# Patient Record
Sex: FEMALE | Race: WHITE | NOT HISPANIC OR LATINO | Employment: OTHER | URBAN - METROPOLITAN AREA
[De-identification: names, ages, dates, MRNs, and addresses within clinical notes are randomized per-mention and may not be internally consistent; named-entity substitution may affect disease eponyms.]

---

## 2021-11-18 ENCOUNTER — APPOINTMENT (OUTPATIENT)
Dept: CT IMAGING | Facility: HOSPITAL | Age: 76
End: 2021-11-18

## 2021-11-18 ENCOUNTER — HOSPITAL ENCOUNTER (OUTPATIENT)
Facility: HOSPITAL | Age: 76
Setting detail: OBSERVATION
Discharge: HOME | End: 2021-11-19
Attending: EMERGENCY MEDICINE | Admitting: NURSE PRACTITIONER

## 2021-11-18 DIAGNOSIS — N39.0 ACUTE UTI: ICD-10-CM

## 2021-11-18 DIAGNOSIS — Z78.9 FAILURE OF OUTPATIENT TREATMENT: ICD-10-CM

## 2021-11-18 DIAGNOSIS — D64.9 ANEMIA, UNSPECIFIED TYPE: ICD-10-CM

## 2021-11-18 DIAGNOSIS — N28.89 RENAL MASS: Primary | ICD-10-CM

## 2021-11-18 PROBLEM — N13.4 HYDROURETER, LEFT: Status: ACTIVE | Noted: 2021-11-18

## 2021-11-18 PROBLEM — I87.1 NUTCRACKER PHENOMENON OF RENAL VEIN: Status: ACTIVE | Noted: 2021-11-18

## 2021-11-18 PROBLEM — L40.9 PSORIASIS: Status: ACTIVE | Noted: 2021-11-18

## 2021-11-18 PROBLEM — N13.30 HYDRONEPHROSIS, LEFT: Status: ACTIVE | Noted: 2021-11-18

## 2021-11-18 PROBLEM — R19.00 RETROPERITONEAL MASS: Status: ACTIVE | Noted: 2021-11-18

## 2021-11-18 PROBLEM — E63.9 INADEQUATE ORAL NUTRITIONAL INTAKE: Status: ACTIVE | Noted: 2021-11-18

## 2021-11-18 PROBLEM — E86.0 DEHYDRATION: Status: ACTIVE | Noted: 2021-11-18

## 2021-11-18 PROBLEM — R61 UNEXPLAINED NIGHT SWEATS: Status: ACTIVE | Noted: 2021-11-18

## 2021-11-18 PROBLEM — E88.09 SERUM ALBUMIN DECREASED: Status: ACTIVE | Noted: 2021-11-18

## 2021-11-18 LAB
ALBUMIN SERPL-MCNC: 3.2 G/DL (ref 3.5–5.2)
ALBUMIN/GLOB SERPL: 1 G/DL
ALP SERPL-CCNC: 76 U/L (ref 39–117)
ALT SERPL W P-5'-P-CCNC: 8 U/L (ref 1–33)
ANION GAP SERPL CALCULATED.3IONS-SCNC: 11 MMOL/L (ref 5–15)
AST SERPL-CCNC: 28 U/L (ref 1–32)
B PARAPERT DNA SPEC QL NAA+PROBE: NOT DETECTED
B PERT DNA SPEC QL NAA+PROBE: NOT DETECTED
BACTERIA UR QL AUTO: ABNORMAL /HPF
BASOPHILS # BLD AUTO: 0.02 10*3/MM3 (ref 0–0.2)
BASOPHILS NFR BLD AUTO: 0.5 % (ref 0–1.5)
BILIRUB SERPL-MCNC: 0.8 MG/DL (ref 0–1.2)
BILIRUB UR QL STRIP: NEGATIVE
BUN SERPL-MCNC: 14 MG/DL (ref 8–23)
BUN/CREAT SERPL: 17.1 (ref 7–25)
C PNEUM DNA NPH QL NAA+NON-PROBE: NOT DETECTED
CALCIUM SPEC-SCNC: 8.9 MG/DL (ref 8.6–10.5)
CHLORIDE SERPL-SCNC: 100 MMOL/L (ref 98–107)
CLARITY UR: ABNORMAL
CO2 SERPL-SCNC: 26 MMOL/L (ref 22–29)
COARSE GRAN CASTS URNS QL MICRO: ABNORMAL /LPF
COLOR UR: YELLOW
CREAT SERPL-MCNC: 0.82 MG/DL (ref 0.57–1)
D-LACTATE SERPL-SCNC: 1.3 MMOL/L (ref 0.5–2)
DEPRECATED RDW RBC AUTO: 50.6 FL (ref 37–54)
EOSINOPHIL # BLD AUTO: 0.02 10*3/MM3 (ref 0–0.4)
EOSINOPHIL NFR BLD AUTO: 0.5 % (ref 0.3–6.2)
ERYTHROCYTE [DISTWIDTH] IN BLOOD BY AUTOMATED COUNT: 14.7 % (ref 12.3–15.4)
FLUAV SUBTYP SPEC NAA+PROBE: NOT DETECTED
FLUBV RNA ISLT QL NAA+PROBE: NOT DETECTED
GFR SERPL CREATININE-BSD FRML MDRD: 67 ML/MIN/1.73
GLOBULIN UR ELPH-MCNC: 3.1 GM/DL
GLUCOSE SERPL-MCNC: 105 MG/DL (ref 65–99)
GLUCOSE UR STRIP-MCNC: NEGATIVE MG/DL
HADV DNA SPEC NAA+PROBE: NOT DETECTED
HCOV 229E RNA SPEC QL NAA+PROBE: NOT DETECTED
HCOV HKU1 RNA SPEC QL NAA+PROBE: NOT DETECTED
HCOV NL63 RNA SPEC QL NAA+PROBE: NOT DETECTED
HCOV OC43 RNA SPEC QL NAA+PROBE: NOT DETECTED
HCT VFR BLD AUTO: 29.7 % (ref 34–46.6)
HGB BLD-MCNC: 9.5 G/DL (ref 12–15.9)
HGB UR QL STRIP.AUTO: ABNORMAL
HMPV RNA NPH QL NAA+NON-PROBE: NOT DETECTED
HPIV1 RNA ISLT QL NAA+PROBE: NOT DETECTED
HPIV2 RNA SPEC QL NAA+PROBE: NOT DETECTED
HPIV3 RNA NPH QL NAA+PROBE: NOT DETECTED
HPIV4 P GENE NPH QL NAA+PROBE: NOT DETECTED
HYALINE CASTS UR QL AUTO: ABNORMAL /LPF
IMM GRANULOCYTES # BLD AUTO: 0.01 10*3/MM3 (ref 0–0.05)
IMM GRANULOCYTES NFR BLD AUTO: 0.2 % (ref 0–0.5)
KETONES UR QL STRIP: ABNORMAL
LEUKOCYTE ESTERASE UR QL STRIP.AUTO: NEGATIVE
LIPASE SERPL-CCNC: 26 U/L (ref 13–60)
LYMPHOCYTES # BLD AUTO: 0.65 10*3/MM3 (ref 0.7–3.1)
LYMPHOCYTES NFR BLD AUTO: 15.5 % (ref 19.6–45.3)
M PNEUMO IGG SER IA-ACNC: NOT DETECTED
MCH RBC QN AUTO: 29.7 PG (ref 26.6–33)
MCHC RBC AUTO-ENTMCNC: 32 G/DL (ref 31.5–35.7)
MCV RBC AUTO: 92.8 FL (ref 79–97)
MONOCYTES # BLD AUTO: 0.53 10*3/MM3 (ref 0.1–0.9)
MONOCYTES NFR BLD AUTO: 12.6 % (ref 5–12)
MUCOUS THREADS URNS QL MICRO: ABNORMAL /HPF
NEUTROPHILS NFR BLD AUTO: 2.96 10*3/MM3 (ref 1.7–7)
NEUTROPHILS NFR BLD AUTO: 70.7 % (ref 42.7–76)
NITRITE UR QL STRIP: NEGATIVE
NRBC BLD AUTO-RTO: 0 /100 WBC (ref 0–0.2)
NT-PROBNP SERPL-MCNC: 573.5 PG/ML (ref 0–1800)
PH UR STRIP.AUTO: 5.5 [PH] (ref 5–8)
PHOSPHATE SERPL-MCNC: 2.9 MG/DL (ref 2.5–4.5)
PLAT MORPH BLD: NORMAL
PLATELET # BLD AUTO: 202 10*3/MM3 (ref 140–450)
PMV BLD AUTO: 11.5 FL (ref 6–12)
POTASSIUM SERPL-SCNC: 3.8 MMOL/L (ref 3.5–5.2)
PROT SERPL-MCNC: 6.3 G/DL (ref 6–8.5)
PROT UR QL STRIP: ABNORMAL
RBC # BLD AUTO: 3.2 10*6/MM3 (ref 3.77–5.28)
RBC # UR STRIP: ABNORMAL /HPF
RBC MORPH BLD: NORMAL
REF LAB TEST METHOD: ABNORMAL
RHINOVIRUS RNA SPEC NAA+PROBE: NOT DETECTED
RSV RNA NPH QL NAA+NON-PROBE: NOT DETECTED
SARS-COV-2 RNA NPH QL NAA+NON-PROBE: NOT DETECTED
SODIUM SERPL-SCNC: 137 MMOL/L (ref 136–145)
SP GR UR STRIP: 1.02 (ref 1–1.03)
SQUAMOUS #/AREA URNS HPF: ABNORMAL /HPF
TROPONIN T SERPL-MCNC: <0.01 NG/ML (ref 0–0.03)
UROBILINOGEN UR QL STRIP: ABNORMAL
WBC # UR STRIP: ABNORMAL /HPF
WBC MORPH BLD: NORMAL
WBC NRBC COR # BLD: 4.19 10*3/MM3 (ref 3.4–10.8)

## 2021-11-18 PROCEDURE — G0378 HOSPITAL OBSERVATION PER HR: HCPCS

## 2021-11-18 PROCEDURE — 87086 URINE CULTURE/COLONY COUNT: CPT | Performed by: NURSE PRACTITIONER

## 2021-11-18 PROCEDURE — 96361 HYDRATE IV INFUSION ADD-ON: CPT

## 2021-11-18 PROCEDURE — 96365 THER/PROPH/DIAG IV INF INIT: CPT

## 2021-11-18 PROCEDURE — 83690 ASSAY OF LIPASE: CPT | Performed by: NURSE PRACTITIONER

## 2021-11-18 PROCEDURE — 80053 COMPREHEN METABOLIC PANEL: CPT | Performed by: NURSE PRACTITIONER

## 2021-11-18 PROCEDURE — 99220 PR INITIAL OBSERVATION CARE/DAY 70 MINUTES: CPT | Performed by: HOSPITALIST

## 2021-11-18 PROCEDURE — 84484 ASSAY OF TROPONIN QUANT: CPT | Performed by: NURSE PRACTITIONER

## 2021-11-18 PROCEDURE — 85007 BL SMEAR W/DIFF WBC COUNT: CPT | Performed by: NURSE PRACTITIONER

## 2021-11-18 PROCEDURE — 74176 CT ABD & PELVIS W/O CONTRAST: CPT

## 2021-11-18 PROCEDURE — 93005 ELECTROCARDIOGRAM TRACING: CPT | Performed by: NURSE PRACTITIONER

## 2021-11-18 PROCEDURE — 81001 URINALYSIS AUTO W/SCOPE: CPT | Performed by: NURSE PRACTITIONER

## 2021-11-18 PROCEDURE — 25010000002 CEFTRIAXONE PER 250 MG: Performed by: NURSE PRACTITIONER

## 2021-11-18 PROCEDURE — 99284 EMERGENCY DEPT VISIT MOD MDM: CPT

## 2021-11-18 PROCEDURE — 74177 CT ABD & PELVIS W/CONTRAST: CPT

## 2021-11-18 PROCEDURE — 83880 ASSAY OF NATRIURETIC PEPTIDE: CPT | Performed by: NURSE PRACTITIONER

## 2021-11-18 PROCEDURE — 0202U NFCT DS 22 TRGT SARS-COV-2: CPT | Performed by: NURSE PRACTITIONER

## 2021-11-18 PROCEDURE — 70450 CT HEAD/BRAIN W/O DYE: CPT

## 2021-11-18 PROCEDURE — 83605 ASSAY OF LACTIC ACID: CPT | Performed by: NURSE PRACTITIONER

## 2021-11-18 PROCEDURE — 71250 CT THORAX DX C-: CPT

## 2021-11-18 PROCEDURE — 85025 COMPLETE CBC W/AUTO DIFF WBC: CPT | Performed by: NURSE PRACTITIONER

## 2021-11-18 PROCEDURE — 84100 ASSAY OF PHOSPHORUS: CPT | Performed by: HOSPITALIST

## 2021-11-18 PROCEDURE — 25010000002 IOPAMIDOL 61 % SOLUTION: Performed by: HOSPITALIST

## 2021-11-18 RX ORDER — SODIUM CHLORIDE 0.9 % (FLUSH) 0.9 %
10 SYRINGE (ML) INJECTION AS NEEDED
Status: DISCONTINUED | OUTPATIENT
Start: 2021-11-18 | End: 2021-11-19 | Stop reason: HOSPADM

## 2021-11-18 RX ORDER — SODIUM CHLORIDE 0.9 % (FLUSH) 0.9 %
10 SYRINGE (ML) INJECTION EVERY 12 HOURS SCHEDULED
Status: DISCONTINUED | OUTPATIENT
Start: 2021-11-18 | End: 2021-11-19 | Stop reason: HOSPADM

## 2021-11-18 RX ORDER — METOPROLOL SUCCINATE 25 MG/1
1 TABLET, EXTENDED RELEASE ORAL DAILY
COMMUNITY
Start: 2021-07-26

## 2021-11-18 RX ORDER — ASPIRIN 81 MG/1
TABLET, CHEWABLE ORAL
COMMUNITY

## 2021-11-18 RX ORDER — MIRTAZAPINE 15 MG/1
15 TABLET, FILM COATED ORAL NIGHTLY
Status: DISCONTINUED | OUTPATIENT
Start: 2021-11-18 | End: 2021-11-19 | Stop reason: HOSPADM

## 2021-11-18 RX ORDER — ASPIRIN 81 MG/1
81 TABLET ORAL DAILY
Status: DISCONTINUED | OUTPATIENT
Start: 2021-11-19 | End: 2021-11-19 | Stop reason: HOSPADM

## 2021-11-18 RX ORDER — ROSUVASTATIN CALCIUM 20 MG/1
20 TABLET, COATED ORAL
COMMUNITY
Start: 2021-07-28

## 2021-11-18 RX ORDER — HEPARIN SODIUM 5000 [USP'U]/ML
5000 INJECTION, SOLUTION INTRAVENOUS; SUBCUTANEOUS EVERY 8 HOURS SCHEDULED
Status: DISCONTINUED | OUTPATIENT
Start: 2021-11-19 | End: 2021-11-19 | Stop reason: HOSPADM

## 2021-11-18 RX ORDER — DEXTROSE AND SODIUM CHLORIDE 5; .9 G/100ML; G/100ML
125 INJECTION, SOLUTION INTRAVENOUS CONTINUOUS
Status: ACTIVE | OUTPATIENT
Start: 2021-11-18 | End: 2021-11-19

## 2021-11-18 RX ORDER — ROSUVASTATIN CALCIUM 20 MG/1
20 TABLET, COATED ORAL NIGHTLY
Status: DISCONTINUED | OUTPATIENT
Start: 2021-11-19 | End: 2021-11-19 | Stop reason: HOSPADM

## 2021-11-18 RX ORDER — ACETAMINOPHEN 500 MG
500 TABLET ORAL EVERY 6 HOURS PRN
Status: DISCONTINUED | OUTPATIENT
Start: 2021-11-18 | End: 2021-11-19 | Stop reason: HOSPADM

## 2021-11-18 RX ADMIN — MIRTAZAPINE 15 MG: 15 TABLET, FILM COATED ORAL at 20:59

## 2021-11-18 RX ADMIN — SODIUM CHLORIDE, PRESERVATIVE FREE 10 ML: 5 INJECTION INTRAVENOUS at 21:00

## 2021-11-18 RX ADMIN — IOPAMIDOL 85 ML: 612 INJECTION, SOLUTION INTRAVENOUS at 14:20

## 2021-11-18 RX ADMIN — DEXTROSE AND SODIUM CHLORIDE 125 ML/HR: 5; 900 INJECTION, SOLUTION INTRAVENOUS at 20:59

## 2021-11-18 RX ADMIN — SODIUM CHLORIDE 2 G: 900 INJECTION INTRAVENOUS at 12:58

## 2021-11-18 NOTE — H&P
[facility] Medicine Services  HISTORY AND PHYSICAL    Patient Name: Vale Cee  : 1945  MRN: [id number]  Primary Care Physician: Sarah Redman APRN  Date of admission: 2021      Subjective   Subjective     Chief Complaint:   Failure to Thrive    HPI:  Vale Cee is a 76 y.o. female with history of skin cancer, hypertension, kidney stones, hyperlipidemia, vertebral fracture, tobacco use disorder who has been failing to thrive for 3 months.  She reports going to her PCP now every couple of weeks and medication adjustments are made but she continues to get weaker and has no appetite and is progressively loosing weight.  She reports only eating roughly 200 calories per day and has had low blood pressure and having to be taken off a fluid pill recently due to low blood pressure.    She was put on potassium, treated for a UTI for 7 days, and most recently treated with Vitamin D but reports none of these treatments have improved her condition.  She is frustrated with her decline and presented to the ER due to getting worse.    On evaluation in the ER she was hypotensive on Physiologic Monitor in ER.  She recently has been hypotensive in the office and had to be taken off her baseline blood pressure mediation.  She is dehydrated on assessment in the ER.    She has had nausea but no vomiting and just can't eat.  She has been having hot flashes at night where she reports her hair gets wet but it is unclear if she has had a fever at home.    She was recently diagnosed with new anemia in the office but after outpatient work up if was felt she needed to see a hematologist.   She has yet to see one.    She reports no appetite, losing weight, night sweats, new anemia, and nausea.   She reports being vaccinated against COVID-19 and denies any recent exposures.  Her son is in the ER and provides some history.  She has been drinking Power [employer] since she has been dehydrated.      COVID  Details:    Symptoms:    [x] NONE [] Fever []  Cough [] Shortness of breath [] Change in taste/smell      The patient qualifies to receive the vaccine, but they have not yet received it.      Review of Systems     Gen- No fevers, chills  CV- No chest pain, palpitations  Resp- No cough, dyspnea  GI-  + nausea , abd pain      All other systems reviewed and are negative.     Personal History     Past Medical History  - tobacco use  - hypertension  - kidney stones  - appendicitis  - lumbar spine compression fracture   - Psoriasis  - skin cancer  - hyperlipidemia    Past Surgical History  - Appendectomy  - kidney stone work  - Gallbladder surgery  - Low Back surgery with cement (suspected Kyphoplasty)    Family History:   Father - emphysema (smoker)  Mother - pancreatic cancer  Otherwise pertinent FHx was reviewed and unremarkable.     Social History:  reports that she has quit smoking. She does not have any smokeless tobacco history on file. She reports that she does not drink alcohol and does not use drugs.  Social History     Social History Narrative   • Not on file     3 kids  GED level of education  Worked at [employer] in the past  Smoker of cigarettes - starting at 15 yrs old (smoker for over 60 yrs)  Denies alcohol abuse  Denies drug abuse    Medications:  Available home medication information reviewed.  (Not in a hospital admission)      Allergies   Allergen Reactions   • Codeine Nausea And Vomiting   • Penicillins Swelling       Objective   Objective     Vital Signs:   Temp:  [98 °F (36.7 °C)] 98 °F (36.7 °C)  Heart Rate:  [67-78] 67  Resp:  [16-17] 16  BP: (106-124)/(51-67) 110/58       Physical Exam     Constitutional: Awake, alert  Eyes: PERRLA, sclerae anicteric, no conjunctival injection  HENT: NCAT, dry tongue, wasted muscles of the face, neck, temples  Neck: Supple, no JVD, trachea midline  Respiratory: Clear to auscultation bilaterally, nonlabored respirations   Cardiovascular: RRR, no murmurs,  rubs, or gallops, palpable pedal pulses bilaterally  Gastrointestinal: Positive bowel sounds, soft, nontender, nondistended  Musculoskeletal: No edema  Psychiatric: Appropriate affect, cooperative  Neurologic: Oriented x 3, strength symmetric - generalized weakness, speech clear  Skin: dry, + skin tenting    Result Review:  I have personally reviewed the results from the time of this admission to 11/18/2021 17:32 EST and agree with these findings:  [x]  Laboratory  []  Microbiology  []  Radiology  []  EKG/Telemetry   []  Cardiology/Vascular   []  Pathology  []  Old records  []  Other:  Most notable findings include:   Wasted temporalis muscles, wasted appearance, dehydration, low blood pressure in the ER      LAB RESULTS:      Lab 11/18/21  1258 11/18/21  0929   WBC  --  4.19   HEMOGLOBIN  --  9.5*   HEMATOCRIT  --  29.7*   PLATELETS  --  202   NEUTROS ABS  --  2.96   IMMATURE GRANS (ABS)  --  0.01   LYMPHS ABS  --  0.65*   MONOS ABS  --  0.53   EOS ABS  --  0.02   MCV  --  92.8   LACTATE 1.3  --          Lab 11/18/21  0929   SODIUM 137   POTASSIUM 3.8   CHLORIDE 100   CO2 26.0   ANION GAP 11.0   BUN 14   CREATININE 0.82   GLUCOSE 105*   CALCIUM 8.9         Lab 11/18/21  0929   TOTAL PROTEIN 6.3   ALBUMIN 3.20*   GLOBULIN 3.1   ALT (SGPT) 8   AST (SGOT) 28   BILIRUBIN 0.8   ALK PHOS 76   LIPASE 26         Lab 11/18/21  0958 11/18/21  0929   PROBNP  --  573.5   TROPONIN T <0.010  --                  UA    Urinalysis 11/18/21 11/18/21    0924 0924   Squamous Epithelial Cells, UA  13-20 (A)   Specific Gravity, UA 1.018    Ketones, UA Trace (A)    Blood, UA Small (1+) (A)    Leukocytes, UA Negative    Nitrite, UA Negative    RBC, UA  3-6 (A)   WBC, UA  6-12 (A)   Bacteria, UA  3+ (A)   (A) Abnormal value              Microbiology Results (last 10 days)     Procedure Component Value - Date/Time    COVID PRE-OP / PRE-PROCEDURE SCREENING ORDER (NO ISOLATION) - Swab, Nasopharynx [[id number]]  (Normal) Collected: 11/18/21  0924    Lab Status: Final result Specimen: Swab from Nasopharynx Updated: 11/18/21 1044    Narrative:      The following orders were created for panel order COVID PRE-OP / PRE-PROCEDURE SCREENING ORDER (NO ISOLATION) - Swab, Nasopharynx.  Procedure                               Abnormality         Status                     ---------                               -----------         ------                     Respiratory Panel PCR w/...[[id number]]  Normal              Final result                 Please view results for these tests on the individual orders.    Respiratory Panel PCR w/COVID-19(SARS-CoV-2) DANIELLE/SHELL/MARTA/PAD/COR/MAD/KIM In-House, NP Swab in UTM/VTM, 3-4 HR TAT - Swab, Nasopharynx [[id number]]  (Normal) Collected: 11/18/21 0924    Lab Status: Final result Specimen: Swab from Nasopharynx Updated: 11/18/21 1044     ADENOVIRUS, PCR Not Detected     Coronavirus 229E Not Detected     Coronavirus HKU1 Not Detected     Coronavirus NL63 Not Detected     Coronavirus OC43 Not Detected     COVID19 Not Detected     Human Metapneumovirus Not Detected     Human Rhinovirus/Enterovirus Not Detected     Influenza A PCR Not Detected     Influenza B PCR Not Detected     Parainfluenza Virus 1 Not Detected     Parainfluenza Virus 2 Not Detected     Parainfluenza Virus 3 Not Detected     Parainfluenza Virus 4 Not Detected     RSV, PCR Not Detected     Bordetella pertussis pcr Not Detected     Bordetella parapertussis PCR Not Detected     Chlamydophila pneumoniae PCR Not Detected     Mycoplasma pneumo by PCR Not Detected    Narrative:      In the setting of a positive respiratory panel with a viral infection PLUS a negative procalcitonin without other underlying concern for bacterial infection, consider observing off antibiotics or discontinuation of antibiotics and continue supportive care. If the respiratory panel is positive for atypical bacterial infection (Bordetella pertussis, Chlamydophila pneumoniae, or Mycoplasma  pneumoniae), consider antibiotic de-escalation to target atypical bacterial infection.          CT Abdomen Pelvis Without Contrast    Result Date: 11/18/2021  EXAMINATION: CT CHEST WO CONTRAST DIAGNOSTIC-, CT ABDOMEN PELVIS WO CONTRAST-  INDICATION: general weakness.  Cough.  TECHNIQUE: CT of the chest abdomen and pelvis without contrast.  COMPARISON: NONE  FINDINGS:  Heterogeneous attenuation of the thyroid gland with likely small nodules. No bulky mediastinal or hilar adenopathy. Normal size of the heart without evidence of pericardial effusion. Coronary artery calcifications are seen. Mild atherosclerosis of the thoracic aorta which appears normal in caliber. The thoracic esophagus and remaining mediastinal structures are unremarkable. The trachea and bronchi are patent. There is mild-to-moderate centrilobular upper lobe predominant emphysematous change throughout the lungs. Mild streaky atelectasis or scarring at the lung bases. No focal consolidation. No pulmonary mass or suspicious pulmonary nodule. No pleural effusion or pneumothorax.  There is large bulky retroperitoneal and mesenteric lymphadenopathy with large retroperitoneal mich conglomerate measuring up to 6.9 x 10.5 cm in transverse dimension and 14.0 cm in craniocaudal length. There is associated moderate hydronephrosis of the left kidney (series 301 image 54). The right kidney is unremarkable.  The liver is unremarkable. The gallbladder is not well seen, likely decompressed. Normal appearance of the spleen without splenomegaly. Unremarkable appearance of the pancreas. The adrenal glands appear normal. No bowel obstruction. There is colonic diverticulosis without diverticulitis. No free intra-abdominal fluid or pneumoperitoneum. Unremarkable appearance of the uterus. The ovaries are not well-visualized. No acute osseous findings. Chronic appearing moderate superior endplate compression deformity of L3 with cement augmentation.      Impression:  There  is a large lobular retroperitoneal soft tissue mass measuring 6.9 x 10.5 x 14.0 cm, suboptimally assessed on this noncontrast exam. This abuts the inferior pole the left kidney. Overall this finding is concerning for large lymphadenopathy/mich conglomerate. This results in moderate left-sided hydroureteronephrosis. Contrast-enhanced CT may help further characterize and delineate this mass.  No acute thoracic findings. Mild-to-moderate centrilobular emphysema.   This report was finalized on 11/18/2021 11:57 AM by Satnam Valero MD.      CT Head Without Contrast    Result Date: 11/18/2021  EXAMINATION: CT HEAD WO CONTRAST-  INDICATION: Generalized weakness.  Previous CVA.  TECHNIQUE: Noncontrast head CT  COMPARISON: NONE  FINDINGS:  No acute intracranial hemorrhage. No acute large territory infarct. Minimal subcortical and periventricular white matter hypodensities, nonspecific finding which can be seen in setting of chronic small vessel ischemic change. No extra-axial collections. Normal size and configuration of the ventricles. No midline shift or herniation. Unremarkable appearance of the orbits. No acute osseous findings. The mastoid air cells and paranasal sinuses are clear.      Impression:  No acute intracranial findings.   This report was finalized on 11/18/2021 11:41 AM by Satnam Valero MD.      CT Chest Without Contrast Diagnostic    Result Date: 11/18/2021  EXAMINATION: CT CHEST WO CONTRAST DIAGNOSTIC-, CT ABDOMEN PELVIS WO CONTRAST-  INDICATION: general weakness.  Cough.  TECHNIQUE: CT of the chest abdomen and pelvis without contrast.  COMPARISON: NONE  FINDINGS:  Heterogeneous attenuation of the thyroid gland with likely small nodules. No bulky mediastinal or hilar adenopathy. Normal size of the heart without evidence of pericardial effusion. Coronary artery calcifications are seen. Mild atherosclerosis of the thoracic aorta which appears normal in caliber. The thoracic esophagus and remaining  mediastinal structures are unremarkable. The trachea and bronchi are patent. There is mild-to-moderate centrilobular upper lobe predominant emphysematous change throughout the lungs. Mild streaky atelectasis or scarring at the lung bases. No focal consolidation. No pulmonary mass or suspicious pulmonary nodule. No pleural effusion or pneumothorax.  There is large bulky retroperitoneal and mesenteric lymphadenopathy with large retroperitoneal mich conglomerate measuring up to 6.9 x 10.5 cm in transverse dimension and 14.0 cm in craniocaudal length. There is associated moderate hydronephrosis of the left kidney (series 301 image 54). The right kidney is unremarkable.  The liver is unremarkable. The gallbladder is not well seen, likely decompressed. Normal appearance of the spleen without splenomegaly. Unremarkable appearance of the pancreas. The adrenal glands appear normal. No bowel obstruction. There is colonic diverticulosis without diverticulitis. No free intra-abdominal fluid or pneumoperitoneum. Unremarkable appearance of the uterus. The ovaries are not well-visualized. No acute osseous findings. Chronic appearing moderate superior endplate compression deformity of L3 with cement augmentation.      Impression:  There is a large lobular retroperitoneal soft tissue mass measuring 6.9 x 10.5 x 14.0 cm, suboptimally assessed on this noncontrast exam. This abuts the inferior pole the left kidney. Overall this finding is concerning for large lymphadenopathy/mich conglomerate. This results in moderate left-sided hydroureteronephrosis. Contrast-enhanced CT may help further characterize and delineate this mass.  No acute thoracic findings. Mild-to-moderate centrilobular emphysema.   This report was finalized on 11/18/2021 11:57 AM by Satnam Valero MD.      CT Abdomen Pelvis With Contrast    Result Date: 11/18/2021  EXAMINATION: CT ABDOMEN PELVIS W CONTRAST-  INDICATION: left Renal Mass; N28.89-Other specified  disorders of kidney and ureter; N39.0-Urinary tract infection, site not specified; Z78.9-Other specified health status; D64.9-Anemia, unspecified  TECHNIQUE: CT the abdomen and pelvis with IV contrast  COMPARISON: Same-day noncontrast CT abdomen pelvis  FINDINGS:  Redemonstration of a large lobulated retroperitoneal soft tissue mass measuring 5.4 x 10.6 x 14.8 cm, suspicious for retroperitoneal lymphadenopathy/mich conglomerate. This extends to the lower pole the left kidney where there is abnormal hypodense attenuation of the lower pole cortex (series 900 image 50). There is resultant moderate left-sided hydroureter nephrosis from mass effect. There appears to be mild-to-moderate regions of narrowing of the left renal vein which remains patent. The left renal artery passes through the mass without significant narrowing. The atherosclerotic abdominal aorta appears patent without significant narrowing. There is mild delayed enhancement and excretion of the left kidney. There are a few mildly enlarged lymph nodes more inferiorly along the iliac chains.  The lung bases and lower thorax are unremarkable with the exception of mild right lung base atelectasis. The gallbladder is not visualized, possibly surgically absent or decompressed. Normal appearance of the spleen without splenomegaly. The adrenal glands are unremarkable. Colonic diverticulosis without diverticulitis. No bowel obstruction. Unremarkable appearance of the uterus. The ovaries are not well-visualized. No free intra-abdominal fluid or conspicuous mesenteric fat stranding. No pneumoperitoneum. No acute osseous findings. Chronic moderate superior endplate compression deformity with cement augmentation of L3.      Impression:  Redemonstration of a large lobulated retroperitoneal soft tissue mass measuring 5.4 x 10.6 x 14.8 cm, suspicious for lymphadenopathy/mich conglomerate, compatible with lymphomatous process. The left margin of this conglomerate  extends to the inferior pole the left kidney where there is contiguous abnormal soft tissue in attenuation into the renal cortex. It is unclear whether this represents lymphomatous extension/involvement into the kidney, or whether this reflects renal lymphoma with retroperitoneal extension. There is moderate left-sided hydronephrosis from mass effect, as well as mild-to-moderate regions of narrowing of the left renal vein. There is mild delayed enhancement and excretion of the left kidney.   This report was finalized on 11/18/2021 2:50 PM by Satnam Valero MD.            Assessment/Plan   Assessment & Plan     Active Hospital Problems    Diagnosis  POA   • Renal mass [N28.89]  Yes   • Serum albumin decreased [E88.09]  Yes   • Dehydration [E86.0]  Yes   • Unexplained night sweats [R61]  Yes   • Anemia [D64.9]  Yes   • Inadequate oral nutritional intake [E63.9]  Yes       Generalized Weakness / Fatigue  - failure to improve in the outpatient setting  - likely multifactorial issue  - clearly has not been eating enough calories to support herself  - roughly estimated to be eating 200 cals per day  - starvation ketosis  - dextrose fluids for now  Renal Mass / Retroperitoneal Mass  - unclear origin of process but since unilateral possibly eminating from Left Kidney  - ? IR guided biopsy  - Heme/Onc Consultation  - Urology Consultation  Hypertension  - has been struggling from low blood pressure recently  - dehydrated appearance  Left HydroUreter  Left HydroNephrosis  - Urology Evaluation  Probable Compression of Left Renal Vein  - narrowing of Left renal vein reported  - Urology evaluation  Dehydration  - IV fluids  Poor Oral Intake  - only taking 200 calories per day for last 3 months  - supplemental shakes  New Onset Anemia  - outpatient work up no root cause  - was planning for Hematology evauation  Tobacco Use Disorder  - smoker starting at age 15 (smoker for 61 yrs)  - encourage smoking cessation  - nicotine  patch  Malnutrition  - Nutrition evaluation  Possible UTI  - recently treated for UTI for 7 days  - will give a short course of IV abx but really not having symptoms  - urinalysis looks contraminated      DVT prophylaxis:  Heparin SC      CODE STATUS:  Full Code  There are no questions and answers to display.       Admission Status:  I believe this patient meets OBSERVATION status, however if further evaluation or treatment plans warrant, status may change.  Based upon current information, I predict patient's care encounter to be less than or equal to 2 midnights.      Monster Mcguire MD  11/18/21

## 2021-11-18 NOTE — CASE MANAGEMENT/SOCIAL WORK
Discharge Planning Assessment  [facility]     Patient Name: Vale Cee  MRN: [id number]  Today's Date: 11/18/2021    Admit Date: 11/18/2021     Discharge Needs Assessment     Row Name 11/18/21 1416       Living Environment    Lives With alone    Unique Family Situation Lives alone in [facility]    Current Living Arrangements home/apartment/condo    Primary Care Provided by self    Provides Primary Care For no one    Caregiving Concerns Reports she is independent with ADL's    Family Caregiver if Needed child(khadar), adult    Family Caregiver Names Son at bedside/supportive    Quality of Family Relationships helpful; involved; supportive    Able to Return to Prior Arrangements yes    Living Arrangement Comments Resides in private residence alone       Resource/Environmental Concerns    Resource/Environmental Concerns none    Transportation Concerns car, none       Transition Planning    Patient/Family Anticipates Transition to home    Patient/Family Anticipated Services at Transition     Transportation Anticipated family or friend will provide       Discharge Needs Assessment    Readmission Within the Last 30 Days no previous admission in last 30 days    Equipment Currently Used at Home none    Concerns to be Addressed no discharge needs identified    Concerns Comments No needs in ED, Case Management following    Anticipated Changes Related to Illness none    Discharge Coordination/Progress Anticipate patient will return home when medically stable               Discharge Plan     Row Name 11/18/21 1419       Plan    Plan Home    Patient/Family in Agreement with Plan yes  Patient/son    Plan Comments Planning hospital admission, met with patient and son at bedside.  Patient reports she is independent with all ADL's with no DME use or home health involvement.  Made aware Case Management will follow for all needs, appreciative.    Final Discharge Disposition Code 01 - home or self-care               Continued Care and Services - Admitted Since 11/18/2021    Coordination has not been started for this encounter.          Demographic Summary     Row Name 11/18/21 5547       General Information    Arrived From emergency department; home    Referral Source admission list; emergency department    Reason for Consult discharge planning    Preferred Language English     Used During This Interaction no               Functional Status    No documentation.                Psychosocial    No documentation.                Abuse/Neglect    No documentation.                Legal    No documentation.                Substance Abuse    No documentation.                Patient Forms    No documentation.                   CARMELA Duran

## 2021-11-18 NOTE — ED PROVIDER NOTES
Subjective   Patient presents to the ER with generalized weakness and fatigue for the last several weeks to months.  She tells me she has been following up with her primary care at [facility].  Patient denies any chest pain or difficulty breathing.  She denies any headache or abdominal pain.  She tells me her  recently  within the last year.      Weakness - Generalized  Severity:  Moderate  Onset quality:  Gradual  Duration: several months.  Timing:  Constant  Progression:  Waxing and waning  Chronicity:  New  Relieved by:  Nothing  Worsened by:  Activity  Associated symptoms: no abdominal pain, no chest pain, no cough, no diarrhea, no dysuria, no fever, no frequency, no nausea, no near-syncope, no seizures, no shortness of breath, no urgency and no vomiting        Review of Systems   Constitutional: Negative for chills, diaphoresis and fever.   HENT: Negative for congestion and sore throat.    Respiratory: Negative for cough, choking, chest tightness, shortness of breath and wheezing.    Cardiovascular: Negative for chest pain, leg swelling and near-syncope.   Gastrointestinal: Negative for abdominal distention, abdominal pain, anal bleeding, blood in stool, constipation, diarrhea, nausea and vomiting.   Genitourinary: Negative for difficulty urinating, dysuria, flank pain, frequency, hematuria and urgency.   Neurological: Negative for seizures.   All other systems reviewed and are negative.      History reviewed. No pertinent past medical history.    Allergies   Allergen Reactions   • Codeine Nausea And Vomiting   • Penicillins Swelling       History reviewed. No pertinent surgical history.    History reviewed. No pertinent family history.    Social History     Socioeconomic History   • Marital status:    Tobacco Use   • Smoking status: Former Smoker   Substance and Sexual Activity   • Alcohol use: Never   • Drug use: Never           Objective   Physical Exam  Constitutional:       Appearance:  She is well-developed.   HENT:      Head: Normocephalic and atraumatic.      Right Ear: External ear normal.      Left Ear: External ear normal.      Nose: Nose normal.   Eyes:      Conjunctiva/sclera: Conjunctivae normal.      Pupils: Pupils are equal, round, and reactive to light.   Cardiovascular:      Rate and Rhythm: Normal rate and regular rhythm.      Heart sounds: Normal heart sounds.   Pulmonary:      Effort: Pulmonary effort is normal.      Breath sounds: Normal breath sounds.   Abdominal:      General: Bowel sounds are normal.      Palpations: Abdomen is soft.   Musculoskeletal:         General: Normal range of motion.      Cervical back: Normal range of motion and neck supple.   Skin:     General: Skin is warm and dry.   Neurological:      Mental Status: She is alert and oriented to person, place, and time.   Psychiatric:         Behavior: Behavior normal.         Thought Content: Thought content normal.         Judgment: Judgment normal.         Procedures           ED Course  ED Course as of 11/18/21 1329   u Nov 18, 2021   1038 Waiting CTs [JM]   1240 Case discussed with Dr. Sánchez who recommends getting a renal mass protocol.  I spoke to CT and they advised me that is a CAT scan abdomen pelvis with and without contrast.  I have paged hospitalist for admission. [JM]   1578 Hospitalist paged [JM]   1325 Spoke with Dr. Wagner she will admit []      ED Course User Index  [JM] Louis Gupta APRN           CT Head Without Contrast   Final Result       No acute intracranial findings.           This report was finalized on 11/18/2021 11:41 AM by Satnam Valero MD.          CT Chest Without Contrast Diagnostic   Final Result       There is a large lobular retroperitoneal soft tissue mass measuring 6.9   x 10.5 x 14.0 cm, suboptimally assessed on this noncontrast exam. This   abuts the inferior pole the left kidney. Overall this finding is   concerning for large lymphadenopathy/mich conglomerate. This  results in   moderate left-sided hydroureteronephrosis. Contrast-enhanced CT may help   further characterize and delineate this mass.       No acute thoracic findings. Mild-to-moderate centrilobular emphysema.           This report was finalized on 11/18/2021 11:57 AM by Satnam Valero MD.          CT Abdomen Pelvis Without Contrast   Final Result       There is a large lobular retroperitoneal soft tissue mass measuring 6.9   x 10.5 x 14.0 cm, suboptimally assessed on this noncontrast exam. This   abuts the inferior pole the left kidney. Overall this finding is   concerning for large lymphadenopathy/mich conglomerate. This results in   moderate left-sided hydroureteronephrosis. Contrast-enhanced CT may help   further characterize and delineate this mass.       No acute thoracic findings. Mild-to-moderate centrilobular emphysema.           This report was finalized on 11/18/2021 11:57 AM by Satnam Valero MD.          CT Abdomen Pelvis With & Without Contrast    (Results Pending)                                     MDM    Final diagnoses:   Renal mass   Acute UTI   Failure of outpatient treatment   Anemia, unspecified type       ED Disposition  ED Disposition     ED Disposition Condition Comment    Decision to Admit            No follow-up provider specified.       Medication List      No changes were made to your prescriptions during this visit.          Louis Gupta, APRN  11/18/21 1243       Louis Gupta, MARIA  11/18/21 132

## 2021-11-19 ENCOUNTER — APPOINTMENT (OUTPATIENT)
Dept: CARDIOLOGY | Facility: HOSPITAL | Age: 76
End: 2021-11-19

## 2021-11-19 VITALS
SYSTOLIC BLOOD PRESSURE: 108 MMHG | HEART RATE: 84 BPM | WEIGHT: 140 LBS | RESPIRATION RATE: 18 BRPM | OXYGEN SATURATION: 93 % | BODY MASS INDEX: 23.32 KG/M2 | TEMPERATURE: 97.6 F | DIASTOLIC BLOOD PRESSURE: 58 MMHG | HEIGHT: 65 IN

## 2021-11-19 DIAGNOSIS — R93.5 ABNORMAL FINDINGS ON DIAGNOSTIC IMAGING OF OTHER ABDOMINAL REGIONS, INCLUDING RETROPERITONEUM: ICD-10-CM

## 2021-11-19 DIAGNOSIS — R19.00 RETROPERITONEAL MASS: Primary | ICD-10-CM

## 2021-11-19 PROBLEM — E55.9 VITAMIN D DEFICIENCY: Status: ACTIVE | Noted: 2021-11-19

## 2021-11-19 PROBLEM — E43 SEVERE MALNUTRITION: Status: ACTIVE | Noted: 2021-11-19

## 2021-11-19 PROBLEM — D68.9 COAGULOPATHY (HCC): Status: ACTIVE | Noted: 2021-11-19

## 2021-11-19 LAB
25(OH)D3 SERPL-MCNC: 13.8 NG/ML (ref 30–100)
ANION GAP SERPL CALCULATED.3IONS-SCNC: 9 MMOL/L (ref 5–15)
BACTERIA SPEC AEROBE CULT: NORMAL
BASOPHILS # BLD AUTO: 0.02 10*3/MM3 (ref 0–0.2)
BASOPHILS NFR BLD AUTO: 0.6 % (ref 0–1.5)
BH CV ECHO MEAS - BSA(HAYCOCK): 1.7 M^2
BH CV ECHO MEAS - BSA: 1.7 M^2
BH CV ECHO MEAS - BZI_BMI: 23.3 KILOGRAMS/M^2
BH CV ECHO MEAS - BZI_METRIC_HEIGHT: 165.1 CM
BH CV ECHO MEAS - BZI_METRIC_WEIGHT: 63.5 KG
BH CV ECHO MEAS - EDV(CUBED): 70 ML
BH CV ECHO MEAS - EDV(MOD-SP2): 55 ML
BH CV ECHO MEAS - EDV(MOD-SP4): 52 ML
BH CV ECHO MEAS - EDV(TEICH): 75.1 ML
BH CV ECHO MEAS - EF(CUBED): 49 %
BH CV ECHO MEAS - EF(MOD-BP): 65 %
BH CV ECHO MEAS - EF(MOD-SP2): 61.8 %
BH CV ECHO MEAS - EF(MOD-SP4): 67.3 %
BH CV ECHO MEAS - EF(TEICH): 41.6 %
BH CV ECHO MEAS - ESV(CUBED): 35.7 ML
BH CV ECHO MEAS - ESV(MOD-SP2): 21 ML
BH CV ECHO MEAS - ESV(MOD-SP4): 17 ML
BH CV ECHO MEAS - ESV(TEICH): 43.9 ML
BH CV ECHO MEAS - FS: 20.1 %
BH CV ECHO MEAS - IVS/LVPW: 1.1
BH CV ECHO MEAS - IVSD: 1.1 CM
BH CV ECHO MEAS - LV DIASTOLIC VOL/BSA (35-75): 30.6 ML/M^2
BH CV ECHO MEAS - LV MASS(C)D: 147.1 GRAMS
BH CV ECHO MEAS - LV MASS(C)DI: 86.5 GRAMS/M^2
BH CV ECHO MEAS - LV SYSTOLIC VOL/BSA (12-30): 10 ML/M^2
BH CV ECHO MEAS - LVIDD: 4.1 CM
BH CV ECHO MEAS - LVIDS: 3.3 CM
BH CV ECHO MEAS - LVLD AP2: 6.6 CM
BH CV ECHO MEAS - LVLD AP4: 6.1 CM
BH CV ECHO MEAS - LVLS AP2: 5.4 CM
BH CV ECHO MEAS - LVLS AP4: 4.9 CM
BH CV ECHO MEAS - LVPWD: 1 CM
BH CV ECHO MEAS - SI(CUBED): 20.2 ML/M^2
BH CV ECHO MEAS - SI(MOD-SP2): 20 ML/M^2
BH CV ECHO MEAS - SI(MOD-SP4): 20.6 ML/M^2
BH CV ECHO MEAS - SI(TEICH): 18.4 ML/M^2
BH CV ECHO MEAS - SV(CUBED): 34.3 ML
BH CV ECHO MEAS - SV(MOD-SP2): 34 ML
BH CV ECHO MEAS - SV(MOD-SP4): 35 ML
BH CV ECHO MEAS - SV(TEICH): 31.3 ML
BUN SERPL-MCNC: 10 MG/DL (ref 8–23)
BUN/CREAT SERPL: 12.8 (ref 7–25)
CALCIUM SPEC-SCNC: 8.4 MG/DL (ref 8.6–10.5)
CHLORIDE SERPL-SCNC: 105 MMOL/L (ref 98–107)
CO2 SERPL-SCNC: 25 MMOL/L (ref 22–29)
CREAT SERPL-MCNC: 0.78 MG/DL (ref 0.57–1)
DEPRECATED RDW RBC AUTO: 52.9 FL (ref 37–54)
EOSINOPHIL # BLD AUTO: 0.03 10*3/MM3 (ref 0–0.4)
EOSINOPHIL NFR BLD AUTO: 0.9 % (ref 0.3–6.2)
ERYTHROCYTE [DISTWIDTH] IN BLOOD BY AUTOMATED COUNT: 15 % (ref 12.3–15.4)
FERRITIN SERPL-MCNC: 794.6 NG/ML (ref 13–150)
GFR SERPL CREATININE-BSD FRML MDRD: 71 ML/MIN/1.73
GLUCOSE SERPL-MCNC: 119 MG/DL (ref 65–99)
HAPTOGLOB SERPL-MCNC: 265 MG/DL (ref 30–200)
HCT VFR BLD AUTO: 26.7 % (ref 34–46.6)
HGB BLD-MCNC: 8.5 G/DL (ref 12–15.9)
IMM GRANULOCYTES # BLD AUTO: 0.01 10*3/MM3 (ref 0–0.05)
IMM GRANULOCYTES NFR BLD AUTO: 0.3 % (ref 0–0.5)
INR PPP: 1.27 (ref 0.85–1.16)
IRON 24H UR-MRATE: 26 MCG/DL (ref 37–145)
IRON SATN MFR SERPL: 14 % (ref 20–50)
LDH SERPL-CCNC: 537 U/L (ref 135–214)
LV EF 2D ECHO EST: 65 %
LYMPHOCYTES # BLD AUTO: 0.74 10*3/MM3 (ref 0.7–3.1)
LYMPHOCYTES NFR BLD AUTO: 21.5 % (ref 19.6–45.3)
MAXIMAL PREDICTED HEART RATE: 142 BPM
MCH RBC QN AUTO: 30.4 PG (ref 26.6–33)
MCHC RBC AUTO-ENTMCNC: 31.8 G/DL (ref 31.5–35.7)
MCV RBC AUTO: 95.4 FL (ref 79–97)
MONOCYTES # BLD AUTO: 0.35 10*3/MM3 (ref 0.1–0.9)
MONOCYTES NFR BLD AUTO: 10.2 % (ref 5–12)
NEUTROPHILS NFR BLD AUTO: 2.29 10*3/MM3 (ref 1.7–7)
NEUTROPHILS NFR BLD AUTO: 66.5 % (ref 42.7–76)
NRBC BLD AUTO-RTO: 0 /100 WBC (ref 0–0.2)
PLATELET # BLD AUTO: 202 10*3/MM3 (ref 140–450)
PMV BLD AUTO: 11.9 FL (ref 6–12)
POTASSIUM SERPL-SCNC: 3.2 MMOL/L (ref 3.5–5.2)
PROTHROMBIN TIME: 15.5 SECONDS (ref 11.4–14.4)
RBC # BLD AUTO: 2.8 10*6/MM3 (ref 3.77–5.28)
RETICS # AUTO: 0.06 10*6/MM3 (ref 0.02–0.13)
RETICS/RBC NFR AUTO: 2.13 % (ref 0.7–1.9)
SODIUM SERPL-SCNC: 139 MMOL/L (ref 136–145)
STRESS TARGET HR: 121 BPM
TIBC SERPL-MCNC: 192 MCG/DL (ref 298–536)
TRANSFERRIN SERPL-MCNC: 129 MG/DL (ref 200–360)
URATE SERPL-MCNC: 4.3 MG/DL (ref 2.4–5.7)
WBC NRBC COR # BLD: 3.44 10*3/MM3 (ref 3.4–10.8)

## 2021-11-19 PROCEDURE — 83615 LACTATE (LD) (LDH) ENZYME: CPT | Performed by: HOSPITALIST

## 2021-11-19 PROCEDURE — 96366 THER/PROPH/DIAG IV INF ADDON: CPT

## 2021-11-19 PROCEDURE — 84466 ASSAY OF TRANSFERRIN: CPT | Performed by: HOSPITALIST

## 2021-11-19 PROCEDURE — 83010 ASSAY OF HAPTOGLOBIN QUANT: CPT | Performed by: HOSPITALIST

## 2021-11-19 PROCEDURE — 80048 BASIC METABOLIC PNL TOTAL CA: CPT | Performed by: HOSPITALIST

## 2021-11-19 PROCEDURE — 83540 ASSAY OF IRON: CPT | Performed by: HOSPITALIST

## 2021-11-19 PROCEDURE — G0378 HOSPITAL OBSERVATION PER HR: HCPCS

## 2021-11-19 PROCEDURE — 96361 HYDRATE IV INFUSION ADD-ON: CPT

## 2021-11-19 PROCEDURE — 82306 VITAMIN D 25 HYDROXY: CPT | Performed by: HOSPITALIST

## 2021-11-19 PROCEDURE — 99217 PR OBSERVATION CARE DISCHARGE MANAGEMENT: CPT | Performed by: NURSE PRACTITIONER

## 2021-11-19 PROCEDURE — 85610 PROTHROMBIN TIME: CPT | Performed by: HOSPITALIST

## 2021-11-19 PROCEDURE — 99205 OFFICE O/P NEW HI 60 MIN: CPT | Performed by: INTERNAL MEDICINE

## 2021-11-19 PROCEDURE — 25010000002 CEFTRIAXONE PER 250 MG: Performed by: HOSPITALIST

## 2021-11-19 PROCEDURE — 85045 AUTOMATED RETICULOCYTE COUNT: CPT | Performed by: HOSPITALIST

## 2021-11-19 PROCEDURE — 93308 TTE F-UP OR LMTD: CPT | Performed by: INTERNAL MEDICINE

## 2021-11-19 PROCEDURE — 84550 ASSAY OF BLOOD/URIC ACID: CPT | Performed by: INTERNAL MEDICINE

## 2021-11-19 PROCEDURE — 85025 COMPLETE CBC W/AUTO DIFF WBC: CPT | Performed by: HOSPITALIST

## 2021-11-19 PROCEDURE — 82728 ASSAY OF FERRITIN: CPT | Performed by: HOSPITALIST

## 2021-11-19 PROCEDURE — 93308 TTE F-UP OR LMTD: CPT

## 2021-11-19 RX ORDER — MELATONIN
1000 DAILY
Qty: 30 TABLET | Refills: 0 | Status: SHIPPED | OUTPATIENT
Start: 2021-11-19 | End: 2021-12-19

## 2021-11-19 RX ORDER — POTASSIUM CHLORIDE 1.5 G/1.77G
40 POWDER, FOR SOLUTION ORAL AS NEEDED
Status: DISCONTINUED | OUTPATIENT
Start: 2021-11-19 | End: 2021-11-19 | Stop reason: HOSPADM

## 2021-11-19 RX ORDER — MIRTAZAPINE 15 MG/1
15 TABLET, FILM COATED ORAL NIGHTLY
Qty: 30 TABLET | Refills: 1 | Status: SHIPPED | OUTPATIENT
Start: 2021-11-19 | End: 2022-01-17 | Stop reason: SDUPTHER

## 2021-11-19 RX ORDER — POTASSIUM CHLORIDE 750 MG/1
40 CAPSULE, EXTENDED RELEASE ORAL AS NEEDED
Status: DISCONTINUED | OUTPATIENT
Start: 2021-11-19 | End: 2021-11-19 | Stop reason: HOSPADM

## 2021-11-19 RX ORDER — ACETAMINOPHEN 500 MG
500 TABLET ORAL EVERY 6 HOURS PRN
Start: 2021-11-19

## 2021-11-19 RX ADMIN — POTASSIUM CHLORIDE 40 MEQ: 750 CAPSULE, EXTENDED RELEASE ORAL at 12:05

## 2021-11-19 RX ADMIN — DEXTROSE AND SODIUM CHLORIDE 125 ML/HR: 5; 900 INJECTION, SOLUTION INTRAVENOUS at 04:46

## 2021-11-19 RX ADMIN — SODIUM CHLORIDE 1 G: 900 INJECTION INTRAVENOUS at 12:05

## 2021-11-19 RX ADMIN — METOPROLOL TARTRATE 12.5 MG: 25 TABLET, FILM COATED ORAL at 08:25

## 2021-11-19 RX ADMIN — ASPIRIN 81 MG: 81 TABLET, COATED ORAL at 08:25

## 2021-11-19 NOTE — CONSULTS
Subjective     CHIEF COMPLAINT: Fatigue weakness and unexplained weight loss    HISTORY OF PRESENT ILLNESS:  The patient is a 76 y.o. female, referred by Monster Mcguire MD for lymphoma.  The patient was in her usual health until 6 months ago he started to lose weight.  This has been an intentional patient was approximately 25 pounds over the last 6 months.  This has been associate with fatigue.  The symptoms get worse with activity improves with rest.  She had blood work done that revealed anemia.  She presented to [facility] emergency room last night CT scan revealed large mass next the left kidney most consistent with bulky lymphadenopathy.  The patient was admitted hospitalist service and was consulted to further assist in her care.  When I saw the patient today she is laying comfortable in bed, her son is at the bedside.  She has never been diagnosed with cancer.    REVIEW OF SYSTEMS:  A 14 point review of systems was performed and is negative except as noted above.    History reviewed. No pertinent past medical history.    No current facility-administered medications on file prior to encounter.     Current Outpatient Medications on File Prior to Encounter   Medication Sig Dispense Refill   • metoprolol succinate XL (TOPROL-XL) 25 MG 24 hr tablet Take 1 tablet by mouth Daily.     • rosuvastatin (CRESTOR) 20 MG tablet Take 20 mg by mouth.     • Apremilast (OTEZLA PO) 30 mg.     • aspirin (Aspirin 81) 81 MG chewable tablet Aspir-81         Allergies   Allergen Reactions   • Codeine Nausea And Vomiting   • Penicillins Swelling       History reviewed. No pertinent surgical history.    OB History   No obstetric history on file.       Social History     Socioeconomic History   • Marital status:    Tobacco Use   • Smoking status: Former Smoker   Substance and Sexual Activity   • Alcohol use: Never   • Drug use: Never       History reviewed. No pertinent family history.    Objective     Vitals:     11/19/21 0446 11/19/21 0500 11/19/21 0710 11/19/21 0825   BP: 126/62  129/61    BP Location: Right arm  Right arm    Patient Position: Lying  Lying    Pulse: 78 71 77 76   Resp: 16  18    Temp: 100 °F (37.8 °C)  97.7 °F (36.5 °C)    TempSrc: Oral  Oral    SpO2: 91% 91% 92%    Weight:       Height:                            ECOG Performance Status: 1 - Symptomatic but completely ambulatory  General: well appearing female in no acute distress  Neuro/Psych: A&O x 3, gait steady, appropriate affect, strength 5/5 in all muscle groups  HEENT: sclerae anicteric, oropharynx clear  Lymphatics: no cervical, supraclavicular, or axillary adenopathy  Cardiovascular: regular rate and rhythm, no murmurs  Lungs: clear to auscultation bilaterally  Abdomen: soft, nontender, nondistended.  No palpable organomegaly  Extremities: no lower extremity edema  Skin: no rashes, lesions, bruising, or petechiae      Admission on 11/18/2021   Component Date Value Ref Range Status   • Glucose 11/18/2021 105* 65 - 99 mg/dL Final   • BUN 11/18/2021 14  8 - 23 mg/dL Final   • Creatinine 11/18/2021 0.82  0.57 - 1.00 mg/dL Final   • Sodium 11/18/2021 137  136 - 145 mmol/L Final   • Potassium 11/18/2021 3.8  3.5 - 5.2 mmol/L Final    Slight hemolysis detected by analyzer. Results may be affected.   • Chloride 11/18/2021 100  98 - 107 mmol/L Final   • CO2 11/18/2021 26.0  22.0 - 29.0 mmol/L Final   • Calcium 11/18/2021 8.9  8.6 - 10.5 mg/dL Final   • Total Protein 11/18/2021 6.3  6.0 - 8.5 g/dL Final   • Albumin 11/18/2021 3.20* 3.50 - 5.20 g/dL Final   • ALT (SGPT) 11/18/2021 8  1 - 33 U/L Final   • AST (SGOT) 11/18/2021 28  1 - 32 U/L Final   • Alkaline Phosphatase 11/18/2021 76  39 - 117 U/L Final   • Total Bilirubin 11/18/2021 0.8  0.0 - 1.2 mg/dL Final   • eGFR Non  Amer 11/18/2021 67  >60 mL/min/1.73 Final   • Globulin 11/18/2021 3.1  gm/dL Final    Calculated Result   • A/G Ratio 11/18/2021 1.0  g/dL Final   • BUN/Creatinine Ratio  11/18/2021 17.1  7.0 - 25.0 Final   • Anion Gap 11/18/2021 11.0  5.0 - 15.0 mmol/L Final   • Color, UA 11/18/2021 Yellow  Yellow, Straw Final   • Appearance, UA 11/18/2021 Cloudy* Clear Final   • pH, UA 11/18/2021 5.5  5.0 - 8.0 Final   • Specific Gravity, UA 11/18/2021 1.018  1.001 - 1.030 Final   • Glucose, UA 11/18/2021 Negative  Negative Final   • Ketones, UA 11/18/2021 Trace* Negative Final   • Bilirubin, UA 11/18/2021 Negative  Negative Final   • Blood, UA 11/18/2021 Small (1+)* Negative Final   • Protein, UA 11/18/2021 100 mg/dL (2+)* Negative Final   • Leuk Esterase, UA 11/18/2021 Negative  Negative Final   • Nitrite, UA 11/18/2021 Negative  Negative Final   • Urobilinogen, UA 11/18/2021 1.0 E.U./dL  0.2 - 1.0 E.U./dL Final   • Lipase 11/18/2021 26  13 - 60 U/L Final   • proBNP 11/18/2021 573.5  0.0-1,800.0 pg/mL Final   • Troponin T 11/18/2021 <0.010  0.000 - 0.030 ng/mL Final   • WBC 11/18/2021 4.19  3.40 - 10.80 10*3/mm3 Final   • RBC 11/18/2021 3.20* 3.77 - 5.28 10*6/mm3 Final   • Hemoglobin 11/18/2021 9.5* 12.0 - 15.9 g/dL Final   • Hematocrit 11/18/2021 29.7* 34.0 - 46.6 % Final   • MCV 11/18/2021 92.8  79.0 - 97.0 fL Final   • MCH 11/18/2021 29.7  26.6 - 33.0 pg Final   • MCHC 11/18/2021 32.0  31.5 - 35.7 g/dL Final   • RDW 11/18/2021 14.7  12.3 - 15.4 % Final   • RDW-SD 11/18/2021 50.6  37.0 - 54.0 fl Final   • MPV 11/18/2021 11.5  6.0 - 12.0 fL Final   • Platelets 11/18/2021 202  140 - 450 10*3/mm3 Final   • Neutrophil % 11/18/2021 70.7  42.7 - 76.0 % Final   • Lymphocyte % 11/18/2021 15.5* 19.6 - 45.3 % Final   • Monocyte % 11/18/2021 12.6* 5.0 - 12.0 % Final   • Eosinophil % 11/18/2021 0.5  0.3 - 6.2 % Final   • Basophil % 11/18/2021 0.5  0.0 - 1.5 % Final   • Immature Grans % 11/18/2021 0.2  0.0 - 0.5 % Final   • Neutrophils, Absolute 11/18/2021 2.96  1.70 - 7.00 10*3/mm3 Final   • Lymphocytes, Absolute 11/18/2021 0.65* 0.70 - 3.10 10*3/mm3 Final   • Monocytes, Absolute 11/18/2021 0.53  0.10 -  0.90 10*3/mm3 Final   • Eosinophils, Absolute 11/18/2021 0.02  0.00 - 0.40 10*3/mm3 Final   • Basophils, Absolute 11/18/2021 0.02  0.00 - 0.20 10*3/mm3 Final   • Immature Grans, Absolute 11/18/2021 0.01  0.00 - 0.05 10*3/mm3 Final   • nRBC 11/18/2021 0.0  0.0 - 0.2 /100 WBC Final   • ADENOVIRUS, PCR 11/18/2021 Not Detected  Not Detected Final   • Coronavirus 229E 11/18/2021 Not Detected  Not Detected Final   • Coronavirus HKU1 11/18/2021 Not Detected  Not Detected Final   • Coronavirus NL63 11/18/2021 Not Detected  Not Detected Final   • Coronavirus OC43 11/18/2021 Not Detected  Not Detected Final   • COVID19 11/18/2021 Not Detected  Not Detected - Ref. Range Final   • Human Metapneumovirus 11/18/2021 Not Detected  Not Detected Final   • Human Rhinovirus/Enterovirus 11/18/2021 Not Detected  Not Detected Final   • Influenza A PCR 11/18/2021 Not Detected  Not Detected Final   • Influenza B PCR 11/18/2021 Not Detected  Not Detected Final   • Parainfluenza Virus 1 11/18/2021 Not Detected  Not Detected Final   • Parainfluenza Virus 2 11/18/2021 Not Detected  Not Detected Final   • Parainfluenza Virus 3 11/18/2021 Not Detected  Not Detected Final   • Parainfluenza Virus 4 11/18/2021 Not Detected  Not Detected Final   • RSV, PCR 11/18/2021 Not Detected  Not Detected Final   • Bordetella pertussis pcr 11/18/2021 Not Detected  Not Detected Final   • Bordetella parapertussis PCR 11/18/2021 Not Detected  Not Detected Final   • Chlamydophila pneumoniae PCR 11/18/2021 Not Detected  Not Detected Final   • Mycoplasma pneumo by PCR 11/18/2021 Not Detected  Not Detected Final   • RBC Morphology 11/18/2021 Normal  Normal Final   • WBC Morphology 11/18/2021 Normal  Normal Final   • Platelet Morphology 11/18/2021 Normal  Normal Final   • RBC, UA 11/18/2021 3-6* None Seen, 0-2 /HPF Final   • WBC, UA 11/18/2021 6-12* None Seen, 0-2 /HPF Final   • Bacteria, UA 11/18/2021 3+* None Seen, Trace /HPF Final   • Squamous Epithelial Cells, UA  11/18/2021 13-20* None Seen, 0-2 /HPF Final   • Hyaline Casts, UA 11/18/2021 Unable to determine due to loaded field  0 - 6 /LPF Final   • Coarse Granular Casts, UA 11/18/2021 0-2  None Seen /LPF Final   • Mucus, UA 11/18/2021 Small/1+* None Seen, Trace /HPF Final   • Methodology 11/18/2021 Manual Light Microscopy   Final   • Lactate 11/18/2021 1.3  0.5 - 2.0 mmol/L Final    Falsely depressed results may occur on samples drawn from patients receiving N-Acetylcysteine (NAC) or Metamizole.   • Urine Culture 11/18/2021 No growth   Preliminary   • Phosphorus 11/18/2021 2.9  2.5 - 4.5 mg/dL Final        No results found.    ASSESSMENT 76 years old female with bulky lymphadenopathy    PROBLEM LIST   1.  Bulky lymphadenopathy:  A.  Presented with unexplained weight loss and fatigue  B.  CT abdomen pelvis with IV contrast done on November 18, 2021 revealed 14 cm mass next to the left renal pelvis  2.  Normocytic anemia  3.  Hypertension  4.  Hypercholesteremia    PLAN  1.  I reviewed the patient's chart including admission note blood work results and imaging report.  2.  I explained to the patient that the most current pictures are most consistent with lymphoma.  3.  I will check serum LDH and uric acid.  4.  I recommend CT-guided core biopsy for diagnosis.  I recommend bone marrow biopsy for staging.  5.  I will set her up for PET scan as an outpatient.  I will order cardiac echo as an inpatient.  6.  Treatment decisions will be based on the results.  7.  I discussed the case with the hospitalist as well as interventional radiologist to coordinate patient care.  Patient can be discharged home after getting her echo done.  I will set her up to be due for bone marrow biopsy and mass biopsy at the same time and follow-up with me the following week to go over the results.    Adelaida Al MD    11/19/2021

## 2021-11-19 NOTE — CASE MANAGEMENT/SOCIAL WORK
Continued Stay Note  [facility]     Patient Name: Vale Cee  MRN: [id number]  Today's Date: 11/19/2021    Admit Date: 11/18/2021     Discharge Plan     Row Name 11/19/21 1516       Plan    Plan home    Patient/Family in Agreement with Plan yes    Plan Comments I talked with son in hallway and plan is possible home today. Patient has transportation home and great family support. No d/c needs noted. Karol @ [phone]    Final Discharge Disposition Code 01 - home or self-care               Discharge Codes    No documentation.                     Elyse Pacheco RN

## 2021-11-19 NOTE — DISCHARGE INSTRUCTIONS
PCP 1-2 weeks    Dr. Al as scheduled 12/3/21    Dr. Sánchez 2-3 weeks    Will be having outpatient CT guided biopsy 11/24/21, PET CT 11/30/21

## 2021-11-19 NOTE — DISCHARGE SUMMARY
[facility]  DISCHARGE SUMMARY    Patient Name: Vale Cee  : 1945  MRN: [id number]    Date of Admission: 2021  8:27 AM  Date of Discharge:  21  Primary Care Physician: Sarah Redman APRN    Consults     Date and Time Order Name Status Description    2021 12:35 AM Inpatient Urology Consult      2021 12:35 AM Inpatient Hematology & Oncology Consult Completed           Hospital Course     Presenting Problem:   Renal mass [N28.89]    Active Hospital Problems    Diagnosis  POA   • Severe malnutrition (HCC) [E43]  Yes   • Renal mass [N28.89]  Yes   • Serum albumin decreased [E88.09]  Yes   • Dehydration [E86.0]  Yes   • Unexplained night sweats [R61]  Yes   • Anemia [D64.9]  Yes   • Inadequate oral nutritional intake [E63.9]  Yes   • Psoriasis [L40.9]  Yes   • Retroperitoneal mass [R19.00]  Yes   • Hydroureter, left [N13.4]  Yes   • Hydronephrosis, left [N13.30]  Yes   • 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          Hospital Course:  Vale Cee is a 76 y.o. female  with history of skin cancer, hypertension, kidney stones, hyperlipidemia, vertebral fracture, tobacco use disorder who has been failing to thrive for 3 months.  She reports going to her PCP now every couple of weeks and medication adjustments are made but she continues to get weaker and has no appetite and is progressively loosing weight.  She reports only eating roughly 200 calories per day and has had low blood pressure and having to be taken off a fluid pill recently due to low blood pressure.  She was admitted to the hospitalist service    Generalized Weakness / Fatigue  - failure to improve in the outpatient setting  - s/p IVF  - clearly has not been eating enough calories to support herself    Renal Mass / Retroperitoneal Mass  - unclear origin of process but felt to be a lymphomatous  process  - outpatient CT guided biopsy planned  - Heme/Onc, Dr. Al has seen  - Urology, Dr. Sánchez has seen  - ECHO done prior to dc for prelim chemo work-up, results pending.  Heme-onc will follow    Hypertension  - has been struggling from low blood pressure recently and maintenance meds dc'd    Left HydroUreter  Left HydroNephrosis  - hold off on placing ureteral stent since she has normal kidney function and no significant flank pain    Poor Oral Intake  Severe Malnutrition  - only taking 200 calories per day for last 3 months  - supplemental shakes  - continue remeron    New Onset Anemia  - heme-onc to follow    Tobacco Use Disorder  - smoker starting at age 15 (smoker for 61 yrs)  - encourage smoking cessation  - nicotine patch ordered but not using while here    Possible UTI  - recently treated for UTI for 7 days  - received 1 dose of rocephin but dc'd due to being asymptomatic and culture without growth       Discharge Follow Up Recommendations for outpatient labs/diagnostics:  PCP 1-2 weeks  Dr. Al as scheduled 12/3/21  Dr. Sánchez 2-3 weeks  Will be having outpatient CT guided biopsy 11/24/21, PET CT 11/30/21    Day of Discharge     HPI:   No new issues overnight  Poor appetite continues per son    Review of Systems  Gen- No fevers, chills  CV- No chest pain, palpitations  Resp- No cough, dyspnea  GI- No N/V/D, abd pain      Vital Signs:   Temp:  [97.5 °F (36.4 °C)-100 °F (37.8 °C)] 97.6 °F (36.4 °C)  Heart Rate:  [64-84] 84  Resp:  [16-18] 18  BP: (108-129)/(56-68) 108/58     Physical Exam:  Constitutional: No acute distress, awake, alert, son at bedside  HENT: NCAT, mucous membranes moist  Respiratory: Clear to auscultation bilaterally, respiratory effort normal   Cardiovascular: RRR, no murmurs, rubs, or gallops  Gastrointestinal: Positive bowel sounds, soft, nontender, nondistended  Musculoskeletal: No bilateral ankle edema  Psychiatric: Appropriate affect, cooperative  Neurologic: Oriented x  3, ANDREWS, speech clear  Skin: No rashes      Pertinent  and/or Most Recent Results     LAB RESULTS:      Lab 11/19/21  0849 11/18/21  1258 11/18/21  0929   WBC 3.44  --  4.19   HEMOGLOBIN 8.5*  --  9.5*   HEMATOCRIT 26.7*  --  29.7*   PLATELETS 202  --  202   NEUTROS ABS 2.29  --  2.96   IMMATURE GRANS (ABS) 0.01  --  0.01   LYMPHS ABS 0.74  --  0.65*   MONOS ABS 0.35  --  0.53   EOS ABS 0.03  --  0.02   MCV 95.4  --  92.8   LACTATE  --  1.3  --    *  --   --    PROTIME 15.5*  --   --          Lab 11/19/21  0849 11/18/21  2107 11/18/21  0929   SODIUM 139  --  137   POTASSIUM 3.2*  --  3.8   CHLORIDE 105  --  100   CO2 25.0  --  26.0   ANION GAP 9.0  --  11.0   BUN 10  --  14   CREATININE 0.78  --  0.82   GLUCOSE 119*  --  105*   CALCIUM 8.4*  --  8.9   PHOSPHORUS  --  2.9  --          Lab 11/18/21  0929   TOTAL PROTEIN 6.3   ALBUMIN 3.20*   GLOBULIN 3.1   ALT (SGPT) 8   AST (SGOT) 28   BILIRUBIN 0.8   ALK PHOS 76   LIPASE 26         Lab 11/19/21  0849 11/18/21  0958 11/18/21  0929   PROBNP  --   --  573.5   TROPONIN T  --  <0.010  --    PROTIME 15.5*  --   --    INR 1.27*  --   --              Lab 11/19/21  0849   IRON 26*   IRON SATURATION 14*   TIBC 192*   TRANSFERRIN 129*   FERRITIN 794.60*         Brief Urine Lab Results  (Last result in the past 365 days)      Color   Clarity   Blood   Leuk Est   Nitrite   Protein   CREAT   Urine HCG        11/18/21 0924 Yellow   Cloudy   Small (1+)   Negative   Negative   100 mg/dL (2+)               Microbiology Results (last 10 days)     Procedure Component Value - Date/Time    COVID PRE-OP / PRE-PROCEDURE SCREENING ORDER (NO ISOLATION) - Swab, Nasopharynx [[id number]]  (Normal) Collected: 11/18/21 0924    Lab Status: Final result Specimen: Swab from Nasopharynx Updated: 11/18/21 1044    Narrative:      The following orders were created for panel order COVID PRE-OP / PRE-PROCEDURE SCREENING ORDER (NO ISOLATION) - Swab, Nasopharynx.  Procedure                                Abnormality         Status                     ---------                               -----------         ------                     Respiratory Panel PCR w/...[[id number]]  Normal              Final result                 Please view results for these tests on the individual orders.    Respiratory Panel PCR w/COVID-19(SARS-CoV-2) DANIELLE/SHELL/MARTA/PAD/COR/MAD/KIM In-House, NP Swab in UTM/VTM, 3-4 HR TAT - Swab, Nasopharynx [[id number]]  (Normal) Collected: 11/18/21 0924    Lab Status: Final result Specimen: Swab from Nasopharynx Updated: 11/18/21 1044     ADENOVIRUS, PCR Not Detected     Coronavirus 229E Not Detected     Coronavirus HKU1 Not Detected     Coronavirus NL63 Not Detected     Coronavirus OC43 Not Detected     COVID19 Not Detected     Human Metapneumovirus Not Detected     Human Rhinovirus/Enterovirus Not Detected     Influenza A PCR Not Detected     Influenza B PCR Not Detected     Parainfluenza Virus 1 Not Detected     Parainfluenza Virus 2 Not Detected     Parainfluenza Virus 3 Not Detected     Parainfluenza Virus 4 Not Detected     RSV, PCR Not Detected     Bordetella pertussis pcr Not Detected     Bordetella parapertussis PCR Not Detected     Chlamydophila pneumoniae PCR Not Detected     Mycoplasma pneumo by PCR Not Detected    Narrative:      In the setting of a positive respiratory panel with a viral infection PLUS a negative procalcitonin without other underlying concern for bacterial infection, consider observing off antibiotics or discontinuation of antibiotics and continue supportive care. If the respiratory panel is positive for atypical bacterial infection (Bordetella pertussis, Chlamydophila pneumoniae, or Mycoplasma pneumoniae), consider antibiotic de-escalation to target atypical bacterial infection.    Urine Culture - Urine, Urine, Clean Catch [[id number]]  (Normal) Collected: 11/18/21 0924    Lab Status: Preliminary result Specimen: Urine, Clean Catch Updated: 11/19/21 0650     Urine Culture  No growth          CT Abdomen Pelvis Without Contrast    Result Date: 11/18/2021  EXAMINATION: CT CHEST WO CONTRAST DIAGNOSTIC-, CT ABDOMEN PELVIS WO CONTRAST-  INDICATION: general weakness.  Cough.  TECHNIQUE: CT of the chest abdomen and pelvis without contrast.  COMPARISON: NONE  FINDINGS:  Heterogeneous attenuation of the thyroid gland with likely small nodules. No bulky mediastinal or hilar adenopathy. Normal size of the heart without evidence of pericardial effusion. Coronary artery calcifications are seen. Mild atherosclerosis of the thoracic aorta which appears normal in caliber. The thoracic esophagus and remaining mediastinal structures are unremarkable. The trachea and bronchi are patent. There is mild-to-moderate centrilobular upper lobe predominant emphysematous change throughout the lungs. Mild streaky atelectasis or scarring at the lung bases. No focal consolidation. No pulmonary mass or suspicious pulmonary nodule. No pleural effusion or pneumothorax.  There is large bulky retroperitoneal and mesenteric lymphadenopathy with large retroperitoneal mich conglomerate measuring up to 6.9 x 10.5 cm in transverse dimension and 14.0 cm in craniocaudal length. There is associated moderate hydronephrosis of the left kidney (series 301 image 54). The right kidney is unremarkable.  The liver is unremarkable. The gallbladder is not well seen, likely decompressed. Normal appearance of the spleen without splenomegaly. Unremarkable appearance of the pancreas. The adrenal glands appear normal. No bowel obstruction. There is colonic diverticulosis without diverticulitis. No free intra-abdominal fluid or pneumoperitoneum. Unremarkable appearance of the uterus. The ovaries are not well-visualized. No acute osseous findings. Chronic appearing moderate superior endplate compression deformity of L3 with cement augmentation.       There is a large lobular retroperitoneal soft tissue mass measuring 6.9 x 10.5 x 14.0 cm,  suboptimally assessed on this noncontrast exam. This abuts the inferior pole the left kidney. Overall this finding is concerning for large lymphadenopathy/mich conglomerate. This results in moderate left-sided hydroureteronephrosis. Contrast-enhanced CT may help further characterize and delineate this mass.  No acute thoracic findings. Mild-to-moderate centrilobular emphysema.   This report was finalized on 11/18/2021 11:57 AM by Satnam Valero MD.      CT Head Without Contrast    Result Date: 11/18/2021  EXAMINATION: CT HEAD WO CONTRAST-  INDICATION: Generalized weakness.  Previous CVA.  TECHNIQUE: Noncontrast head CT  COMPARISON: NONE  FINDINGS:  No acute intracranial hemorrhage. No acute large territory infarct. Minimal subcortical and periventricular white matter hypodensities, nonspecific finding which can be seen in setting of chronic small vessel ischemic change. No extra-axial collections. Normal size and configuration of the ventricles. No midline shift or herniation. Unremarkable appearance of the orbits. No acute osseous findings. The mastoid air cells and paranasal sinuses are clear.       No acute intracranial findings.   This report was finalized on 11/18/2021 11:41 AM by Satnam Valero MD.      CT Chest Without Contrast Diagnostic    Result Date: 11/18/2021  EXAMINATION: CT CHEST WO CONTRAST DIAGNOSTIC-, CT ABDOMEN PELVIS WO CONTRAST-  INDICATION: general weakness.  Cough.  TECHNIQUE: CT of the chest abdomen and pelvis without contrast.  COMPARISON: NONE  FINDINGS:  Heterogeneous attenuation of the thyroid gland with likely small nodules. No bulky mediastinal or hilar adenopathy. Normal size of the heart without evidence of pericardial effusion. Coronary artery calcifications are seen. Mild atherosclerosis of the thoracic aorta which appears normal in caliber. The thoracic esophagus and remaining mediastinal structures are unremarkable. The trachea and bronchi are patent. There is mild-to-moderate  centrilobular upper lobe predominant emphysematous change throughout the lungs. Mild streaky atelectasis or scarring at the lung bases. No focal consolidation. No pulmonary mass or suspicious pulmonary nodule. No pleural effusion or pneumothorax.  There is large bulky retroperitoneal and mesenteric lymphadenopathy with large retroperitoneal mich conglomerate measuring up to 6.9 x 10.5 cm in transverse dimension and 14.0 cm in craniocaudal length. There is associated moderate hydronephrosis of the left kidney (series 301 image 54). The right kidney is unremarkable.  The liver is unremarkable. The gallbladder is not well seen, likely decompressed. Normal appearance of the spleen without splenomegaly. Unremarkable appearance of the pancreas. The adrenal glands appear normal. No bowel obstruction. There is colonic diverticulosis without diverticulitis. No free intra-abdominal fluid or pneumoperitoneum. Unremarkable appearance of the uterus. The ovaries are not well-visualized. No acute osseous findings. Chronic appearing moderate superior endplate compression deformity of L3 with cement augmentation.       There is a large lobular retroperitoneal soft tissue mass measuring 6.9 x 10.5 x 14.0 cm, suboptimally assessed on this noncontrast exam. This abuts the inferior pole the left kidney. Overall this finding is concerning for large lymphadenopathy/mich conglomerate. This results in moderate left-sided hydroureteronephrosis. Contrast-enhanced CT may help further characterize and delineate this mass.  No acute thoracic findings. Mild-to-moderate centrilobular emphysema.   This report was finalized on 11/18/2021 11:57 AM by Satnam Valero MD.      CT Abdomen Pelvis With Contrast    Result Date: 11/18/2021  EXAMINATION: CT ABDOMEN PELVIS W CONTRAST-  INDICATION: left Renal Mass; N28.89-Other specified disorders of kidney and ureter; N39.0-Urinary tract infection, site not specified; Z78.9-Other specified health status;  D64.9-Anemia, unspecified  TECHNIQUE: CT the abdomen and pelvis with IV contrast  COMPARISON: Same-day noncontrast CT abdomen pelvis  FINDINGS:  Redemonstration of a large lobulated retroperitoneal soft tissue mass measuring 5.4 x 10.6 x 14.8 cm, suspicious for retroperitoneal lymphadenopathy/mich conglomerate. This extends to the lower pole the left kidney where there is abnormal hypodense attenuation of the lower pole cortex (series 900 image 50). There is resultant moderate left-sided hydroureter nephrosis from mass effect. There appears to be mild-to-moderate regions of narrowing of the left renal vein which remains patent. The left renal artery passes through the mass without significant narrowing. The atherosclerotic abdominal aorta appears patent without significant narrowing. There is mild delayed enhancement and excretion of the left kidney. There are a few mildly enlarged lymph nodes more inferiorly along the iliac chains.  The lung bases and lower thorax are unremarkable with the exception of mild right lung base atelectasis. The gallbladder is not visualized, possibly surgically absent or decompressed. Normal appearance of the spleen without splenomegaly. The adrenal glands are unremarkable. Colonic diverticulosis without diverticulitis. No bowel obstruction. Unremarkable appearance of the uterus. The ovaries are not well-visualized. No free intra-abdominal fluid or conspicuous mesenteric fat stranding. No pneumoperitoneum. No acute osseous findings. Chronic moderate superior endplate compression deformity with cement augmentation of L3.       Redemonstration of a large lobulated retroperitoneal soft tissue mass measuring 5.4 x 10.6 x 14.8 cm, suspicious for lymphadenopathy/mich conglomerate, compatible with lymphomatous process. The left margin of this conglomerate extends to the inferior pole the left kidney where there is contiguous abnormal soft tissue in attenuation into the renal cortex. It is  unclear whether this represents lymphomatous extension/involvement into the kidney, or whether this reflects renal lymphoma with retroperitoneal extension. There is moderate left-sided hydronephrosis from mass effect, as well as mild-to-moderate regions of narrowing of the left renal vein. There is mild delayed enhancement and excretion of the left kidney.   This report was finalized on 11/18/2021 2:50 PM by Satnam Valero MD.          Pending Labs     Order Current Status    Urine Culture - Urine, Urine, Clean Catch Preliminary result        Discharge Details        Discharge Medications      New Medications      Instructions Start Date   acetaminophen 500 MG tablet  Commonly known as: TYLENOL   500 mg, Oral, Every 6 Hours PRN      mirtazapine 15 MG tablet  Commonly known as: REMERON   15 mg, Oral, Nightly         Continue These Medications      Instructions Start Date   Aspirin 81 81 MG chewable tablet  Generic drug: aspirin   Aspir-81      metoprolol succinate XL 25 MG 24 hr tablet  Commonly known as: TOPROL-XL   1 tablet, Oral, Daily      OTEZLA PO   30 mg      rosuvastatin 20 MG tablet  Commonly known as: CRESTOR   20 mg, Oral             Allergies   Allergen Reactions   • Codeine Nausea And Vomiting   • Penicillins Swelling         Discharge Disposition:  Home or Self Care    Diet:  Hospital:  Diet Order   Procedures   • Diet Regular       Activity:  Activity Instructions     Activity as Tolerated               CODE STATUS:    Code Status and Medical Interventions:   Ordered at: 11/18/21 1850     Level Of Support Discussed With:    Patient     Code Status (Patient has no pulse and is not breathing):    CPR (Attempt to Resuscitate)     Medical Interventions (Patient has pulse or is breathing):    Full Support       Future Appointments   Date Time Provider Department Center   11/24/2021  7:30 AM [facility] CT 3  [facility] CT [facility]   11/30/2021  8:45 AM [facility] PET ADMIN RM1  [facility] PETCT [facility]   11/30/2021  9:45 AM [facility]  [facility] PETCT 1 [facility] PETCT [facility]   12/3/2021  8:00 AM Adelaida Al MD [facility]       Additional Instructions for the Follow-ups that You Need to Schedule     Discharge Follow-up with PCP   As directed       Currently Documented PCP:    Sarah Redman APRN    PCP Phone Number:    [phone]     Follow Up Details: 1-2 weeks         Discharge Follow-up with Specified Provider: Mikaela as scheduled   As directed      To: Mikaela as scheduled         Discharge Follow-up with Specified Provider: Dr. Sánchez, 2-3weeks   As directed      To: Dr. Sánchez, 2-3weeks               MARIA Tomas  11/19/21      Time Spent on Discharge:  I spent 40 minutes on this discharge activity which included: face-to-face encounter with the patient, reviewing the data in the system, coordination of the care with the nursing staff as well as consultants, documentation, and entering orders.

## 2021-11-19 NOTE — PLAN OF CARE
Goal Outcome Evaluation:  Plan of Care Reviewed With: patient        Progress: improving  Pt VSS, denies pain, nausea well controlled today. Tolerating Regular diet. Still only eating small amounts. Ambulating. Urinating. Echo completed. Pt to follow up with Dr. Mikaela looney pt.   Problem: Fall Injury Risk  Goal: Absence of Fall and Fall-Related Injury  Outcome: Met  Intervention: Identify and Manage Contributors to Fall Injury Risk  Recent Flowsheet Documentation  Taken 11/19/2021 0800 by Talya Wagner RN  Medication Review/Management: medications reviewed  Intervention: Promote Injury-Free Environment  Recent Flowsheet Documentation  Taken 11/19/2021 1400 by Talya Wagner RN  Safety Promotion/Fall Prevention:   activity supervised   fall prevention program maintained   nonskid shoes/slippers when out of bed   safety round/check completed  Taken 11/19/2021 1200 by Talya Wagner RN  Safety Promotion/Fall Prevention:   activity supervised   fall prevention program maintained   nonskid shoes/slippers when out of bed   safety round/check completed  Taken 11/19/2021 1000 by Talya Wagner RN  Safety Promotion/Fall Prevention:   activity supervised   fall prevention program maintained   nonskid shoes/slippers when out of bed   safety round/check completed  Taken 11/19/2021 0800 by Talya Wagner RN  Safety Promotion/Fall Prevention:   activity supervised   fall prevention program maintained   nonskid shoes/slippers when out of bed   safety round/check completed

## 2021-11-19 NOTE — CONSULTS
Consult    Patient Name: Vale Cee  Medical Record Number: [id number]  YOB: 1945    Date of consultation: 11/18/2021    Referring Provider: Shayla  Reason for Consultation: Retroperitoneal mass    Patient Care Team:  Sarah Redman APRN as PCP - General    Chief complaint   Chief Complaint   Patient presents with   • Weakness - Generalized       Subjective .     History of present illness:    Patient is a pleasant 76-year-old female that we follow in the office for nephrolithiasis.  She was last seen in October with a negative KUB.  Over the past couple of weeks she has noted that she has been extremely fatigued.  She has occasional abdominal pain which is generalized.  She has a loss of appetite.  She denies hematuria.  She denies flank pain.    CT scan imaging was reviewed.  Initially CT scan without contrast was performed suggesting left renal mass with significant retroperitoneal extension.  Contrasted scan was also performed which demonstrates what appears to be retroperitoneal adenopathy with impinging on the lower pole of the left kidney with extrinsic compression or involvement.  It is suggested this is more of a primary lymph node process rather than primary renal origin.  There is some hydronephrosis from extrinsic compression of the mass.    History reviewed. No pertinent past medical history.  History reviewed. No pertinent surgical history.  History reviewed. No pertinent family history.  Social History     Tobacco Use   • Smoking status: Former Smoker   • Smokeless tobacco: Not on file   Substance Use Topics   • Alcohol use: Never   • Drug use: Never     Medications Prior to Admission   Medication Sig Dispense Refill Last Dose   • metoprolol succinate XL (TOPROL-XL) 25 MG 24 hr tablet Take 1 tablet by mouth Daily.      • rosuvastatin (CRESTOR) 20 MG tablet Take 20 mg by mouth.      • Apremilast (OTEZLA PO) 30 mg.      • aspirin (Aspirin 81) 81 MG chewable tablet Aspir-81     "    Allergies:  Codeine and Penicillins    Review of Systems  Pertinent items are noted in HPI, all other systems reviewed and negative    Objective     Vital Signs   /56 (BP Location: Right arm, Patient Position: Sitting)   Pulse 65   Temp 98.4 °F (36.9 °C) (Axillary)   Resp 16   Ht 165.1 cm (65\")   Wt 63.5 kg (140 lb)   SpO2 97%   BMI 23.30 kg/m²     Physical Exam:  General Appearance: No apparent distress  Back: No No kyphosis present, no scoliosis present,no tenderness to percussion or Palpation  Lungs: Respirations regular, even and  unlabored  Heart: Regular rhythm and normal rate  Chest Wall: No abnormalities observed  Abdomen: Benign no costovertebral angle tenderness  Genital: Deferred  Rectal: Deferred  Extremities: Moves all extremities well, no edema no cyanosis, no redness  Pulses: Pulses palpable  Skin: No bleeding, bruising or rash  Lymph nodes: No palpable adenopathy  Neurologic: Neurologically grossly intact    Results Review:  Lab Results (last 24 hours)     Procedure Component Value Units Date/Time    Lactic Acid, Plasma [[id number]]  (Normal) Collected: 11/18/21 1258    Specimen: Blood Updated: 11/18/21 1330     Lactate 1.3 mmol/L      Comment: Falsely depressed results may occur on samples drawn from patients receiving N-Acetylcysteine (NAC) or Metamizole.       Urine Culture - Urine, Urine, Clean Catch [[id number]] Collected: 11/18/21 0924    Specimen: Urine, Clean Catch Updated: 11/18/21 1314    COVID PRE-OP / PRE-PROCEDURE SCREENING ORDER (NO ISOLATION) - Swab, Nasopharynx [[id number]]  (Normal) Collected: 11/18/21 0924    Specimen: Swab from Nasopharynx Updated: 11/18/21 1044    Narrative:      The following orders were created for panel order COVID PRE-OP / PRE-PROCEDURE SCREENING ORDER (NO ISOLATION) - Swab, Nasopharynx.  Procedure                               Abnormality         Status                     ---------                               -----------         ------        "              Respiratory Panel PCR w/...[[id number]]  Normal              Final result                 Please view results for these tests on the individual orders.    Respiratory Panel PCR w/COVID-19(SARS-CoV-2) DANIELLE/SHELL/MARTA/PAD/COR/MAD/KIM In-House, NP Swab in UTM/VTM, 3-4 HR TAT - Swab, Nasopharynx [[id number]]  (Normal) Collected: 11/18/21 0924    Specimen: Swab from Nasopharynx Updated: 11/18/21 1044     ADENOVIRUS, PCR Not Detected     Coronavirus 229E Not Detected     Coronavirus HKU1 Not Detected     Coronavirus NL63 Not Detected     Coronavirus OC43 Not Detected     COVID19 Not Detected     Human Metapneumovirus Not Detected     Human Rhinovirus/Enterovirus Not Detected     Influenza A PCR Not Detected     Influenza B PCR Not Detected     Parainfluenza Virus 1 Not Detected     Parainfluenza Virus 2 Not Detected     Parainfluenza Virus 3 Not Detected     Parainfluenza Virus 4 Not Detected     RSV, PCR Not Detected     Bordetella pertussis pcr Not Detected     Bordetella parapertussis PCR Not Detected     Chlamydophila pneumoniae PCR Not Detected     Mycoplasma pneumo by PCR Not Detected    Narrative:      In the setting of a positive respiratory panel with a viral infection PLUS a negative procalcitonin without other underlying concern for bacterial infection, consider observing off antibiotics or discontinuation of antibiotics and continue supportive care. If the respiratory panel is positive for atypical bacterial infection (Bordetella pertussis, Chlamydophila pneumoniae, or Mycoplasma pneumoniae), consider antibiotic de-escalation to target atypical bacterial infection.    Troponin [[id number]]  (Normal) Collected: 11/18/21 0958    Specimen: Blood Updated: 11/18/21 1034     Troponin T <0.010 ng/mL     Narrative:      Troponin T Reference Range:  <= 0.03 ng/mL-   Negative for AMI  >0.03 ng/mL-     Abnormal for myocardial necrosis.  Clinicians would have to utilize clinical acumen, EKG, Troponin and serial  changes to determine if it is an Acute Myocardial Infarction or myocardial injury due to an underlying chronic condition.       Results may be falsely decreased if patient taking Biotin.      Urinalysis, Microscopic Only - Urine, Clean Catch [[id number]]  (Abnormal) Collected: 11/18/21 0924    Specimen: Urine, Clean Catch Updated: 11/18/21 1029     RBC, UA 3-6 /HPF      WBC, UA 6-12 /HPF      Bacteria, UA 3+ /HPF      Squamous Epithelial Cells, UA 13-20 /HPF      Hyaline Casts, UA       Unable to determine due to loaded field     /LPF     Coarse Granular Casts, UA 0-2 /LPF      Mucus, UA Small/1+ /HPF      Methodology Manual Light Microscopy    CBC & Differential [[id number]]  (Abnormal) Collected: 11/18/21 0929    Specimen: Blood Updated: 11/18/21 1029    Narrative:      The following orders were created for panel order CBC & Differential.  Procedure                               Abnormality         Status                     ---------                               -----------         ------                     CBC Auto Differential[[id number]]        Abnormal            Final result               Scan Slide[[id number]]                   Normal              Final result                 Please view results for these tests on the individual orders.    Scan Slide [[id number]]  (Normal) Collected: 11/18/21 0929    Specimen: Blood Updated: 11/18/21 1029     RBC Morphology Normal     WBC Morphology Normal     Platelet Morphology Normal    CBC Auto Differential [[id number]]  (Abnormal) Collected: 11/18/21 0929    Specimen: Blood Updated: 11/18/21 1029     WBC 4.19 10*3/mm3      RBC 3.20 10*6/mm3      Hemoglobin 9.5 g/dL      Hematocrit 29.7 %      MCV 92.8 fL      MCH 29.7 pg      MCHC 32.0 g/dL      RDW 14.7 %      RDW-SD 50.6 fl      MPV 11.5 fL      Platelets 202 10*3/mm3      Neutrophil % 70.7 %      Lymphocyte % 15.5 %      Monocyte % 12.6 %      Eosinophil % 0.5 %      Basophil % 0.5 %      Immature Grans % 0.2 %       Neutrophils, Absolute 2.96 10*3/mm3      Lymphocytes, Absolute 0.65 10*3/mm3      Monocytes, Absolute 0.53 10*3/mm3      Eosinophils, Absolute 0.02 10*3/mm3      Basophils, Absolute 0.02 10*3/mm3      Immature Grans, Absolute 0.01 10*3/mm3      nRBC 0.0 /100 WBC     Narrative:      Appended report. These results have been appended to a previously verified report.    BNP [[id number]]  (Normal) Collected: 11/18/21 0929    Specimen: Blood Updated: 11/18/21 1026     proBNP 573.5 pg/mL     Narrative:      Among patients with dyspnea, NT-proBNP is highly sensitive for the detection of acute congestive heart failure. In addition NT-proBNP of <300 pg/ml effectively rules out acute congestive heart failure with 99% negative predictive value.    Results may be falsely decreased if patient taking Biotin.      Comprehensive Metabolic Panel [[id number]]  (Abnormal) Collected: 11/18/21 0929    Specimen: Blood Updated: 11/18/21 1019     Glucose 105 mg/dL      BUN 14 mg/dL      Creatinine 0.82 mg/dL      Sodium 137 mmol/L      Potassium 3.8 mmol/L      Comment: Slight hemolysis detected by analyzer. Results may be affected.        Chloride 100 mmol/L      CO2 26.0 mmol/L      Calcium 8.9 mg/dL      Total Protein 6.3 g/dL      Albumin 3.20 g/dL      ALT (SGPT) 8 U/L      AST (SGOT) 28 U/L      Alkaline Phosphatase 76 U/L      Total Bilirubin 0.8 mg/dL      eGFR Non African Amer 67 mL/min/1.73      Globulin 3.1 gm/dL      Comment: Calculated Result        A/G Ratio 1.0 g/dL      BUN/Creatinine Ratio 17.1     Anion Gap 11.0 mmol/L     Narrative:      GFR Normal >60  Chronic Kidney Disease <60  Kidney Failure <15      Lipase [[id number]]  (Normal) Collected: 11/18/21 0929    Specimen: Blood Updated: 11/18/21 1019     Lipase 26 U/L     Urinalysis With Microscopic If Indicated (No Culture) - Urine, Clean Catch [[id number]]  (Abnormal) Collected: 11/18/21 0924    Specimen: Urine, Clean Catch Updated: 11/18/21 0957     Color, UA Yellow      Appearance, UA Cloudy     pH, UA 5.5     Specific Gravity, UA 1.018     Glucose, UA Negative     Ketones, UA Trace     Bilirubin, UA Negative     Blood, UA Small (1+)     Protein,  mg/dL (2+)     Leuk Esterase, UA Negative     Nitrite, UA Negative     Urobilinogen, UA 1.0 E.U./dL        Imaging Results (Last 72 Hours)     Procedure Component Value Units Date/Time    CT Abdomen Pelvis With Contrast [[id number]] Collected: 11/18/21 1434     Updated: 11/18/21 1453    Narrative:      EXAMINATION: CT ABDOMEN PELVIS W CONTRAST-      INDICATION: left Renal Mass; N28.89-Other specified disorders of kidney  and ureter; N39.0-Urinary tract infection, site not specified;  Z78.9-Other specified health status; D64.9-Anemia, unspecified     TECHNIQUE: CT the abdomen and pelvis with IV contrast     COMPARISON: Same-day noncontrast CT abdomen pelvis     FINDINGS:      Redemonstration of a large lobulated retroperitoneal soft tissue mass  measuring 5.4 x 10.6 x 14.8 cm, suspicious for retroperitoneal  lymphadenopathy/mich conglomerate. This extends to the lower pole the  left kidney where there is abnormal hypodense attenuation of the lower  pole cortex (series 900 image 50). There is resultant moderate  left-sided hydroureter nephrosis from mass effect. There appears to be  mild-to-moderate regions of narrowing of the left renal vein which  remains patent. The left renal artery passes through the mass without  significant narrowing. The atherosclerotic abdominal aorta appears  patent without significant narrowing. There is mild delayed enhancement  and excretion of the left kidney. There are a few mildly enlarged lymph  nodes more inferiorly along the iliac chains.     The lung bases and lower thorax are unremarkable with the exception of  mild right lung base atelectasis. The gallbladder is not visualized,  possibly surgically absent or decompressed. Normal appearance of the  spleen without splenomegaly. The adrenal glands  are unremarkable.  Colonic diverticulosis without diverticulitis. No bowel obstruction.  Unremarkable appearance of the uterus. The ovaries are not  well-visualized. No free intra-abdominal fluid or conspicuous mesenteric  fat stranding. No pneumoperitoneum. No acute osseous findings. Chronic  moderate superior endplate compression deformity with cement  augmentation of L3.       Impression:         Redemonstration of a large lobulated retroperitoneal soft tissue mass  measuring 5.4 x 10.6 x 14.8 cm, suspicious for lymphadenopathy/mich  conglomerate, compatible with lymphomatous process. The left margin of  this conglomerate extends to the inferior pole the left kidney where  there is contiguous abnormal soft tissue in attenuation into the renal  cortex. It is unclear whether this represents lymphomatous  extension/involvement into the kidney, or whether this reflects renal  lymphoma with retroperitoneal extension. There is moderate left-sided  hydronephrosis from mass effect, as well as mild-to-moderate regions of  narrowing of the left renal vein. There is mild delayed enhancement and  excretion of the left kidney.        This report was finalized on 11/18/2021 2:50 PM by Satnam Valero MD.       CT Chest Without Contrast Diagnostic [[id number]] Collected: 11/18/21 1141     Updated: 11/18/21 1200    Narrative:      EXAMINATION: CT CHEST WO CONTRAST DIAGNOSTIC-, CT ABDOMEN PELVIS WO  CONTRAST-      INDICATION: general weakness.  Cough.     TECHNIQUE: CT of the chest abdomen and pelvis without contrast.     COMPARISON: NONE     FINDINGS:      Heterogeneous attenuation of the thyroid gland with likely small  nodules. No bulky mediastinal or hilar adenopathy. Normal size of the  heart without evidence of pericardial effusion. Coronary artery  calcifications are seen. Mild atherosclerosis of the thoracic aorta  which appears normal in caliber. The thoracic esophagus and remaining  mediastinal structures are  unremarkable. The trachea and bronchi are  patent. There is mild-to-moderate centrilobular upper lobe predominant  emphysematous change throughout the lungs. Mild streaky atelectasis or  scarring at the lung bases. No focal consolidation. No pulmonary mass or  suspicious pulmonary nodule. No pleural effusion or pneumothorax.     There is large bulky retroperitoneal and mesenteric lymphadenopathy with  large retroperitoneal mich conglomerate measuring up to 6.9 x 10.5 cm  in transverse dimension and 14.0 cm in craniocaudal length. There is  associated moderate hydronephrosis of the left kidney (series 301 image  54). The right kidney is unremarkable.     The liver is unremarkable. The gallbladder is not well seen, likely  decompressed. Normal appearance of the spleen without splenomegaly.  Unremarkable appearance of the pancreas. The adrenal glands appear  normal. No bowel obstruction. There is colonic diverticulosis without  diverticulitis. No free intra-abdominal fluid or pneumoperitoneum.  Unremarkable appearance of the uterus. The ovaries are not  well-visualized. No acute osseous findings. Chronic appearing moderate  superior endplate compression deformity of L3 with cement augmentation.       Impression:         There is a large lobular retroperitoneal soft tissue mass measuring 6.9  x 10.5 x 14.0 cm, suboptimally assessed on this noncontrast exam. This  abuts the inferior pole the left kidney. Overall this finding is  concerning for large lymphadenopathy/mich conglomerate. This results in  moderate left-sided hydroureteronephrosis. Contrast-enhanced CT may help  further characterize and delineate this mass.     No acute thoracic findings. Mild-to-moderate centrilobular emphysema.        This report was finalized on 11/18/2021 11:57 AM by Satnam Valero MD.       CT Abdomen Pelvis Without Contrast [[id number]] Collected: 11/18/21 1141     Updated: 11/18/21 1200    Narrative:      EXAMINATION: CT CHEST WO  CONTRAST DIAGNOSTIC-, CT ABDOMEN PELVIS WO  CONTRAST-      INDICATION: general weakness.  Cough.     TECHNIQUE: CT of the chest abdomen and pelvis without contrast.     COMPARISON: NONE     FINDINGS:      Heterogeneous attenuation of the thyroid gland with likely small  nodules. No bulky mediastinal or hilar adenopathy. Normal size of the  heart without evidence of pericardial effusion. Coronary artery  calcifications are seen. Mild atherosclerosis of the thoracic aorta  which appears normal in caliber. The thoracic esophagus and remaining  mediastinal structures are unremarkable. The trachea and bronchi are  patent. There is mild-to-moderate centrilobular upper lobe predominant  emphysematous change throughout the lungs. Mild streaky atelectasis or  scarring at the lung bases. No focal consolidation. No pulmonary mass or  suspicious pulmonary nodule. No pleural effusion or pneumothorax.     There is large bulky retroperitoneal and mesenteric lymphadenopathy with  large retroperitoneal mich conglomerate measuring up to 6.9 x 10.5 cm  in transverse dimension and 14.0 cm in craniocaudal length. There is  associated moderate hydronephrosis of the left kidney (series 301 image  54). The right kidney is unremarkable.     The liver is unremarkable. The gallbladder is not well seen, likely  decompressed. Normal appearance of the spleen without splenomegaly.  Unremarkable appearance of the pancreas. The adrenal glands appear  normal. No bowel obstruction. There is colonic diverticulosis without  diverticulitis. No free intra-abdominal fluid or pneumoperitoneum.  Unremarkable appearance of the uterus. The ovaries are not  well-visualized. No acute osseous findings. Chronic appearing moderate  superior endplate compression deformity of L3 with cement augmentation.       Impression:         There is a large lobular retroperitoneal soft tissue mass measuring 6.9  x 10.5 x 14.0 cm, suboptimally assessed on this noncontrast  exam. This  abuts the inferior pole the left kidney. Overall this finding is  concerning for large lymphadenopathy/mich conglomerate. This results in  moderate left-sided hydroureteronephrosis. Contrast-enhanced CT may help  further characterize and delineate this mass.     No acute thoracic findings. Mild-to-moderate centrilobular emphysema.        This report was finalized on 11/18/2021 11:57 AM by Satnam Valero MD.       CT Head Without Contrast [[id number]] Collected: 11/18/21 1139     Updated: 11/18/21 1157    Narrative:      EXAMINATION: CT HEAD WO CONTRAST-      INDICATION: Generalized weakness.  Previous CVA.     TECHNIQUE: Noncontrast head CT     COMPARISON: NONE     FINDINGS:      No acute intracranial hemorrhage. No acute large territory infarct.  Minimal subcortical and periventricular white matter hypodensities,  nonspecific finding which can be seen in setting of chronic small vessel  ischemic change. No extra-axial collections. Normal size and  configuration of the ventricles. No midline shift or herniation.  Unremarkable appearance of the orbits. No acute osseous findings. The  mastoid air cells and paranasal sinuses are clear.       Impression:         No acute intracranial findings.        This report was finalized on 11/18/2021 11:41 AM by Satnam Valero MD.              I reviewed the patient's new clinical results.      Assessment and Recommendations  Retroperitoneal mass  Hydronephrosis    At this point I concur with the radiologist that this is likely a primary lymphomatous type process.  Because of this I would recommend biopsy of the retroperitoneal mass.  I discussed this briefly with interventional radiology who would rather perform this as an outpatient.  I think it would be wise to go ahead and involve oncology.  At this point we will hold off on placing ureteral stent since she has normal kidney function and no significant flank pain.  Further recommendations to follow  retroperitoneal mass biopsy.      Renal mass    Serum albumin decreased    Dehydration    Unexplained night sweats    Anemia    Inadequate oral nutritional intake    Psoriasis      I discussed the patients findings and my recommendations with patient    Ryne Sánchez Jr., MD  11/18/21  19:19 EST

## 2021-11-19 NOTE — PROGRESS NOTES
Malnutrition Severity Assessment    Patient Name:  Vale Cee  YOB: 1945  MRN: [id number]  Admit Date:  11/18/2021    Patient meets criteria for : Severe Malnutrition    Malnutrition Severity Assessment  Malnutrition Type: Acute Disease or Injury - Related Malnutrition  Malnutrition Type (last 8 hours)     Malnutrition Severity Assessment     Row Name 11/19/21 1215       Malnutrition Severity Assessment    Malnutrition Type Acute Disease or Injury - Related Malnutrition    Row Name 11/19/21 1215       Insufficient Energy Intake     Insufficient Energy Intake Findings Severe    Insufficient Energy Intake  <75% of est. energy requirement for > or equal to 3 months    Row Name 11/19/21 1215       Unintentional Weight Loss     Unintentional Weight Loss Findings Severe    Unintentional Weight Loss  Weight loss greater than 7.5% in three months  10%/3 months    Row Name 11/19/21 1215       Criteria Met (Must meet criteria for severity in at least 2 of these categories: M Wasting, Fat Loss, Fluid, Secondary Signs, Wt. Status, Intake)    Patient meets criteria for  Severe Malnutrition                Electronically signed by:  Merline Sherwood MS RD/[facility]  11/19/21 12:16 EST

## 2021-11-19 NOTE — CONSULTS
Clinical Nutrition   Reason For Visit: Identified at risk by screening criteria, MST score 2+, Malnutrition Severity Assessment, Physician consult, Unintentional weight loss, Reduced oral intake    Patient Name: Vale Cee  YOB: 1945  MRN: [id number]  Date of Encounter: 11/19/21 12:07 EST  Admission date: 11/18/2021      Nutrition Assessment     Admission Problem List:    Renal mass    Serum albumin decreased    Dehydration    Unexplained night sweats    Anemia    Inadequate oral nutritional intake    Psoriasis    Retroperitoneal mass    Hydroureter, left    Hydronephrosis, left    Nutcracker phenomenon of renal vein       Applicable PMH:  HTN  HL  Skin cancer  Psoriasis  Kidney stones      Reported/Observed/Food/Nutrition Related History     11/19) Pt sitting up in bed, eating lunch at time of RD visit, pt's son at the bed side. Pt reports poor appetite for the past 3 months. States that ever since she had a stroke ~1.5 years ago that she now has a metallic taste in her mouth and now all foods taste bad. Reports that she also does not get hungry. Son states that at home pt has been eating daily- half of a grape fruit and a couple crackers. Pt states that she has been drinking Powerade at home. Reports that she does not have any food allergies, no issues chewing/swallowing. States that she has not tried any oral nutrition supplements, but open to trying chocolate or strawberry flavored supplements. Reports that she does not have any food preferences at this time.       Anthropometrics   Height: 65 in  Weight: 140 lb per stated weight (11/18)  BMI: 23.3  BMI classification: Normal: 18.5-24.9kg/m2     UBW: ~155 lb per pt  Weight change: Pt reports that she thinks she has lost ~20 lb unintentionally over the past 3 months. States that prior to weight loss she weighed 155 lb. Reports weighed 141 lb at outpatient appointment 1 week ago. Based on subjective data, pt has lost ~15 lb/10% over the past 3  months.     Date Weight (kg) Weight (lbs) Weight Method   11/18/2021 63.504 kg 140 lb Stated   11/9/2021= 141 lb per care everywhere      Nutrition Focused Physical Exam     Unable to perform exam due to: Patient eating lunch    Labs reviewed   Labs reviewed: Yes    Results from last 7 days   Lab Units 11/19/21  0849 11/18/21  2107 11/18/21  0929   SODIUM mmol/L 139  --  137   POTASSIUM mmol/L 3.2*  --  3.8   CHLORIDE mmol/L 105  --  100   CO2 mmol/L 25.0  --  26.0   BUN mg/dL 10  --  14   CREATININE mg/dL 0.78  --  0.82   GLUCOSE mg/dL 119*  --  105*   CALCIUM mg/dL 8.4*  --  8.9   PHOSPHORUS mg/dL  --  2.9  --      Results from last 7 days   Lab Units 11/19/21  0849 11/18/21  0929   WBC 10*3/mm3 3.44 4.19   ALBUMIN g/dL  --  3.20*         No results found for: HGBA1C  Medications reviewed   Medications reviewed: Yes  Scheduled: antibiotic    Current Nutrition Prescription   PO: Diet Regular  Oral Nutrition Supplement: No active supplement orders     Average PO intake: 11/19) 25% x1 meal      Nutrition Diagnosis   11/19  Problem Inadequate oral intake   Etiology Poor appetite   Signs/Symptoms Pt reports poor appetite/intake x3 months, tastes changes x1.5 years, weight loss       11/19  Problem Malnutrition - severe, acute   Etiology Inadequate oral intake   Signs/Symptoms <50% PO intake; 10% wt loss/3 months       Goal:   General: Nutrition support treatment  PO: Establish PO       Intervention   Intervention: Follow treatment progress, Care plan reviewed, Interview for preferences, Encourage intake, Supplement provided   -Send strawberry Boost Plus 3x/day      Monitoring/Evaluation:   Monitoring/Evaluation: Per protocol, PO intake, Supplement intake, Pertinent labs, Weight, Symptoms    Merline Sherwood, MS RD/LD CNSC  Time Spent: 30 minutes

## 2021-11-19 NOTE — PLAN OF CARE
Goal Outcome Evaluation:  Plan of Care Reviewed With: patient        Progress: no change  Outcome Summary: Patient has had a decent shift, sleeping on and off tonight. VSS, and patient has been alert and oriented times four. No complaints of pain tonight. Nursing staff will continue to monitor and assess the patient.

## 2021-11-21 LAB
QT INTERVAL: 400 MS
QTC INTERVAL: 434 MS

## 2021-11-23 DIAGNOSIS — R19.00 RETROPERITONEAL MASS: Primary | ICD-10-CM

## 2021-11-24 ENCOUNTER — HOSPITAL ENCOUNTER (OUTPATIENT)
Dept: INTERVENTIONAL RADIOLOGY/VASCULAR | Facility: HOSPITAL | Age: 76
Discharge: HOME | End: 2021-11-24

## 2021-11-24 ENCOUNTER — HOSPITAL ENCOUNTER (OUTPATIENT)
Dept: CT IMAGING | Facility: HOSPITAL | Age: 76
Discharge: HOME | End: 2021-11-24

## 2021-11-24 VITALS
BODY MASS INDEX: 22.06 KG/M2 | SYSTOLIC BLOOD PRESSURE: 101 MMHG | WEIGHT: 132.4 LBS | DIASTOLIC BLOOD PRESSURE: 46 MMHG | OXYGEN SATURATION: 99 % | RESPIRATION RATE: 16 BRPM | HEART RATE: 79 BPM | TEMPERATURE: 98 F | HEIGHT: 65 IN

## 2021-11-24 VITALS
DIASTOLIC BLOOD PRESSURE: 46 MMHG | OXYGEN SATURATION: 100 % | HEART RATE: 62 BPM | RESPIRATION RATE: 16 BRPM | SYSTOLIC BLOOD PRESSURE: 97 MMHG

## 2021-11-24 DIAGNOSIS — R19.00 RETROPERITONEAL MASS: ICD-10-CM

## 2021-11-24 PROCEDURE — 88342 IMHCHEM/IMCYTCHM 1ST ANTB: CPT | Performed by: INTERNAL MEDICINE

## 2021-11-24 PROCEDURE — 88365 INSITU HYBRIDIZATION (FISH): CPT | Performed by: INTERNAL MEDICINE

## 2021-11-24 PROCEDURE — 88341 IMHCHEM/IMCYTCHM EA ADD ANTB: CPT | Performed by: INTERNAL MEDICINE

## 2021-11-24 PROCEDURE — 88305 TISSUE EXAM BY PATHOLOGIST: CPT | Performed by: INTERNAL MEDICINE

## 2021-11-24 PROCEDURE — 25010000002 MIDAZOLAM PER 1 MG: Performed by: RADIOLOGY

## 2021-11-24 PROCEDURE — 76937 US GUIDE VASCULAR ACCESS: CPT

## 2021-11-24 PROCEDURE — 88364 INSITU HYBRIDIZATION (FISH): CPT | Performed by: INTERNAL MEDICINE

## 2021-11-24 PROCEDURE — 77001 FLUOROGUIDE FOR VEIN DEVICE: CPT

## 2021-11-24 PROCEDURE — 88360 TUMOR IMMUNOHISTOCHEM/MANUAL: CPT | Performed by: INTERNAL MEDICINE

## 2021-11-24 PROCEDURE — 49180 BIOPSY ABDOMINAL MASS: CPT

## 2021-11-24 PROCEDURE — 25010000002 HEPARIN LOCK FLUSH PER 10 UNITS

## 2021-11-24 PROCEDURE — 99153 MOD SED SAME PHYS/QHP EA: CPT

## 2021-11-24 PROCEDURE — 0 LIDOCAINE 1 % SOLUTION: Performed by: RADIOLOGY

## 2021-11-24 PROCEDURE — 77012 CT SCAN FOR NEEDLE BIOPSY: CPT

## 2021-11-24 PROCEDURE — 88311 DECALCIFY TISSUE: CPT | Performed by: INTERNAL MEDICINE

## 2021-11-24 PROCEDURE — C1894 INTRO/SHEATH, NON-LASER: HCPCS

## 2021-11-24 PROCEDURE — 99152 MOD SED SAME PHYS/QHP 5/>YRS: CPT

## 2021-11-24 PROCEDURE — 88313 SPECIAL STAINS GROUP 2: CPT | Performed by: INTERNAL MEDICINE

## 2021-11-24 PROCEDURE — 85097 BONE MARROW INTERPRETATION: CPT | Performed by: INTERNAL MEDICINE

## 2021-11-24 RX ORDER — LIDOCAINE HYDROCHLORIDE 10 MG/ML
INJECTION, SOLUTION EPIDURAL; INFILTRATION; INTRACAUDAL; PERINEURAL
Status: COMPLETED
Start: 2021-11-24 | End: 2021-11-24

## 2021-11-24 RX ORDER — SODIUM CHLORIDE 0.9 % (FLUSH) 0.9 %
3 SYRINGE (ML) INJECTION EVERY 12 HOURS SCHEDULED
Status: DISCONTINUED | OUTPATIENT
Start: 2021-11-24 | End: 2021-11-25 | Stop reason: HOSPADM

## 2021-11-24 RX ORDER — HEPARIN SODIUM (PORCINE) LOCK FLUSH IV SOLN 100 UNIT/ML 100 UNIT/ML
SOLUTION INTRAVENOUS
Status: COMPLETED
Start: 2021-11-24 | End: 2021-11-24

## 2021-11-24 RX ORDER — LIDOCAINE HYDROCHLORIDE 10 MG/ML
10 INJECTION, SOLUTION INFILTRATION; PERINEURAL ONCE
Status: COMPLETED | OUTPATIENT
Start: 2021-11-24 | End: 2021-11-24

## 2021-11-24 RX ORDER — MIDAZOLAM HYDROCHLORIDE 1 MG/ML
INJECTION INTRAMUSCULAR; INTRAVENOUS
Status: COMPLETED | OUTPATIENT
Start: 2021-11-24 | End: 2021-11-24

## 2021-11-24 RX ORDER — LIDOCAINE HYDROCHLORIDE AND EPINEPHRINE 10; 10 MG/ML; UG/ML
INJECTION, SOLUTION INFILTRATION; PERINEURAL
Status: COMPLETED
Start: 2021-11-24 | End: 2021-11-24

## 2021-11-24 RX ORDER — FENTANYL CITRATE 50 UG/ML
INJECTION, SOLUTION INTRAMUSCULAR; INTRAVENOUS
Status: DISCONTINUED
Start: 2021-11-24 | End: 2021-11-24 | Stop reason: WASHOUT

## 2021-11-24 RX ORDER — SODIUM CHLORIDE 0.9 % (FLUSH) 0.9 %
10 SYRINGE (ML) INJECTION AS NEEDED
Status: DISCONTINUED | OUTPATIENT
Start: 2021-11-24 | End: 2021-11-25 | Stop reason: HOSPADM

## 2021-11-24 RX ORDER — MIDAZOLAM HYDROCHLORIDE 1 MG/ML
INJECTION INTRAMUSCULAR; INTRAVENOUS
Status: DISPENSED
Start: 2021-11-24 | End: 2021-11-24

## 2021-11-24 RX ORDER — HYDROCODONE BITARTRATE AND ACETAMINOPHEN 5; 325 MG/1; MG/1
1 TABLET ORAL EVERY 4 HOURS PRN
Status: DISCONTINUED | OUTPATIENT
Start: 2021-11-24 | End: 2021-11-25 | Stop reason: HOSPADM

## 2021-11-24 RX ADMIN — LIDOCAINE HYDROCHLORIDE 2 ML: 10 INJECTION, SOLUTION EPIDURAL; INFILTRATION; INTRACAUDAL; PERINEURAL at 10:46

## 2021-11-24 RX ADMIN — MIDAZOLAM HYDROCHLORIDE 0.5 MG: 1 INJECTION, SOLUTION INTRAMUSCULAR; INTRAVENOUS at 09:22

## 2021-11-24 RX ADMIN — LIDOCAINE HYDROCHLORIDE AND EPINEPHRINE 8 ML: 10; 10 INJECTION, SOLUTION INFILTRATION; PERINEURAL at 10:47

## 2021-11-24 RX ADMIN — HEPARIN 300 UNITS: 100 SYRINGE at 10:45

## 2021-11-24 RX ADMIN — MIDAZOLAM HYDROCHLORIDE 0.5 MG: 1 INJECTION, SOLUTION INTRAMUSCULAR; INTRAVENOUS at 10:15

## 2021-11-24 RX ADMIN — LIDOCAINE HYDROCHLORIDE 10 ML: 10 INJECTION, SOLUTION INFILTRATION; PERINEURAL at 09:51

## 2021-11-24 NOTE — PRE-PROCEDURE NOTE
"[facility]   Vascular Interventional Radiology  History & Physicial    Patient Name:Vale Cee    : 1945  MRN: [id number]    Primary Care Physician: Sarah Redman APRN    Referring Physician: Adelaida Al MD     Date of admission: 2021    Subjective     Reason for Consult: 1. BM BX. 2. RP LN mass bx. 3. Port-a-cath placement.    History of Present Illness   Vale Cee is a 76 y.o. female referred to IR as noted above.      Active Hospital Problems:  There are no active hospital problems to display for this patient.      Personal History     Past Medical History:   Diagnosis Date   • CVA (cerebral vascular accident) (HCC)    • Fatigue    • Hypertension    • Weight loss, abnormal     30 lbs last 3 months       Past Surgical History:   Procedure Laterality Date   • CARDIAC SURGERY      loop recorder after stroke   • CATARACT EXTRACTION         Family History: Her family history is not on file.     Social History: She  reports that she has quit smoking. She does not have any smokeless tobacco history on file. She reports that she does not drink alcohol and does not use drugs.    Home Medications:  Apremilast, acetaminophen, aspirin, cholecalciferol, metoprolol succinate XL, mirtazapine, and rosuvastatin    Current Medications:  •  fentaNYL citrate (PF)  •  midazolam  •  sodium chloride  •  sodium chloride     Allergies:  She is allergic to codeine and penicillins.    Review of Systems    Objective     Visit Vitals  /64   Pulse 75   Temp 98 °F (36.7 °C)   Resp 18   Ht 165.1 cm (65\")   Wt 60.1 kg (132 lb 6.4 oz)   SpO2 95%   BMI 22.03 kg/m²        Physical Exam    A&Ox3. Able to communicate  No Apparent Distress  Average physique    Result Review      I have personally reviewed the results from the time of this admission to 2021 08:49 EST and agree with these findings.  [x]  Laboratory  []  Microbiology  [x]  Radiology  []  EKG/Telemetry   []  Cardiology/Vascular   []  Pathology  [] "  Old records  []  Other:    Most notable findings include: As noted:    Results from last 7 days   Lab Units 11/19/21  0849 11/18/21  0929   INR  1.27*  --    HEMOGLOBIN g/dL 8.5* 9.5*   HEMATOCRIT % 26.7* 29.7*   PLATELETS 10*3/mm3 202 202       Estimated Creatinine Clearance: 55 mL/min (by C-G formula based on SCr of 0.78 mg/dL). No results found for: CREATININE    Assessment / Plan     Vale Cee is a 76 y.o. female referred to the IR service with above problem.    Plan:   As above.    Krishan Rogel MD   Vascular Interventional Radiology  11/24/21   8:49 AM EST

## 2021-11-24 NOTE — NURSING NOTE
Image guided retroperitoneal mass biopsy performed by MD Rogel. 4 cores obtained, prepped, and labeled. Patient sedated for a total of 22 minutes, and o.5m Versed (total) given. Patient tolerated well. VSS. Report given to LORRAINE.

## 2021-11-24 NOTE — NURSING NOTE
Image guided bone marrow biopsy performed by MD Rogel. Samples obtained, prepped, and labeled. Patient tolerated well with 0.5mg Versed. Juan F to perform retroperitoneal biopsy following bone marrow biopsy.

## 2021-11-24 NOTE — DISCHARGE INSTRUCTIONS
Light activity.  Remove dressings tomorrow.   May Shower tomorrow.   Regular diet.   No changes to medications. Take as previously prescribed by physician

## 2021-11-24 NOTE — POST-PROCEDURE NOTE
Vascular Interventional Radiology  Procedure Note    Date: 09/07/21      Time: 11:54 EDT     Pre-op Diagnosis: BM BX     Post-op Diagnosis: BM BX    Procedure: CT guided BM BX. Via PSIS.    Volume removed: 20 cc of BM aspirate.    Surgeon: Krishan Rogel MD     Assistants: FROY    Sedation: IV Midazolam. See records for details.    Estimated Blood Loss (EBL): 0 cc    IVF: NA    Findings: Above    Specimens: Aspirate and Bone core    Complications: NA    Disposition: IR recovery.           Vascular Interventional Radiology  Procedure Note    Date: 11/24/21       Time: 09:53 EST     Pre-op Diagnosis: CT guided Bx. RP soft tissue.     Post-op Diagnosis: As above.     Procedure: Above. Via Post access.    Specimen: 4 cores. Cytology. Sent to the lab.    Surgeon: Krishan Rogel MD     Assistants: NA    Sedation: IV Midazolam. See records for details.    Estimated Blood Loss (EBL): 0 cc    IVF: NA    Findings: Above    Complications: NA    Disposition: IR Recovery.          Vascular Interventional Radiology  Procedure Note    Date: 11/24/21     Time: 9:53 AM EST     Pre-op Diagnosis: Need for HD long term IV access for chemotherapy.  Post-op Diagnosis: same    Procedure: Conscious sedation. US and fluoroscopic guided RIJ route port-a-cath placement    Surgeon: Krishan Rogel MD     Sedation: Conscious sedation. See report for details.    Estimated Blood Loss (EBL): min     IVF: NA    Findings: Catheter tip in the svc/ra junction. Ok to use.    Specimens: none    Complications: None immediate    Disposition: IR recovery    Krishan Rogel MD   Vascular Interventional Radiology    11/24/21   9:53 AM EST

## 2021-11-24 NOTE — NURSING NOTE
Pt brought from CT 3 from other procedure for port placement in IR. Single lumen port placed to Right IJ per Dr. Krishan Rogel. 0.5mg Versed given for a sedation time of 22 minutes. Pt tolerated well. Report called to nurse.

## 2021-11-26 ENCOUNTER — TELEPHONE (OUTPATIENT)
Dept: INFUSION THERAPY | Facility: HOSPITAL | Age: 76
End: 2021-11-26

## 2021-11-29 DIAGNOSIS — R11.2 INTRACTABLE VOMITING WITH NAUSEA, UNSPECIFIED VOMITING TYPE: ICD-10-CM

## 2021-11-29 DIAGNOSIS — R19.00 RETROPERITONEAL MASS: Primary | ICD-10-CM

## 2021-11-29 RX ORDER — PREDNISONE 20 MG/1
TABLET ORAL
Qty: 12 TABLET | Refills: 0 | Status: SHIPPED | OUTPATIENT
Start: 2021-11-29 | End: 2022-02-19 | Stop reason: HOSPADM

## 2021-11-29 RX ORDER — PREDNISONE 20 MG/1
TABLET ORAL
Qty: 12 TABLET | Refills: 0 | Status: CANCELLED | OUTPATIENT
Start: 2021-11-29

## 2021-11-29 RX ORDER — ONDANSETRON 8 MG/1
8 TABLET, ORALLY DISINTEGRATING ORAL EVERY 8 HOURS PRN
Qty: 30 TABLET | Refills: 1 | Status: SHIPPED | OUTPATIENT
Start: 2021-11-29 | End: 2022-02-16

## 2021-11-29 RX ORDER — ALLOPURINOL 300 MG/1
300 TABLET ORAL DAILY
Qty: 30 TABLET | Refills: 1 | Status: SHIPPED | OUTPATIENT
Start: 2021-11-29 | End: 2022-02-01 | Stop reason: SDUPTHER

## 2021-11-29 RX ORDER — ALLOPURINOL 300 MG/1
300 TABLET ORAL DAILY
Qty: 30 TABLET | Refills: 1 | Status: CANCELLED | OUTPATIENT
Start: 2021-11-29

## 2021-11-29 RX ORDER — ONDANSETRON 8 MG/1
8 TABLET, ORALLY DISINTEGRATING ORAL EVERY 8 HOURS PRN
Qty: 30 TABLET | Refills: 2 | Status: CANCELLED | OUTPATIENT
Start: 2021-11-29

## 2021-11-29 NOTE — TELEPHONE ENCOUNTER
Called to reach patient but reached her son Nathan who advised they had spoke with Dr. Carrion on call this weekend.  And he had recommended she go to the ER. I called to check on her and patients son advised that she is still laying around, not eating and having dry heaves again.  He said she does not have any zofran, etc.  I discussed with Dr. Al who advised we would send in prednisone 60mg daily with food for 2 days and taper by 20 mg every 2 days starting tomorrow after pet scan. Also sending in allopurinol 300mg daily as well as zofran 8mg every 8 hours as needed for nausea.

## 2021-11-30 ENCOUNTER — HOSPITAL ENCOUNTER (OUTPATIENT)
Dept: PET IMAGING | Facility: HOSPITAL | Age: 76
Discharge: HOME | End: 2021-11-30

## 2021-11-30 DIAGNOSIS — R19.00 RETROPERITONEAL MASS: ICD-10-CM

## 2021-11-30 DIAGNOSIS — R93.5 ABNORMAL FINDINGS ON DIAGNOSTIC IMAGING OF OTHER ABDOMINAL REGIONS, INCLUDING RETROPERITONEUM: ICD-10-CM

## 2021-11-30 LAB — GLUCOSE BLDC GLUCOMTR-MCNC: 105 MG/DL (ref 70–130)

## 2021-11-30 PROCEDURE — A9552 F18 FDG: HCPCS | Performed by: INTERNAL MEDICINE

## 2021-11-30 PROCEDURE — 82962 GLUCOSE BLOOD TEST: CPT

## 2021-11-30 PROCEDURE — 78815 PET IMAGE W/CT SKULL-THIGH: CPT

## 2021-11-30 PROCEDURE — 0 FLUDEOXYGLUCOSE F18 SOLUTION: Performed by: INTERNAL MEDICINE

## 2021-11-30 RX ADMIN — FLUDEOXYGLUCOSE F18 1 DOSE: 300 INJECTION INTRAVENOUS at 08:49

## 2021-12-01 DIAGNOSIS — C83.33 DIFFUSE LARGE B-CELL LYMPHOMA OF INTRA-ABDOMINAL LYMPH NODES (HCC): Primary | ICD-10-CM

## 2021-12-01 RX ORDER — DIPHENHYDRAMINE HYDROCHLORIDE 50 MG/ML
50 INJECTION INTRAMUSCULAR; INTRAVENOUS AS NEEDED
Status: CANCELLED | OUTPATIENT
Start: 2021-12-27

## 2021-12-01 RX ORDER — PALONOSETRON 0.05 MG/ML
0.25 INJECTION, SOLUTION INTRAVENOUS ONCE
Status: CANCELLED | OUTPATIENT
Start: 2021-12-27

## 2021-12-01 RX ORDER — FAMOTIDINE 10 MG/ML
20 INJECTION, SOLUTION INTRAVENOUS AS NEEDED
Status: CANCELLED | OUTPATIENT
Start: 2021-12-27

## 2021-12-01 RX ORDER — MEPERIDINE HYDROCHLORIDE 25 MG/ML
25 INJECTION INTRAMUSCULAR; INTRAVENOUS; SUBCUTANEOUS
Status: CANCELLED | OUTPATIENT
Start: 2021-12-03

## 2021-12-01 RX ORDER — DOXORUBICIN HYDROCHLORIDE 2 MG/ML
50 INJECTION, SOLUTION INTRAVENOUS ONCE
Status: CANCELLED | OUTPATIENT
Start: 2021-12-03

## 2021-12-01 RX ORDER — OLANZAPINE 5 MG/1
5 TABLET ORAL ONCE
Status: CANCELLED | OUTPATIENT
Start: 2021-12-03

## 2021-12-01 RX ORDER — SODIUM CHLORIDE 9 MG/ML
250 INJECTION, SOLUTION INTRAVENOUS ONCE
Status: CANCELLED | OUTPATIENT
Start: 2021-12-03

## 2021-12-01 RX ORDER — ONDANSETRON HYDROCHLORIDE 8 MG/1
8 TABLET, FILM COATED ORAL 3 TIMES DAILY PRN
Qty: 30 TABLET | Refills: 5 | Status: SHIPPED | OUTPATIENT
Start: 2021-12-01

## 2021-12-01 RX ORDER — ACETAMINOPHEN 325 MG/1
650 TABLET ORAL ONCE
Status: CANCELLED | OUTPATIENT
Start: 2021-12-27

## 2021-12-01 RX ORDER — PREDNISONE 50 MG/1
100 TABLET ORAL DAILY
Qty: 10 TABLET | Refills: 5 | Status: SHIPPED | OUTPATIENT
Start: 2021-12-01 | End: 2021-12-06

## 2021-12-01 RX ORDER — MEPERIDINE HYDROCHLORIDE 25 MG/ML
25 INJECTION INTRAMUSCULAR; INTRAVENOUS; SUBCUTANEOUS
Status: CANCELLED | OUTPATIENT
Start: 2021-12-27

## 2021-12-01 RX ORDER — SODIUM CHLORIDE 9 MG/ML
250 INJECTION, SOLUTION INTRAVENOUS ONCE
Status: CANCELLED | OUTPATIENT
Start: 2021-12-27

## 2021-12-01 RX ORDER — DIPHENHYDRAMINE HYDROCHLORIDE 50 MG/ML
50 INJECTION INTRAMUSCULAR; INTRAVENOUS AS NEEDED
Status: CANCELLED | OUTPATIENT
Start: 2021-12-03

## 2021-12-01 RX ORDER — DOXORUBICIN HYDROCHLORIDE 2 MG/ML
50 INJECTION, SOLUTION INTRAVENOUS ONCE
Status: CANCELLED | OUTPATIENT
Start: 2021-12-27

## 2021-12-01 RX ORDER — PALONOSETRON 0.05 MG/ML
0.25 INJECTION, SOLUTION INTRAVENOUS ONCE
Status: CANCELLED | OUTPATIENT
Start: 2021-12-03

## 2021-12-01 RX ORDER — OLANZAPINE 5 MG/1
5 TABLET ORAL ONCE
Status: CANCELLED | OUTPATIENT
Start: 2021-12-27

## 2021-12-01 RX ORDER — FAMOTIDINE 10 MG/ML
20 INJECTION, SOLUTION INTRAVENOUS AS NEEDED
Status: CANCELLED | OUTPATIENT
Start: 2021-12-03

## 2021-12-01 RX ORDER — ACETAMINOPHEN 325 MG/1
650 TABLET ORAL ONCE
Status: CANCELLED | OUTPATIENT
Start: 2021-12-03

## 2021-12-02 ENCOUNTER — LAB (OUTPATIENT)
Dept: LAB | Facility: HOSPITAL | Age: 76
End: 2021-12-02

## 2021-12-02 ENCOUNTER — EDUCATION (OUTPATIENT)
Dept: ONCOLOGY | Facility: HOSPITAL | Age: 76
End: 2021-12-02

## 2021-12-02 ENCOUNTER — OFFICE VISIT (OUTPATIENT)
Dept: ONCOLOGY | Facility: CLINIC | Age: 76
End: 2021-12-02

## 2021-12-02 ENCOUNTER — DOCUMENTATION (OUTPATIENT)
Dept: NUTRITION | Facility: HOSPITAL | Age: 76
End: 2021-12-02

## 2021-12-02 ENCOUNTER — HOSPITAL ENCOUNTER (OUTPATIENT)
Dept: ONCOLOGY | Facility: HOSPITAL | Age: 76
Discharge: HOME | End: 2021-12-02

## 2021-12-02 VITALS
HEART RATE: 74 BPM | SYSTOLIC BLOOD PRESSURE: 100 MMHG | TEMPERATURE: 98.4 F | OXYGEN SATURATION: 98 % | HEIGHT: 65 IN | DIASTOLIC BLOOD PRESSURE: 61 MMHG | RESPIRATION RATE: 16 BRPM | BODY MASS INDEX: 21.96 KG/M2 | WEIGHT: 131.8 LBS

## 2021-12-02 DIAGNOSIS — C83.33 DIFFUSE LARGE B-CELL LYMPHOMA OF INTRA-ABDOMINAL LYMPH NODES (HCC): ICD-10-CM

## 2021-12-02 DIAGNOSIS — C83.33 DIFFUSE LARGE B-CELL LYMPHOMA OF INTRA-ABDOMINAL LYMPH NODES (HCC): Primary | ICD-10-CM

## 2021-12-02 LAB
ALBUMIN SERPL-MCNC: 3.2 G/DL (ref 3.5–5.2)
ALBUMIN/GLOB SERPL: 0.8 G/DL
ALP SERPL-CCNC: 76 U/L (ref 39–117)
ALT SERPL W P-5'-P-CCNC: 11 U/L (ref 1–33)
ANION GAP SERPL CALCULATED.3IONS-SCNC: 14 MMOL/L (ref 5–15)
AST SERPL-CCNC: 30 U/L (ref 1–32)
BILIRUB SERPL-MCNC: 0.4 MG/DL (ref 0–1.2)
BUN SERPL-MCNC: 27 MG/DL (ref 8–23)
BUN/CREAT SERPL: 23.3 (ref 7–25)
CALCIUM SPEC-SCNC: 9.9 MG/DL (ref 8.6–10.5)
CHLORIDE SERPL-SCNC: 101 MMOL/L (ref 98–107)
CO2 SERPL-SCNC: 24 MMOL/L (ref 22–29)
CREAT SERPL-MCNC: 1.16 MG/DL (ref 0.57–1)
ERYTHROCYTE [DISTWIDTH] IN BLOOD BY AUTOMATED COUNT: 16.6 % (ref 12.3–15.4)
GFR SERPL CREATININE-BSD FRML MDRD: 45 ML/MIN/1.73
GLOBULIN UR ELPH-MCNC: 4 GM/DL
GLUCOSE SERPL-MCNC: 134 MG/DL (ref 65–99)
HBV SURFACE AG SERPL QL IA: NORMAL
HCT VFR BLD AUTO: 29.5 % (ref 34–46.6)
HGB BLD-MCNC: 9.5 G/DL (ref 12–15.9)
LYMPHOCYTES # BLD AUTO: 0.6 10*3/MM3 (ref 0.7–3.1)
LYMPHOCYTES NFR BLD AUTO: 6.6 % (ref 19.6–45.3)
MCH RBC QN AUTO: 29 PG (ref 26.6–33)
MCHC RBC AUTO-ENTMCNC: 32.1 G/DL (ref 31.5–35.7)
MCV RBC AUTO: 90.2 FL (ref 79–97)
MONOCYTES # BLD AUTO: 0.4 10*3/MM3 (ref 0.1–0.9)
MONOCYTES NFR BLD AUTO: 4.3 % (ref 5–12)
NEUTROPHILS NFR BLD AUTO: 7.7 10*3/MM3 (ref 1.7–7)
NEUTROPHILS NFR BLD AUTO: 89.1 % (ref 42.7–76)
PLATELET # BLD AUTO: 221 10*3/MM3 (ref 140–450)
PMV BLD AUTO: 9.1 FL (ref 6–12)
POTASSIUM SERPL-SCNC: 3.5 MMOL/L (ref 3.5–5.2)
PROT SERPL-MCNC: 7.2 G/DL (ref 6–8.5)
RBC # BLD AUTO: 3.28 10*6/MM3 (ref 3.77–5.28)
SODIUM SERPL-SCNC: 139 MMOL/L (ref 136–145)
WBC NRBC COR # BLD: 8.6 10*3/MM3 (ref 3.4–10.8)

## 2021-12-02 PROCEDURE — 86704 HEP B CORE ANTIBODY TOTAL: CPT

## 2021-12-02 PROCEDURE — 87340 HEPATITIS B SURFACE AG IA: CPT

## 2021-12-02 PROCEDURE — G0463 HOSPITAL OUTPT CLINIC VISIT: HCPCS

## 2021-12-02 PROCEDURE — 36415 COLL VENOUS BLD VENIPUNCTURE: CPT

## 2021-12-02 PROCEDURE — 85025 COMPLETE CBC W/AUTO DIFF WBC: CPT

## 2021-12-02 PROCEDURE — 80053 COMPREHEN METABOLIC PANEL: CPT

## 2021-12-02 PROCEDURE — 86706 HEP B SURFACE ANTIBODY: CPT

## 2021-12-02 PROCEDURE — 99214 OFFICE O/P EST MOD 30 MIN: CPT | Performed by: NURSE PRACTITIONER

## 2021-12-02 PROCEDURE — 84550 ASSAY OF BLOOD/URIC ACID: CPT

## 2021-12-02 RX ORDER — LIDOCAINE AND PRILOCAINE 25; 25 MG/G; MG/G
1 CREAM TOPICAL AS NEEDED
Qty: 30 G | Refills: 3 | Status: SHIPPED | OUTPATIENT
Start: 2021-12-02

## 2021-12-02 NOTE — PLAN OF CARE
Outpatient Infusion • [address] • [address] • [place] [id number] • [phone]      CHEMOTHERAPY EDUCATION SHEET    NAME:  Vale Cee      : 1945           DATE: 21    Booklets Given: Chemotherapy and You []  Eating Hints []    Sexuality/Fertility Books []     Chemotherapy/Biotherapy Education Sheets: (list all that apply)  Rituximab, doxorubicin, cyclophosphamide, vincristine, prednisone, Neulasta Onpro, CINV, diarrhea, constipation                                                                                                                                                                Chemotherapy Regimen: 6 cycles of R-CHOP (21-day cycle) rituximabm, cyclophosphamide, doxorubicin, vincristine on day 1, prednisone on days 1-5 + Neulasta OnPro    TOPICS EDUCATION PROVIDED COMMENTS   ANEMIA:  role of RBC, cause, s/s, ways to manage, role of transfusion [x] Reviewed the role of RBC and the use of transfusions if hemoglobin decreases too much.  Patient to notify us if they experience shortness of breath, dizziness, or palpitations.  Also let patient know they could feel more tired than usual and to try to stay active, but rest if they need to.   THROMBOCYTOPENIA:  role of platelet, cause, s/s, ways to prevent bleeding, things to avoid, when to seek help [x] Reviewed the role of platelets in blood clotting and when to call clinic (bloody nose that bleeds for 5 mins despite pressure, a cut that won't stop bleeding despite pressure, gums that bleed excessively with brushing or flossing). Recommended using an electric razor, soft bristle toothbrush, and blowing your nose gently.   NEUTROPENIA:  role of WBC, cause, infection precautions, s/s of infection, when to call MD [x] Reviewed the role of WBC, good infection prevention practices, and when to call the clinic (temperature 100.4F, sore throat, burning urination, etc)  Vaccines: COVID (no booster), no flu   NUTRITION & APPETITE  CHANGES:  importance of maintaining healthy diet & weight, ways to manage to improve intake, dietary consult, exercise regimen [x] Discussed the potential for loss of appetite from chemotherapy medications, and recommended to eat smaller, more frequent meals throughout the day if having loss of appetite. Also discussed the chance of  increased appetite and heartburn from prednisone. Reinforced the need to stay adequately hydrated by drinking lots of non-caffeinated fluids.  She is already having some tumor lysis but is at risk to have more lysis, this combined with hemorrhagic cystitis seen with cyclophosphamide makes hydration very important.   DIARRHEA:  causes, s/s of dehydration, ways to manage, dietary changes, when to call MD [x] Provided supplementary handout with instructions for use of loperamide and other OTC therapies to manage diarrhea.  Instructed to call us if medications aren't working.   CONSTIPATION:  causes, ways to manage, dietary changes, when to call MD [x] Provided supplementary handout with instructions for the use of docusate, Miralax, and other OTC therapies to manage constipation. Instructed to call us if medications aren't working.    NAUSEA & VOMITING:  cause, use of antiemetics, dietary changes, when to call MD [x] Premeds: olanzapine, palonosetron, fosaprepitant, dexamethasone  PRN meds: ondansetron    Instructed the patient to take a dose of the PRN medication at the first onset of nausea and if it's not working to call us for additional medications.  Also provided non-drug measures to mitigate nausea.   MOUTH SORES:  causes, oral care, ways to manage [x] Mouth sores can be prevented by making a mouth wash mixture of salt, baking soda, and water.  The patient was instructed to swish and spit four times daily after meals and before bedtime.  Use of a soft bristle toothbrush was recommended.  The patient was instructed to avoid OTC mouthwashes due to the alcohol component.   ALOPECIA:   cause, ways to manage, resources [x] Discussed the potential for hair loss or hair thinning with this regimen. Informed patient that she could request a prescription for a wig if desired.    NERVOUS SYSTEM CHANGES:  causes, s/s, neuropathies, cognitive changes, ways to manage [x] Discussed peripheral neuropathy with the patient and associated signs/symptoms. Instructed patient to call if symptoms become more frequent or severe. She has already been experiencing insomnia (on prednisone 60 mg) and her dose with this will be even higher at 100 mg. Reminded her this can also agitation and mood swings.   PAIN:  causes, ways to manage [x] Discussed headaches from prednisone, and other muscle/joint pains from chemotherapy. Recommended OTC pain relief for these symptoms if needed. Also discussed the potential of bone pain from pegfilgrastim in the first 4-5 days after injection, and recommended the use of loratadine or ibuprofen for bone pain control.    SKIN & NAIL CHANGES:  cause, s/s, ways to manage [x] Discussed the potential for nail changes with patient. May develop dark or white lines on finger and toenails, they may become more brittle and even chip or fall off in some cases.    ORGAN TOXICITIES:  cause, s/s, need for diagnostic tests, labs, when to notify MD [x] Discussed cardiotoxicity associated with this regimen, and the need for periodic echocardiograms to assess heart function. Instructed to call if experiencing any new or worsening chest pain or SOB. Discussed the potential for tumor lysis syndrome from chemotherapy, and assured patient we would be monitoring labs to assess organ function at each visit. Discussed hemorrhagic cystitis from cyclophosphamide; instructed patient to drink plenty of water and fluids while receiving treatment, and to call if noticing any blood in the urine. Also discussed the possibility of a secondary malignancy that can develop months or years after treatment completion.    HOME  CARE:  use of spill kits, storing of PO chemo, how to manage bodily fluids [x] To prevent exposure to others, instructed patient to close the toilet lid and flush twice after using the bathroom.    MISCELLANEOUS:  drug interactions, administration, vesicant, et [x] Discussed the red/orange/pink discoloration of urine, tears, and sweat in the first 24-48 hours after receiving doxorubicin. Instructed patient to call if noticing any dark red color in the urine as this could be a sign of hemorrhagic cystitis. Discussed potential for heartburn from prednisone and instructed to take with food for prevention. Also discussed the potential for irritability, insomnia, and fluid retention from prednisone. Discussed the potential for flu-like or cold symptoms in the first few days after receiving rituximab, and recommended OTC treatment options for symptoms if needed. Discussed the possibility of an infusion reaction and associated signs/symptoms (flushing, SOB, dizziness, chills). Instructed patient to alert nursing staff immediately if experiencing any of these symptoms during infusion. Let the patient know she may be sleepy due to the large 50 mg dose of diphenhydramine as a premedication for infusion reactions.  Also mentioned that fluid retention can occur with high dose steroids.  The Neulasta OnPro device was reviewed in detail.     Notes:  Discussed aforementioned material with patient here in clinic. All questions and concerns addressed. Provided patient with the pharmacist's contact information and instructions to call should additional questions arise. Obtained consent today at this visit. Patient was given a personalized monthly treatment calendar of tentative treatment cycles, as well as education sheets over rituximab, cyclophosphamide, doxorubicin, vincristine, prednisone, and Neulasta Onpro. Discussed all of the premedications that the patient would receive before chemotherapy infusion (acetaminophen,  diphenhydramine, olanzapine, fosaprepitant, palonosetron, dexamethasone). Spent time reviewing the Neulasta Onpro Patient Instructions for Use with the patient and her sister to ensure full understanding.     Thank you,  Justin Last  PharmD Candidate 2022 12/2/2021  18:42 EST

## 2021-12-02 NOTE — PROGRESS NOTES
"Outpatient Oncology Nutrition     Reason for Visit:     Oncology Nutrition Screening and Patient Education    Patient Name:  Vale Cee    :  1945    MRN:  [id number]    Date of Encounter: 2021    Nutrition Assessment     Diagnosis:  Normocytic anemia / Bulky lymphadenopathy - large mass next to the left kidney    Chemotherapy: R-CHOP - 6 cycles / 21 day cycle - start date 12/3/21    Patient Active Problem List   Diagnosis   • Renal mass   • Serum albumin decreased   • Dehydration   • Unexplained night sweats   • Anemia   • Inadequate oral nutritional intake   • Psoriasis   • Retroperitoneal mass   • Hydroureter, left   • Hydronephrosis, left   • Nutcracker phenomenon of renal vein   • Severe malnutrition (HCC)   • Vitamin D deficiency   • Coagulopathy (HCC)   • 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)       Food / Nutrition Related History       Hydration Status     Goal:  65 ounces    Enteral Feeding   NA    Anthropometric Measurements     Height:    Ht Readings from Last 1 Encounters:   21 165.1 cm (65\")       Weight:    Wt Readings from Last 1 Encounters:   21 60.1 kg (132 lb 6.4 oz)       BMI:  22.03 / normal    Weight Change: Approximate 23 lbs unintentional weight loss over the past 3 months / 15% weight loss with inadequate intake      Review of Lab Data (Time Frame - 1 month / 2 month)   Lab work 21 reviewed noting low albumin, hyperglycemia, BUN, creatinine      Medication Review   Reviewed    Nutrition Focused Physical Findings       Nutrition Impact Symptoms     Fatigue  Weakness  Weight loss related to lack of appetite and decreased oral food intake    Physical Activity      Not feeling up to most things, but in bed less than half the day    Current Nutritional Intake     Oral diet:  Regular    Oral nutritional supplements:  Trialed when she was inpatient status    Malnutrition Risk Assessment     Recent weight loss over the past 6 months:  " Yes    How much weight loss:  2 = 14-23 lbs    Eating poorly because of a decreased appetite:  1 = Yes    Malnutrition Screening Score:     MST = 2 more Patient at risk for malnutrition     Nutrition Diagnosis     Problem Severe malnutrition in context of acute illness   Etiology Diagnosis   Signs / Symptoms Poor appetite with inadequate oral intake  23 lbs unintentional weight loss past 3 months (15%)  Visual fat and muscle wasting     Nutrition Intervention   Initial consultation with patient during Cycle # 1 infusion.  Patient states that she lives alone, but has a very supportive family that provides for her needs.  She complains of much discomfort in her back, extreme fatigue and weakness.  Her appetite and oral food intake has been poor related to the new diagnosis and evidenced by the approximate 25 lbs + weight loss over the past 3 months.  Reviewed current oral intake which indicates that the amounts of food she eats is very small and inadequate to meet her nutritional needs.  She states that most of the food she eats are foods that her family brings to her from the outside. She also states that she has issues with metallic tastes; encourage patient to use the baking soda, salt and water mouth rinses and discussed the importance of good mouth hygiene in management of how food tastes.    Discussed the importance of nutritional intake with diagnosis, focusing on trying to increase her nutritional intake, role of protein.  Multiple suggestions made for quick easy nutrient dense foods that patient might want to try to boost intake and to make it easy for her to fix.  Discussed possible side effects of treatment and tips for management.      Written material highlighting our conversation provided to patient.  Also provided samples of Ensure and Boost products with encouragement for patient to drink at least 1-2 per day as she is able to supplement oral intake.    Goal       Monitoring / Evaluation     Will  follow.

## 2021-12-02 NOTE — PROGRESS NOTES
DATE OF VISIT: 12/3/2021    REASON FOR VISIT: Followup for diffuse large B-cell lymphoma     HISTORY OF PRESENT ILLNESS: The patient is a very pleasant 76 y.o. female  with past medical history significant for diffuse large B-cell lymphoma diagnosed November 2021.  The patient was started on R-CHOP chemotherapy December 3, 2021. The  patient is here today for scheduled follow-up visit.    SUBJECTIVE: The patient has been doing fairly well. she was able to tolerate  her treatment without any serious side effects. she denied any fever or  chills, no night sweats, denied any headaches    PAST MEDICAL HISTORY/SOCIAL HISTORY/FAMILY HISTORY: Reviewed by me and unchanged from my documentation done on 12/03/21.    Review of Systems   Constitutional: Negative for activity change, appetite change, chills, fatigue, fever and unexpected weight change.   HENT: Negative for hearing loss, mouth sores, nosebleeds, sore throat and trouble swallowing.    Eyes: Negative for visual disturbance.   Respiratory: Negative for cough, chest tightness, shortness of breath and wheezing.    Cardiovascular: Negative for chest pain, palpitations and leg swelling.   Gastrointestinal: Negative for abdominal distention, abdominal pain, blood in stool, constipation, diarrhea, nausea, rectal pain and vomiting.   Endocrine: Negative for cold intolerance and heat intolerance.   Genitourinary: Negative for difficulty urinating, dysuria, frequency and urgency.   Musculoskeletal: Negative for arthralgias, back pain, gait problem, joint swelling and myalgias.   Skin: Negative for rash.   Neurological: Negative for dizziness, tremors, syncope, weakness, light-headedness, numbness and headaches.   Hematological: Negative for adenopathy. Does not bruise/bleed easily.   Psychiatric/Behavioral: Negative for confusion, sleep disturbance and suicidal ideas. The patient is not nervous/anxious.          Current Outpatient Medications:   •  acetaminophen (TYLENOL)  500 MG tablet, Take 1 tablet by mouth Every 6 (Six) Hours As Needed for Mild Pain  or Moderate Pain ., Disp: , Rfl:   •  allopurinol (Zyloprim) 300 MG tablet, Take 1 tablet by mouth Daily., Disp: 30 tablet, Rfl: 1  •  Apremilast (OTEZLA PO), 30 mg., Disp: , Rfl:   •  aspirin (Aspirin 81) 81 MG chewable tablet, Aspir-81, Disp: , Rfl:   •  cholecalciferol (Vitamin D, Cholecalciferol,) 25 MCG (1000 UT) tablet, Take 1 tablet by mouth Daily for 30 days., Disp: 30 tablet, Rfl: 0  •  hydroCHLOROthiazide (HYDRODIURIL) 12.5 MG tablet, , Disp: , Rfl:   •  lidocaine-prilocaine (EMLA) 2.5-2.5 % cream, Apply 1 application topically to the appropriate area as directed As Needed (45-60 minutes prior to port access.  Cover with saran/plastic wrap.)., Disp: 30 g, Rfl: 3  •  metoprolol succinate XL (TOPROL-XL) 25 MG 24 hr tablet, Take 1 tablet by mouth Daily., Disp: , Rfl:   •  Metoprolol-hydroCHLOROthiazide (DUTOPROL PO), metoprolol rooney-hydrochlorothiaz, Disp: , Rfl:   •  mirtazapine (REMERON) 15 MG tablet, Take 1 tablet by mouth Every Night., Disp: 30 tablet, Rfl: 1  •  nitrofurantoin, macrocrystal-monohydrate, (MACROBID) 100 MG capsule, , Disp: , Rfl:   •  ondansetron (ZOFRAN) 8 MG tablet, Take 1 tablet by mouth 3 (Three) Times a Day As Needed for Nausea or Vomiting., Disp: 30 tablet, Rfl: 5  •  ondansetron ODT (Zofran ODT) 8 MG disintegrating tablet, Place 1 tablet on the tongue Every 8 (Eight) Hours As Needed for Nausea or Vomiting., Disp: 30 tablet, Rfl: 1  •  potassium chloride 10 MEQ CR tablet, , Disp: , Rfl:   •  predniSONE (DELTASONE) 20 MG tablet, Take 60 mg daily for 2 days with food.  Taper by 20 mg every 2 days until complete, Disp: 12 tablet, Rfl: 0  •  predniSONE (DELTASONE) 50 MG tablet, Take 2 tablets by mouth Daily for 5 doses. Take with food.  Bring the first dose to chemotherapy appointment., Disp: 10 tablet, Rfl: 5  •  rosuvastatin (CRESTOR) 20 MG tablet, Take 20 mg by mouth., Disp: , Rfl:     PHYSICAL  "EXAMINATION:   /58   Pulse 86   Temp 96.8 °F (36 °C) (Temporal)   Resp 16   Ht 165.1 cm (65\")   Wt 59.4 kg (131 lb)   SpO2 98%   BMI 21.80 kg/m²    Pain Score    12/03/21 0752   PainSc: 0-No pain        ECOG score: 1            ECOG Performance Status: 1 - Symptomatic but completely ambulatory  General Appearance:  alert, cooperative, no apparent distress and appears stated age   Neurologic/Psychiatric: A&O x 3, gait steady, appropriate affect, strength 5/5 in all muscle groups   HEENT:  Normocephalic, without obvious abnormality, mucous membranes moist   Neck: Supple, symmetrical, trachea midline, no adenopathy;  No thyromegaly, masses, or tenderness   Lungs:   Clear to auscultation bilaterally; respirations regular, even, and unlabored bilaterally   Heart:  Regular rate and rhythm, no murmurs appreciated   Abdomen:   Soft, non-tender, non-distended and no organomegaly   Lymph nodes: No cervical, supraclavicular, inguinal or axillary adenopathy noted   Extremities: Normal, atraumatic; no clubbing, cyanosis, or edema    Skin: No rashes, ulcers, or suspicious lesions noted     Lab on 12/02/2021   Component Date Value Ref Range Status   • Hepatitis B Surface Ag 12/02/2021 Non-Reactive  Non-Reactive Final   • Hep B S Ab 12/02/2021 Non-Reactive  Non-Reactive Final   • Glucose 12/02/2021 134* 65 - 99 mg/dL Final   • BUN 12/02/2021 27* 8 - 23 mg/dL Final   • Creatinine 12/02/2021 1.16* 0.57 - 1.00 mg/dL Final   • Sodium 12/02/2021 139  136 - 145 mmol/L Final   • Potassium 12/02/2021 3.5  3.5 - 5.2 mmol/L Final   • Chloride 12/02/2021 101  98 - 107 mmol/L Final   • CO2 12/02/2021 24.0  22.0 - 29.0 mmol/L Final   • Calcium 12/02/2021 9.9  8.6 - 10.5 mg/dL Final   • Total Protein 12/02/2021 7.2  6.0 - 8.5 g/dL Final   • Albumin 12/02/2021 3.20* 3.50 - 5.20 g/dL Final   • ALT (SGPT) 12/02/2021 11  1 - 33 U/L Final   • AST (SGOT) 12/02/2021 30  1 - 32 U/L Final   • Alkaline Phosphatase 12/02/2021 76  39 - 117 U/L " Final   • Total Bilirubin 12/02/2021 0.4  0.0 - 1.2 mg/dL Final   • eGFR Non African Amer 12/02/2021 45* >60 mL/min/1.73 Final   • Globulin 12/02/2021 4.0  gm/dL Final    Calculated Result   • A/G Ratio 12/02/2021 0.8  g/dL Final   • BUN/Creatinine Ratio 12/02/2021 23.3  7.0 - 25.0 Final   • Anion Gap 12/02/2021 14.0  5.0 - 15.0 mmol/L Final   • WBC 12/02/2021 8.60  3.40 - 10.80 10*3/mm3 Final   • RBC 12/02/2021 3.28* 3.77 - 5.28 10*6/mm3 Final   • Hemoglobin 12/02/2021 9.5* 12.0 - 15.9 g/dL Final   • Hematocrit 12/02/2021 29.5* 34.0 - 46.6 % Final   • RDW 12/02/2021 16.6* 12.3 - 15.4 % Final   • MCV 12/02/2021 90.2  79.0 - 97.0 fL Final   • MCH 12/02/2021 29.0  26.6 - 33.0 pg Final   • MCHC 12/02/2021 32.1  31.5 - 35.7 g/dL Final   • MPV 12/02/2021 9.1  6.0 - 12.0 fL Final   • Platelets 12/02/2021 221  140 - 450 10*3/mm3 Final   • Neutrophil % 12/02/2021 89.1* 42.7 - 76.0 % Final   • Lymphocyte % 12/02/2021 6.6* 19.6 - 45.3 % Final   • Monocyte % 12/02/2021 4.3* 5.0 - 12.0 % Final   • Neutrophils, Absolute 12/02/2021 7.70* 1.70 - 7.00 10*3/mm3 Final   • Lymphocytes, Absolute 12/02/2021 0.60* 0.70 - 3.10 10*3/mm3 Final   • Monocytes, Absolute 12/02/2021 0.40  0.10 - 0.90 10*3/mm3 Final   Hospital Outpatient Visit on 11/30/2021   Component Date Value Ref Range Status   • Glucose 11/30/2021 105  70 - 130 mg/dL Final    Meter: [id number] : [id number] [facility] Outpatient Visit on 11/24/2021   Component Date Value Ref Range Status   • Case Report 11/24/2021    Final                    Value:Surgical Pathology Report                         Case: [id number]                                  Authorizing Provider:  Adelaida Al MD         Collected:           11/24/2021 09:58 AM          Ordering Location:     [facility]   Received:            11/24/2021 10:13 AM                                 CT                                                                           Pathologist:            Rad Richards MD                                                        Specimens:   1) - Iliac, Right, flow cytometry also                                                              2) - Iliac Crest, Right - Biopsy                                                          • Clinical Information 11/24/2021    Final                    Value:This result contains rich text formatting which cannot be displayed here.   • Final Diagnosis 11/24/2021    Final                    Value:This result contains rich text formatting which cannot be displayed here.   • Comment 11/24/2021    Final                    Value:This result contains rich text formatting which cannot be displayed here.   • Gross Description 11/24/2021    Final                    Value:This result contains rich text formatting which cannot be displayed here.   • Special Stains 11/24/2021    Final                    Value:This result contains rich text formatting which cannot be displayed here.   • Microscopic Description 11/24/2021    Final                    Value:This result contains rich text formatting which cannot be displayed here.   • Flow Cytometry Summary 11/24/2021    Final                    Value:This result contains rich text formatting which cannot be displayed here.   • Aspirate Smear 11/24/2021    Final                    Value:This result contains rich text formatting which cannot be displayed here.   • Core Biopsy  11/24/2021    Final                    Value:This result contains rich text formatting which cannot be displayed here.   • Clot Section 11/24/2021    Final                    Value:This result contains rich text formatting which cannot be displayed here.        CT Head Without Contrast    Result Date: 11/18/2021  Narrative: EXAMINATION: CT HEAD WO CONTRAST-  INDICATION: Generalized weakness.  Previous CVA.  TECHNIQUE: Noncontrast head CT  COMPARISON: NONE  FINDINGS:  No acute intracranial hemorrhage. No acute large  territory infarct. Minimal subcortical and periventricular white matter hypodensities, nonspecific finding which can be seen in setting of chronic small vessel ischemic change. No extra-axial collections. Normal size and configuration of the ventricles. No midline shift or herniation. Unremarkable appearance of the orbits. No acute osseous findings. The mastoid air cells and paranasal sinuses are clear.      Impression:  No acute intracranial findings.   This report was finalized on 11/18/2021 11:41 AM by Satnam Valero MD.      CT Chest Without Contrast Diagnostic, CT Abdomen Pelvis Without Contrast    Result Date: 11/18/2021  Narrative: EXAMINATION: CT CHEST WO CONTRAST DIAGNOSTIC-, CT ABDOMEN PELVIS WO CONTRAST-  INDICATION: general weakness.  Cough.  TECHNIQUE: CT of the chest abdomen and pelvis without contrast.  COMPARISON: NONE  FINDINGS:  Heterogeneous attenuation of the thyroid gland with likely small nodules. No bulky mediastinal or hilar adenopathy. Normal size of the heart without evidence of pericardial effusion. Coronary artery calcifications are seen. Mild atherosclerosis of the thoracic aorta which appears normal in caliber. The thoracic esophagus and remaining mediastinal structures are unremarkable. The trachea and bronchi are patent. There is mild-to-moderate centrilobular upper lobe predominant emphysematous change throughout the lungs. Mild streaky atelectasis or scarring at the lung bases. No focal consolidation. No pulmonary mass or suspicious pulmonary nodule. No pleural effusion or pneumothorax.  There is large bulky retroperitoneal and mesenteric lymphadenopathy with large retroperitoneal mich conglomerate measuring up to 6.9 x 10.5 cm in transverse dimension and 14.0 cm in craniocaudal length. There is associated moderate hydronephrosis of the left kidney (series 301 image 54). The right kidney is unremarkable.  The liver is unremarkable. The gallbladder is not well seen, likely  decompressed. Normal appearance of the spleen without splenomegaly. Unremarkable appearance of the pancreas. The adrenal glands appear normal. No bowel obstruction. There is colonic diverticulosis without diverticulitis. No free intra-abdominal fluid or pneumoperitoneum. Unremarkable appearance of the uterus. The ovaries are not well-visualized. No acute osseous findings. Chronic appearing moderate superior endplate compression deformity of L3 with cement augmentation.      Impression:  There is a large lobular retroperitoneal soft tissue mass measuring 6.9 x 10.5 x 14.0 cm, suboptimally assessed on this noncontrast exam. This abuts the inferior pole the left kidney. Overall this finding is concerning for large lymphadenopathy/mich conglomerate. This results in moderate left-sided hydroureteronephrosis. Contrast-enhanced CT may help further characterize and delineate this mass.  No acute thoracic findings. Mild-to-moderate centrilobular emphysema.   This report was finalized on 11/18/2021 11:57 AM by Satnam Valero MD.      CT Abdomen Pelvis With Contrast    Result Date: 11/18/2021  Narrative: EXAMINATION: CT ABDOMEN PELVIS W CONTRAST-  INDICATION: left Renal Mass; N28.89-Other specified disorders of kidney and ureter; N39.0-Urinary tract infection, site not specified; Z78.9-Other specified health status; D64.9-Anemia, unspecified  TECHNIQUE: CT the abdomen and pelvis with IV contrast  COMPARISON: Same-day noncontrast CT abdomen pelvis  FINDINGS:  Redemonstration of a large lobulated retroperitoneal soft tissue mass measuring 5.4 x 10.6 x 14.8 cm, suspicious for retroperitoneal lymphadenopathy/mich conglomerate. This extends to the lower pole the left kidney where there is abnormal hypodense attenuation of the lower pole cortex (series 900 image 50). There is resultant moderate left-sided hydroureter nephrosis from mass effect. There appears to be mild-to-moderate regions of narrowing of the left renal vein which  remains patent. The left renal artery passes through the mass without significant narrowing. The atherosclerotic abdominal aorta appears patent without significant narrowing. There is mild delayed enhancement and excretion of the left kidney. There are a few mildly enlarged lymph nodes more inferiorly along the iliac chains.  The lung bases and lower thorax are unremarkable with the exception of mild right lung base atelectasis. The gallbladder is not visualized, possibly surgically absent or decompressed. Normal appearance of the spleen without splenomegaly. The adrenal glands are unremarkable. Colonic diverticulosis without diverticulitis. No bowel obstruction. Unremarkable appearance of the uterus. The ovaries are not well-visualized. No free intra-abdominal fluid or conspicuous mesenteric fat stranding. No pneumoperitoneum. No acute osseous findings. Chronic moderate superior endplate compression deformity with cement augmentation of L3.      Impression:  Redemonstration of a large lobulated retroperitoneal soft tissue mass measuring 5.4 x 10.6 x 14.8 cm, suspicious for lymphadenopathy/mich conglomerate, compatible with lymphomatous process. The left margin of this conglomerate extends to the inferior pole the left kidney where there is contiguous abnormal soft tissue in attenuation into the renal cortex. It is unclear whether this represents lymphomatous extension/involvement into the kidney, or whether this reflects renal lymphoma with retroperitoneal extension. There is moderate left-sided hydronephrosis from mass effect, as well as mild-to-moderate regions of narrowing of the left renal vein. There is mild delayed enhancement and excretion of the left kidney.   This report was finalized on 11/18/2021 2:50 PM by Satanm Valero MD.      IR Port Placement    Result Date: 11/24/2021  Narrative: Procedure: Portacath placement.                                                   History: Need for chemotherapy     : Krishan Rogel MD.  Assistant: None.                                                                            Modality: Sonography and fluoroscopy                DOSE REDUCTION: The examination was performed according to departmental dose-optimization program which includes automated exposure control, adjustment of the mA and/or kV.  Fluoro time: 0.06 minutes.  Radiation dose: 1 mGy air Kerma.   SEDATION: Moderate sedation was administered. 0.5 milligram of Versed and 0 micrograms of fentanyl IV was used for moderate sedation monitored under my direction. Total intra service time of sedation was 22 minutes. The patient's vital signs were monitored throughout the procedure and recorded in the patient's medical record by the nurse.                                                                             Anesthesia: Lidocaine local infiltration.        Medicines: None       Estimated blood loss:  < 5 cc.          Technique: A thorough discussion of the risks, benefits, and alternatives of the procedure, and if applicable, moderate sedation was carried out with the patient or the patient's next of kin. Any questions were answered. They verbalized understanding. A written informed consent was then signed.   A timeout was performed prior to starting the procedure.  The procedure room personnel used personal protective equipment. The operators used sterile gowns and gloves in addition. The surgical site was prepped with chlorhexidine gluconate and draped in the maximal sterile fashion.  A preliminary ultrasonogram was performed of the neck that revealed a patent and compressible internal jugular vein. Pertinent ultrasound images were stored in the PACS for documentation.  Using aseptic precautions, real-time ultrasound guidance, the internal jugular vein was accessed after local anesthetic infiltration and dermatotomy with a micropuncture needle. A 018 guidewire was advanced into the central venous  system under fluoroscopic guidance. Over the wire a micropuncture sheath was placed. Through the micropuncture sheath, a 035 wire was advanced into the venous system under fluoroscopic guidance. Over the wire a peel-away sheath was placed.  After local anesthesia, using sharp and blunt dissection, a reservoir pocket of appropriate size was created in the lateral subclavicular area. The catheter was connected to the port reservoir. The catheter was tunneled to the venotomy site using a tunneling device. The the reservoir was placed in the reservoir pocket. The catheter was trimmed to an appropriate length. The catheter was advanced into the venous system through the peel-away sheath which was removed.  The port aspirated and flushed well and was terminally packed with heparin 100 units per cc, total 500 units.  The port reservoir was irrigated with saline. The incision was closed in layers using absorbable suture and Dermabond. The venotomy site was closed using Dermabond. An aseptic dressing was applied using the protocol for Dermabond.  The patient was transferred to the recovery area and was discharged from the department in stable condition.                     Complications: None immediate.     Findings: Patent and compressible internal jugular vein. Final image shows the indy catheter to be in good position with the catheter tip at the cavoatrial junction/right atrium, an excellent position for use. There is no immediate complication.                                                               Impression: Impression:    Successful ultrasound and fluoroscopic guided right internal jugular vein route single lumen Port-A-Cath placement as described above.  Thank you for the opportunity to assist in the care of your patient.  This report was finalized on 11/24/2021 6:21 PM by Krishan Rogel MD.      Adult Transthoracic Echo Limited W/ Cont if Necessary Per Protocol    Result Date: 11/19/2021  Narrative: ·  Limited 2D study. · Normal-sized cardiac chambers. · Borderline LVH. · Normal left ventricular systolic function, estimated EF 65%. · Grossly normal cardiac valves, Doppler was not performed. · No pericardial effusion.      NM PET/CT Skull Base to Mid Thigh    Result Date: 12/1/2021  Narrative: EXAMINATION: NM PET/CT SKULL BASE TO MID THIGH- 11/30/2021  INDICATION: R19.00-Intra-abdominal and pelvic swelling, mass and lump, unspecified site; R93.5-Abnormal findings on diagnostic imaging of other abdominal regions, including retroperitoneum  TECHNIQUE: Fasting blood glucose 105 mg/dL with radiopharmaceutical injection 12.94 mCi of F-18 FDG injected into a left AC vein with patient imaged after appropriate amount of time. CT datasets performed for fusion analysis alone and should not be used for diagnostic purposes as these are low-dose protocol.  The radiation dose reduction device was turned on for each scan per the ALARA (As Low as Reasonably Achievable) protocol.  COMPARISON: CT biopsy 11/24/2021, CT abdomen and pelvis 11/18/2021.  FINDINGS:  HEAD AND NECK: Grossly symmetric appearance of cerebral hemispheres on limited intracranial evaluation. Physiologic activity within the extraocular muscles, muscles of phonation and tonsils without abnormal hypermetabolism superimposed at the left base of tongue region somewhat indeterminate but asymmetric and potentially thickened soft tissue maximum SUV 6.1 along with asymmetric activity of a right level 2 lymph node maximum SUV 2.9. Left supraclavicular adenopathy enlarged with intensely abnormal metabolic left supraclavicular lymph node 18.7 maximum SUV.  CHEST: Physiologic activity within the left ventricular myocardium. No abnormal hypermetabolism within the pulmonary parenchyma or chest wall with somewhat indeterminate areas of hypermetabolism right cardiac region including right atrium for example maximum SUV 4.6 similar out of proportion to expected findings given  lack of intraventricular or left ventricular involvement otherwise adjacent to a right chest wall Port-A-Cath but appearing separate without clear CT nonfused correlate.  ABDOMEN AND PELVIS: Physiologic activity within the liver, spleen, renal collecting systems and GI tract. Large lobulated retroperitoneal mass lesion intensely hypermetabolic with adjacent adenopathy in the retroperitoneal region creating mass effect with intense hypermetabolism maximum SUV 23.9. Left pelvic sidewall lymph nodes asymmetrically enlarged and intensely hypermetabolic maximum SUV 21.3. Osseous structures and proximal thighs unremarkable.      Impression: Large mass occupying mass within the retroperitoneum extending leftward surrounding or enveloping the abdominal aorta intensely hypermetabolic with intensely abnormal left pelvic sidewall lymph nodes and adjacent retroperitoneal adenopathy. Intensely hypermetabolic left supraclavicular and right level 2 cervical lymph nodes with questionable focus at the left base of tongue or floor of mouth region mildly asymmetric as well as suspicious findings in the right cardiac region adjacent to the right atrium appearing separate from the right chest wall Port-A-Cath and more punctate than expected given adjacent limited left ventricular activity as expected. Limited soft tissue correlate of the cardiac areas of concern however could be artifactual from injection although indeterminate with attention on follow-up or with potential echo.  D: 11/30/2021 E: 11/30/2021    This report was finalized on 12/1/2021 1:33 PM by Dr. Gray Corona.      CT Bone marrow biopsy and aspiration, CT Needle Biopsy Soft Tissue    Result Date: 11/24/2021  Narrative: CT Guided Bone Marrow Biopsy                                                         History: Suspected lymphoma                                                 : Krishan Rogel MD.  Assistant: None.                                                                             Modality: CT.                DOSE REDUCTION: The examination was performed according to departmental dose-optimization program which includes automated exposure control, adjustment of the mA and/or kV according to patient size and/or use of iterative reconstruction technique.  Radiation dose: 606 mGy-cm for both the procedures.                                                                             SEDATION: Moderate sedation was administered. 0.5 Milligram of Versed and 0 micrograms of fentanyl IV was used for moderate sedation monitored under my direction for both the procedures. Total intra service time of sedation was 22 minutes. The patient's vital signs were monitored throughout the procedure and recorded in the patient's medical record by the nurse.  Anesthesia: Lidocaine local infiltration.  Estimated blood loss:  < 5 cc.          Technique: A thorough discussion of the risks, benefits, and alternatives of the procedure, and if applicable, moderate sedation was carried out with the patient or the patient's next of kin. Any questions were answered. They verbalized understanding. A written informed consent was then signed.   A timeout was performed prior to starting the procedure.  The procedure room personnel used personal protective equipment. The operators used sterile gowns and gloves in addition. The surgical site was prepped with chlorhexidine gluconate and draped in the maximal sterile fashion.  The patient was laid prone on the CT table with a CT scan performed through the region of interest. Right posterior superior iliac spine was targeted for biopsy. A limited CT was performed through the region of interest with a grid in place to determine access site, angle and depth.  A posterior oblique access into the posterior superior iliac spine on the target side was selected, marked on the skin and subjected to a sterile prep and drape with chlorhexidine gluconate.  After local anesthesia and dermatotomy, a biopsy needle was positioned at the cortex of the posterior superior iliac spine. This was followed by use of a motorized drill to advance the biopsy needle for approximately half a centimeter into the marrow space for performance of bone marrow aspirate. Approximately 20 cc of aspirate was handed over to the . The needle was then advanced another approximately 1 cm into the bone marrow for a core biopsy that was handed over to the technologist. Hemostasis was achieved by manual compression. An aseptic dressing was applied.  The patient tolerated the procedure well and after recovery was discharged from the department in stable condition.                    Complications: None immediate.  Specimen: Bone marrow aspirate and bone core.                                                               Impression: Impression:                                                              Successful CT-guided bone marrow aspiration and bone biopsy using the right posterior superior iliac spine access under CT guidance as described above.  Tissue CT Guided FNA and core biopsy left retroperitoneal matted lymph nodes.                                                                                                              Technique:  The patient was already positioned prone on the CT table for the Bone Marrow Bx. A preliminary CT was performed to assess the target and determine a safe access site. It showed the target. A left paraspinal access site was selected. A repeat limited CT was performed with a radiopaque marker in place to determine the exact access site, distance and angle to the target. The site was sterilely prepped with chlorhexidine gluconate and draped. After local anesthesia infiltration, a dermatotomy was performed.  Using aseptic precautions, under CT guidance, the target lesion was accessed with a 17-gauge guide.  Using a coaxial 22-gauge Chiba  needle, FNA was performed for flow cytometry. The specimen was deposited in the cell carrier solution.  Using 18-gauge coaxial biopsy gun, multiple core biopsies of the targeted tissue were performed. The specimen was submitted in formalin for further processing.  At the end of the procedure, the needle guide was withdrawn and an aseptic dressing applied. The patient tolerated the procedure well. After recovery, the patient was discharged from the department in stable condition.           Complications: None immediate.  Specimen: 3 cores submitted in formalin. One FNA submitted in cell transport medium for flows.                                                           Impression:                                                              Successful CT guided FNA and core biopsy of large mass of matted lymph nodes in the left para-aortic region.  Thank you for the opportunity to assist in the care of your patient.   This report was finalized on 11/24/2021 6:20 PM by Krishan Rogel MD.        ASSESSMENT: The patient is a very pleasant 76 y.o. female  with diffuse large B-cell lymphoma    PROBLEM LIST:  1.  Diffuse large B-cell lymphoma with increased proliferation rate and pending FISH studies, stage IIIb:  A.  Presented with fatigue unexplained weight loss  B.  Diagnosed after CT-guided retroperitoneal mass biopsy done by IR November 24, 2021  C.  Bone marrow biopsy done November 24, 2021 negative for lymphoma involvement  D.  Whole-body PET scan done on November 30, 2021 revealed disease activity above and below the diaphragm with bulky mass next the left kidney.  E.  Started R-CHOP chemotherapy December 1, 2021  2.  Weight loss  3.  Hypertension  4.  Hypercholesteremia  5. Acute kidney injury    PLAN:  1.  I will proceed with chemotherapy as scheduled today R-CHOP cycle #1.  2.  The patient will follow up with us in 3 weeks for cycle #2.  3.  We will plan a total of 6 cycles based on response and tolerance.  4.   I did go over the PET scan results with the patient I reviewed the films myself my personal interpretation hypermetabolic activity multiple lymph nodes above and below the diaphragm with bulky mass next the left kidney.  I will repeat the patient's scans prior to cycle #4.  5.  I will monitor the patient blood work including blood counts kidney function liver function and electrolytes.  6.  I will start the patient prednisone 100 mg day 1 through 5 of each cycle.  I will add GI prophylaxis with Prilosec 40 mg daily.  7.  I will start the patient on allopurinol 3 mg daily for 1 month. The patient will also receive 1 dose of rasburicase today given her bulky disease as well as elevated creatinine that went up from normal 0.72 weeks ago to 1.2 today.  8.  We discussed the potential risks and side effects of R CHOP chemotherapy including neutropenia, alopecia, nausea and vomiting, fatigue, neuropathy, cardiomyopathy, constipation, infusion reaction, and a small risk of myelodysplasia.  9.  I will start the patient on Zofran as needed for chemotherapy-induced nausea.  10.  I will monitor the patient blood pressure.  May have to adjust metoprolol dose while she is on chemotherapy.  11.  We will continue port care.  I will give the patient prescription for EMLA cream.  12.  I did go over the cardiac echo result with the patient reassured her she had adequate ejection fraction.    Time spent on encounter 40 minutes including preparation of patient chart prior to her arrival, discussing plan with multiple providers including Dr. Rogel from edematous radiology and Dr. Richards from pathology, sending in prescriptions, personally reviewing and interpreting imaging results, going over potential treatment-related toxicities, reviewing and signing chemotherapy orders, and finalizing my note.        Adelaida Al MD  12/3/2021

## 2021-12-02 NOTE — PROGRESS NOTES
CHEMOTHERAPY PREPARATION    Vale Cee  [id number]  1945    Chief Complaint: Treatment preparation and needs assessment    History of present illness:  Vale Cee is a 76 y.o. year old female who is here today for treatment preparation and needs assessment.  The patient has been diagnosed with diffuse large B cell lymphoma and is scheduled to begin treatment with RiTUXimab / Cyclophosphamide / DOXOrubicin / VinCRIStine / PredniSONE.     Oncology History:    Oncology/Hematology History   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)   12/1/2021 Initial Diagnosis    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)     12/3/2021 -  Chemotherapy    OP LYMPHOMA R-CHOP RiTUXimab / Cyclophosphamide / DOXOrubicin / VinCRIStine / PredniSONE         The current medication list and allergy list were reviewed and reconciled.     Past Medical History, Past Surgical History, Social History, Family History have been reviewed and are without significant changes except as mentioned.    Review of Systems:    Review of Systems   Constitutional: Positive for appetite change and fatigue. Negative for fever and unexpected weight change.   HENT: Negative for mouth sores, sore throat and trouble swallowing.    Respiratory: Negative for cough, shortness of breath and wheezing.    Cardiovascular: Negative for chest pain, palpitations and leg swelling.   Gastrointestinal: Positive for nausea. Negative for abdominal distention, abdominal pain, constipation, diarrhea and vomiting.   Genitourinary: Negative for difficulty urinating, dysuria and frequency.   Musculoskeletal: Negative for arthralgias.   Skin: Negative for pallor, rash and wound.   Neurological: Negative for dizziness and weakness.   Hematological: Does not bruise/bleed easily.   Psychiatric/Behavioral: Negative for confusion and sleep disturbance. The patient is not nervous/anxious.        Physical Exam:    Vitals:    12/02/21 1502    BP: 100/61   Pulse: 74   Resp: 16   Temp: 98.4 °F (36.9 °C)   SpO2: 98%     Vitals:    12/02/21 1502   PainSc: 0-No pain          ECOG: (2) Ambulatory & Capable of Self Care, Unable to Carry Out Work Activity, Up & About Greater Than 50% of Waking Hours    General: thin appearing, in no acute distress  Psych: Mood is stable            NEEDS ASSESSMENTS    Genetics  The patient's new diagnosis and family history have been reviewed for genetic counseling needs. A genetic referral is not recommended.     Psychosocial  The patient has completed a PHQ-9 Depression Screening and the Distress Thermometer (DT) today.   PHQ-9 results show 15-19 (Moderately Severe Depression). The patient scored their distress today as 0 on a scale of 0-10 with 0 being no distress and 10 being extreme distress.   Problems marked by the patient as being an issue for them within the last week include emotional problems and physical problems.   Results were reviewed along with psychosocial resources offered by our cancer center.  Our oncology social worker will be flagged for a DT score of 4 or above, and a same day call will be made for a score of 9 or 10.  A mental health referral is offered at this time. The patient is not interested in a referral to [facility].   Copies of patient's questionnaires will be scanned into EMR for details and further reference.    Barriers to care  A barriers form was also completed by the patient today. We discussed services offered by our facility to help her have adequate access to care. The patient was given the name and contact information for our Oncology Social Worker, Radha Ferrer.  Based upon barriers assessment today, the patient will not require a follow-up call from the  to further discuss needs.   A copy of the barriers form will also be scanned into EMR for details and further reference.     VAD Assessment  The patient and I discussed planned intervenous chemotherapy as well  "as other IV treatments that are often needed throughout the course of treatment. These may include, but are not limited to blood transfusions, antibiotics, and IV hydration. The vasculature does appear to be adequate for multiple peripheral IVs throughout their treatment course.  Patient has Port-A-Cath in right upper chest.    Advanced Care Planning  The patient and I discussed advanced care planning, \"Conversations that Matter\".   This service was offered, free of charge, for development of advance directives with a certified ACP facilitator.  The patient does not have an up-to-date advanced directive. The patient is not interested in an appointment with one of our facilitators to create or update their advanced directives.      Palliative Care  The patient and I discussed palliative care services. Palliative care is not the same as Hospice care. This is specialized medical care for people living with serious illness with the goal of improving quality of life for the patient and their family. [facility] has partnered with [facility] to offer our patients outpatient palliative care early along with their treatment to assist in coordination of care, symptom management, pain management, and medical decision making.  Oncology criteria for palliative care referral is not met at this time. The patient is not interested in a palliative care consultation.     Additional Referral needs  none      CHEMOTHERAPY EDUCATION    Chemotherapy education completed and consent obtained per oncology pharmacist.  See their documentation for further details.    Booklets Given: Chemotherapy and You [x]  Nutrition for the Patient with Cancer During Treatment [x]    Sexuality/Fertility Books []     Chemotherapy Regimen:   Treatment Plans     Name Type Plan Dates Plan Provider         Active    OP LYMPHOMA R-CHOP RiTUXimab / Cyclophosphamide / DOXOrubicin / VinCRIStine / PredniSONE ONCOLOGY TREATMENT  12/1/2021 - Present Adelaida BALDERRAMA" MD Mikaela                    Chemotherapy education comprehension reviewed. Questions answered.      Assessment and Plan:    Diagnoses and all orders for this visit:    1. 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) (Primary)  -     lidocaine-prilocaine (EMLA) 2.5-2.5 % cream; Apply 1 application topically to the appropriate area as directed As Needed (45-60 minutes prior to port access.  Cover with saran/plastic wrap.).  Dispense: 30 g; Refill: 3      The patient and I have reviewed their cancer diagnosis and scheduled treatment plan. Needs assessment was completed including genetics, psychosocial needs, barriers to care, VAD evaluation, advanced care planning, and palliative care services. Referrals have been ordered as appropriate based upon our evaluation and patient desires.     Chemotherapy teaching was also completed today per pharmacy.  Adequate time was given to answer questions.  Patient and family are aware of their care team members and contact information if they have questions or problems throughout the treatment course. The patient is adequately prepared to begin treatment as scheduled tomorrow.     Reviewed with patient education regarding EMLA cream, prednisone and Zofran prescriptions sent to pharmacy.       Patient had pretreatment labs drawn today.    I spent 30 minutes caring for Vale on this date of service. This time includes time spent by me in the following activities: preparing for the visit, reviewing tests, performing a medically appropriate examination and/or evaluation, counseling and educating the patient/family/caregiver, ordering medications, tests, or procedures, documenting information in the medical record and care coordination.     Naomi Lau, APRN  12/02/21

## 2021-12-03 ENCOUNTER — HOSPITAL ENCOUNTER (OUTPATIENT)
Dept: ONCOLOGY | Facility: HOSPITAL | Age: 76
Setting detail: INFUSION SERIES
Discharge: HOME | End: 2021-12-03

## 2021-12-03 ENCOUNTER — OFFICE VISIT (OUTPATIENT)
Dept: ONCOLOGY | Facility: CLINIC | Age: 76
End: 2021-12-03

## 2021-12-03 VITALS
HEART RATE: 86 BPM | SYSTOLIC BLOOD PRESSURE: 107 MMHG | OXYGEN SATURATION: 98 % | WEIGHT: 131 LBS | RESPIRATION RATE: 16 BRPM | BODY MASS INDEX: 21.83 KG/M2 | HEIGHT: 65 IN | DIASTOLIC BLOOD PRESSURE: 58 MMHG | TEMPERATURE: 96.8 F

## 2021-12-03 VITALS — HEART RATE: 63 BPM | DIASTOLIC BLOOD PRESSURE: 50 MMHG | SYSTOLIC BLOOD PRESSURE: 105 MMHG | TEMPERATURE: 97.4 F

## 2021-12-03 DIAGNOSIS — C83.33 DIFFUSE LARGE B-CELL LYMPHOMA OF INTRA-ABDOMINAL LYMPH NODES (HCC): Primary | ICD-10-CM

## 2021-12-03 DIAGNOSIS — Z45.2 ENCOUNTER FOR CENTRAL LINE CARE: ICD-10-CM

## 2021-12-03 LAB
HBV CORE AB SERPL QL IA: NEGATIVE
HBV SURFACE AB SER RIA-ACNC: NORMAL
URATE SERPL-MCNC: 2.4 MG/DL (ref 2.4–5.7)

## 2021-12-03 PROCEDURE — 96360 HYDRATION IV INFUSION INIT: CPT

## 2021-12-03 PROCEDURE — 99215 OFFICE O/P EST HI 40 MIN: CPT | Performed by: INTERNAL MEDICINE

## 2021-12-03 PROCEDURE — 25010000002 PEGFILGRASTIM 6 MG/0.6ML PREFILLED SYRINGE KIT: Performed by: INTERNAL MEDICINE

## 2021-12-03 PROCEDURE — 25010000002 VINCRISTINE PER 1 MG: Performed by: INTERNAL MEDICINE

## 2021-12-03 PROCEDURE — 96361 HYDRATE IV INFUSION ADD-ON: CPT

## 2021-12-03 PROCEDURE — 25010000002 FOSAPREPITANT PER 1 MG: Performed by: INTERNAL MEDICINE

## 2021-12-03 PROCEDURE — 25010000002 RITUXIMAB 10 MG/ML SOLUTION 10 ML VIAL: Performed by: INTERNAL MEDICINE

## 2021-12-03 PROCEDURE — 96411 CHEMO IV PUSH ADDL DRUG: CPT

## 2021-12-03 PROCEDURE — 25010000002 DEXAMETHASONE SODIUM PHOSPHATE 100 MG/10ML SOLUTION: Performed by: INTERNAL MEDICINE

## 2021-12-03 PROCEDURE — 25010000002 HEPARIN LOCK FLUSH PER 10 UNITS: Performed by: INTERNAL MEDICINE

## 2021-12-03 PROCEDURE — 96365 THER/PROPH/DIAG IV INF INIT: CPT

## 2021-12-03 PROCEDURE — 25010000002 PALONOSETRON 0.25 MG/5ML SOLUTION PREFILLED SYRINGE: Performed by: INTERNAL MEDICINE

## 2021-12-03 PROCEDURE — 25010000002 DOXORUBICIN PER 10 MG: Performed by: INTERNAL MEDICINE

## 2021-12-03 PROCEDURE — 96366 THER/PROPH/DIAG IV INF ADDON: CPT

## 2021-12-03 PROCEDURE — 25010000002 RITUXIMAB 10 MG/ML SOLUTION 50 ML VIAL: Performed by: INTERNAL MEDICINE

## 2021-12-03 PROCEDURE — 96375 TX/PRO/DX INJ NEW DRUG ADDON: CPT

## 2021-12-03 PROCEDURE — 25010000002 DEXAMETHASONE PER 1 MG: Performed by: INTERNAL MEDICINE

## 2021-12-03 PROCEDURE — 96367 TX/PROPH/DG ADDL SEQ IV INF: CPT

## 2021-12-03 PROCEDURE — 96377 APPLICATON ON-BODY INJECTOR: CPT

## 2021-12-03 PROCEDURE — 96415 CHEMO IV INFUSION ADDL HR: CPT

## 2021-12-03 PROCEDURE — 25010000002 RASBURICASE PER 0.5 MG: Performed by: INTERNAL MEDICINE

## 2021-12-03 PROCEDURE — 96413 CHEMO IV INFUSION 1 HR: CPT

## 2021-12-03 PROCEDURE — 25010000002 DIPHENHYDRAMINE PER 50 MG: Performed by: INTERNAL MEDICINE

## 2021-12-03 PROCEDURE — 25010000002 CYCLOPHOSPHAMIDE 1 GM/5ML SOLUTION 5 ML VIAL: Performed by: INTERNAL MEDICINE

## 2021-12-03 PROCEDURE — 96417 CHEMO IV INFUS EACH ADDL SEQ: CPT

## 2021-12-03 RX ORDER — POTASSIUM CHLORIDE 750 MG/1
TABLET, FILM COATED, EXTENDED RELEASE ORAL
COMMUNITY
Start: 2021-09-09

## 2021-12-03 RX ORDER — ACETAMINOPHEN 325 MG/1
650 TABLET ORAL ONCE
Status: COMPLETED | OUTPATIENT
Start: 2021-12-03 | End: 2021-12-03

## 2021-12-03 RX ORDER — OLANZAPINE 5 MG/1
5 TABLET ORAL ONCE
Status: COMPLETED | OUTPATIENT
Start: 2021-12-03 | End: 2021-12-03

## 2021-12-03 RX ORDER — FAMOTIDINE 10 MG/ML
20 INJECTION, SOLUTION INTRAVENOUS AS NEEDED
Status: DISCONTINUED | OUTPATIENT
Start: 2021-12-03 | End: 2021-12-04 | Stop reason: HOSPADM

## 2021-12-03 RX ORDER — NITROFURANTOIN 25; 75 MG/1; MG/1
CAPSULE ORAL
COMMUNITY
Start: 2021-10-06 | End: 2022-02-19 | Stop reason: HOSPADM

## 2021-12-03 RX ORDER — SODIUM CHLORIDE 0.9 % (FLUSH) 0.9 %
10 SYRINGE (ML) INJECTION AS NEEDED
Status: CANCELLED | OUTPATIENT
Start: 2021-12-03

## 2021-12-03 RX ORDER — HEPARIN SODIUM (PORCINE) LOCK FLUSH IV SOLN 100 UNIT/ML 100 UNIT/ML
500 SOLUTION INTRAVENOUS AS NEEDED
Status: DISCONTINUED | OUTPATIENT
Start: 2021-12-03 | End: 2021-12-04 | Stop reason: HOSPADM

## 2021-12-03 RX ORDER — SODIUM CHLORIDE 9 MG/ML
250 INJECTION, SOLUTION INTRAVENOUS ONCE
Status: COMPLETED | OUTPATIENT
Start: 2021-12-03 | End: 2021-12-03

## 2021-12-03 RX ORDER — HYDROCHLOROTHIAZIDE 12.5 MG/1
TABLET ORAL
COMMUNITY
Start: 2021-09-20 | End: 2022-02-16

## 2021-12-03 RX ORDER — MEPERIDINE HYDROCHLORIDE 25 MG/ML
25 INJECTION INTRAMUSCULAR; INTRAVENOUS; SUBCUTANEOUS
Status: ACTIVE | OUTPATIENT
Start: 2021-12-03 | End: 2021-12-03

## 2021-12-03 RX ORDER — PALONOSETRON 0.05 MG/ML
0.25 INJECTION, SOLUTION INTRAVENOUS ONCE
Status: COMPLETED | OUTPATIENT
Start: 2021-12-03 | End: 2021-12-03

## 2021-12-03 RX ORDER — DIPHENHYDRAMINE HYDROCHLORIDE 50 MG/ML
50 INJECTION INTRAMUSCULAR; INTRAVENOUS AS NEEDED
Status: DISCONTINUED | OUTPATIENT
Start: 2021-12-03 | End: 2021-12-04 | Stop reason: HOSPADM

## 2021-12-03 RX ORDER — DOXORUBICIN HYDROCHLORIDE 2 MG/ML
50 INJECTION, SOLUTION INTRAVENOUS ONCE
Status: COMPLETED | OUTPATIENT
Start: 2021-12-03 | End: 2021-12-03

## 2021-12-03 RX ORDER — HEPARIN SODIUM (PORCINE) LOCK FLUSH IV SOLN 100 UNIT/ML 100 UNIT/ML
500 SOLUTION INTRAVENOUS AS NEEDED
Status: CANCELLED | OUTPATIENT
Start: 2021-12-03

## 2021-12-03 RX ADMIN — DIPHENHYDRAMINE HYDROCHLORIDE 50 MG: 50 INJECTION INTRAMUSCULAR; INTRAVENOUS at 10:07

## 2021-12-03 RX ADMIN — DEXAMETHASONE SODIUM PHOSPHATE 12 MG: 10 INJECTION, SOLUTION INTRAMUSCULAR; INTRAVENOUS at 14:38

## 2021-12-03 RX ADMIN — CYCLOPHOSPHAMIDE 1250 MG: 200 INJECTION, SOLUTION INTRAVENOUS at 15:43

## 2021-12-03 RX ADMIN — SODIUM CHLORIDE 150 MG: 9 INJECTION, SOLUTION INTRAVENOUS at 09:36

## 2021-12-03 RX ADMIN — OLANZAPINE 5 MG: 5 TABLET, FILM COATED ORAL at 09:33

## 2021-12-03 RX ADMIN — DOXORUBICIN HYDROCHLORIDE 42 MG: 2 INJECTION, SOLUTION INTRAVENOUS at 15:12

## 2021-12-03 RX ADMIN — PEGFILGRASTIM 6 MG: KIT SUBCUTANEOUS at 16:16

## 2021-12-03 RX ADMIN — HEPARIN 500 UNITS: 100 SYRINGE at 16:18

## 2021-12-03 RX ADMIN — SODIUM CHLORIDE 6 MG: 9 INJECTION, SOLUTION INTRAVENOUS at 10:22

## 2021-12-03 RX ADMIN — SODIUM CHLORIDE 250 ML: 9 INJECTION, SOLUTION INTRAVENOUS at 11:06

## 2021-12-03 RX ADMIN — VINCRISTINE SULFATE 2 MG: 1 INJECTION, SOLUTION INTRAVENOUS at 15:25

## 2021-12-03 RX ADMIN — SODIUM CHLORIDE 1000 ML: 9 INJECTION, SOLUTION INTRAVENOUS at 09:03

## 2021-12-03 RX ADMIN — RITUXIMAB 600 MG: 10 INJECTION, SOLUTION INTRAVENOUS at 11:20

## 2021-12-03 RX ADMIN — PALONOSETRON 0.25 MG: 0.25 INJECTION, SOLUTION INTRAVENOUS at 09:37

## 2021-12-03 RX ADMIN — ACETAMINOPHEN 650 MG: 325 TABLET, FILM COATED ORAL at 09:33

## 2021-12-06 ENCOUNTER — HOSPITAL ENCOUNTER (OUTPATIENT)
Dept: ONCOLOGY | Facility: HOSPITAL | Age: 76
Setting detail: INFUSION SERIES
Discharge: HOME | End: 2021-12-06

## 2021-12-06 VITALS
HEART RATE: 66 BPM | RESPIRATION RATE: 18 BRPM | TEMPERATURE: 97.9 F | WEIGHT: 138 LBS | BODY MASS INDEX: 22.99 KG/M2 | DIASTOLIC BLOOD PRESSURE: 54 MMHG | SYSTOLIC BLOOD PRESSURE: 87 MMHG | HEIGHT: 65 IN

## 2021-12-06 DIAGNOSIS — Z45.2 ENCOUNTER FOR CENTRAL LINE CARE: ICD-10-CM

## 2021-12-06 DIAGNOSIS — C83.33 DIFFUSE LARGE B-CELL LYMPHOMA OF INTRA-ABDOMINAL LYMPH NODES (HCC): Primary | ICD-10-CM

## 2021-12-06 LAB
ALBUMIN SERPL-MCNC: 2.9 G/DL (ref 3.5–5.2)
ALBUMIN/GLOB SERPL: 0.9 G/DL
ALP SERPL-CCNC: 84 U/L (ref 39–117)
ALT SERPL W P-5'-P-CCNC: 12 U/L (ref 1–33)
ANION GAP SERPL CALCULATED.3IONS-SCNC: 10 MMOL/L (ref 5–15)
AST SERPL-CCNC: 25 U/L (ref 1–32)
BILIRUB SERPL-MCNC: 0.3 MG/DL (ref 0–1.2)
BUN SERPL-MCNC: 29 MG/DL (ref 8–23)
BUN/CREAT SERPL: 40.3 (ref 7–25)
CALCIUM SPEC-SCNC: 8.9 MG/DL (ref 8.6–10.5)
CHLORIDE SERPL-SCNC: 103 MMOL/L (ref 98–107)
CO2 SERPL-SCNC: 26 MMOL/L (ref 22–29)
CREAT SERPL-MCNC: 0.72 MG/DL (ref 0.57–1)
ERYTHROCYTE [DISTWIDTH] IN BLOOD BY AUTOMATED COUNT: 16.4 % (ref 12.3–15.4)
GFR SERPL CREATININE-BSD FRML MDRD: 78 ML/MIN/1.73
GLOBULIN UR ELPH-MCNC: 3.1 GM/DL
GLUCOSE SERPL-MCNC: 181 MG/DL (ref 65–99)
HCT VFR BLD AUTO: 24.7 % (ref 34–46.6)
HGB BLD-MCNC: 8 G/DL (ref 12–15.9)
LYMPHOCYTES # BLD AUTO: 0.8 10*3/MM3 (ref 0.7–3.1)
LYMPHOCYTES NFR BLD AUTO: 2.3 % (ref 19.6–45.3)
MCH RBC QN AUTO: 29.5 PG (ref 26.6–33)
MCHC RBC AUTO-ENTMCNC: 32.4 G/DL (ref 31.5–35.7)
MCV RBC AUTO: 91.2 FL (ref 79–97)
MONOCYTES # BLD AUTO: 0.3 10*3/MM3 (ref 0.1–0.9)
MONOCYTES NFR BLD AUTO: 0.8 % (ref 5–12)
NEUTROPHILS NFR BLD AUTO: 33.4 10*3/MM3 (ref 1.7–7)
NEUTROPHILS NFR BLD AUTO: 96.9 % (ref 42.7–76)
PLATELET # BLD AUTO: 179 10*3/MM3 (ref 140–450)
PMV BLD AUTO: 9 FL (ref 6–12)
POTASSIUM SERPL-SCNC: 3.6 MMOL/L (ref 3.5–5.2)
PROT SERPL-MCNC: 6 G/DL (ref 6–8.5)
RBC # BLD AUTO: 2.7 10*6/MM3 (ref 3.77–5.28)
SODIUM SERPL-SCNC: 139 MMOL/L (ref 136–145)
URATE SERPL-MCNC: 1.2 MG/DL (ref 2.4–5.7)
WBC NRBC COR # BLD: 34.5 10*3/MM3 (ref 3.4–10.8)

## 2021-12-06 PROCEDURE — 80053 COMPREHEN METABOLIC PANEL: CPT | Performed by: INTERNAL MEDICINE

## 2021-12-06 PROCEDURE — 96360 HYDRATION IV INFUSION INIT: CPT

## 2021-12-06 PROCEDURE — 84550 ASSAY OF BLOOD/URIC ACID: CPT | Performed by: INTERNAL MEDICINE

## 2021-12-06 PROCEDURE — 25010000002 HEPARIN LOCK FLUSH PER 10 UNITS: Performed by: INTERNAL MEDICINE

## 2021-12-06 PROCEDURE — 85025 COMPLETE CBC W/AUTO DIFF WBC: CPT | Performed by: INTERNAL MEDICINE

## 2021-12-06 RX ORDER — HEPARIN SODIUM (PORCINE) LOCK FLUSH IV SOLN 100 UNIT/ML 100 UNIT/ML
500 SOLUTION INTRAVENOUS AS NEEDED
Status: CANCELLED | OUTPATIENT
Start: 2021-12-06

## 2021-12-06 RX ORDER — SODIUM CHLORIDE 0.9 % (FLUSH) 0.9 %
10 SYRINGE (ML) INJECTION AS NEEDED
Status: CANCELLED | OUTPATIENT
Start: 2021-12-06

## 2021-12-06 RX ORDER — HEPARIN SODIUM (PORCINE) LOCK FLUSH IV SOLN 100 UNIT/ML 100 UNIT/ML
500 SOLUTION INTRAVENOUS AS NEEDED
Status: DISCONTINUED | OUTPATIENT
Start: 2021-12-06 | End: 2021-12-07 | Stop reason: HOSPADM

## 2021-12-06 RX ADMIN — SODIUM CHLORIDE 1000 ML: 9 INJECTION, SOLUTION INTRAVENOUS at 15:00

## 2021-12-06 RX ADMIN — HEPARIN 500 UNITS: 100 SYRINGE at 16:01

## 2021-12-08 LAB
CYTO UR: NORMAL
CYTO UR: NORMAL
LAB AP ASPIRATE SMEAR: NORMAL
LAB AP CASE REPORT: NORMAL
LAB AP CASE REPORT: NORMAL
LAB AP CLINICAL INFORMATION: NORMAL
LAB AP CLINICAL INFORMATION: NORMAL
LAB AP CLOT SECTION: NORMAL
LAB AP CORE BIOPSY: NORMAL
LAB AP CYTOGENETICS REPORT,ADDENDUM: NORMAL
LAB AP DIAGNOSIS COMMENT: NORMAL
LAB AP DIAGNOSIS COMMENT: NORMAL
LAB AP FLOW CYTOMETRY SUMMARY: NORMAL
LAB AP FLOW CYTOMETRY SUMMARY: NORMAL
LAB AP INTEGRATED ONCOLOGY, ADDENDUM: NORMAL
LAB AP SPECIAL STAINS: NORMAL
PATH REPORT.FINAL DX SPEC: NORMAL
PATH REPORT.FINAL DX SPEC: NORMAL
PATH REPORT.GROSS SPEC: NORMAL
PATH REPORT.GROSS SPEC: NORMAL

## 2021-12-10 ENCOUNTER — HOSPITAL ENCOUNTER (OUTPATIENT)
Dept: ONCOLOGY | Facility: HOSPITAL | Age: 76
Setting detail: INFUSION SERIES
Discharge: HOME | End: 2021-12-10

## 2021-12-10 ENCOUNTER — TELEPHONE (OUTPATIENT)
Dept: ONCOLOGY | Facility: CLINIC | Age: 76
End: 2021-12-10

## 2021-12-10 VITALS
RESPIRATION RATE: 18 BRPM | HEART RATE: 80 BPM | BODY MASS INDEX: 22.63 KG/M2 | TEMPERATURE: 97.4 F | WEIGHT: 136 LBS | DIASTOLIC BLOOD PRESSURE: 63 MMHG | SYSTOLIC BLOOD PRESSURE: 123 MMHG

## 2021-12-10 DIAGNOSIS — C83.33 DIFFUSE LARGE B-CELL LYMPHOMA OF INTRA-ABDOMINAL LYMPH NODES (HCC): Primary | ICD-10-CM

## 2021-12-10 DIAGNOSIS — Z45.2 ENCOUNTER FOR CENTRAL LINE CARE: ICD-10-CM

## 2021-12-10 LAB
ABO GROUP BLD: NORMAL
ABO GROUP BLD: NORMAL
ALBUMIN SERPL-MCNC: 2.7 G/DL (ref 3.5–5.2)
ALBUMIN/GLOB SERPL: 1.2 G/DL
ALP SERPL-CCNC: 65 U/L (ref 39–117)
ALT SERPL W P-5'-P-CCNC: 10 U/L (ref 1–33)
ANION GAP SERPL CALCULATED.3IONS-SCNC: 6 MMOL/L (ref 5–15)
AST SERPL-CCNC: 19 U/L (ref 1–32)
BASOPHILS # BLD AUTO: 0 10*3/MM3 (ref 0–0.2)
BASOPHILS NFR BLD AUTO: 0 % (ref 0–1.5)
BILIRUB SERPL-MCNC: 0.9 MG/DL (ref 0–1.2)
BLD GP AB SCN SERPL QL: NEGATIVE
BUN SERPL-MCNC: 22 MG/DL (ref 8–23)
BUN/CREAT SERPL: 36.1 (ref 7–25)
CALCIUM SPEC-SCNC: 8 MG/DL (ref 8.6–10.5)
CHLORIDE SERPL-SCNC: 106 MMOL/L (ref 98–107)
CO2 SERPL-SCNC: 30 MMOL/L (ref 22–29)
CREAT SERPL-MCNC: 0.61 MG/DL (ref 0.57–1)
DEPRECATED RDW RBC AUTO: 47.8 FL (ref 37–54)
EOSINOPHIL # BLD AUTO: 0.03 10*3/MM3 (ref 0–0.4)
EOSINOPHIL NFR BLD AUTO: 11.5 % (ref 0.3–6.2)
ERYTHROCYTE [DISTWIDTH] IN BLOOD BY AUTOMATED COUNT: 15 % (ref 12.3–15.4)
GFR SERPL CREATININE-BSD FRML MDRD: 95 ML/MIN/1.73
GLOBULIN UR ELPH-MCNC: 2.3 GM/DL
GLUCOSE SERPL-MCNC: 98 MG/DL (ref 65–99)
HCT VFR BLD AUTO: 18.4 % (ref 34–46.6)
HGB BLD-MCNC: 6.2 G/DL (ref 12–15.9)
IMM GRANULOCYTES # BLD AUTO: 0.03 10*3/MM3 (ref 0–0.05)
IMM GRANULOCYTES NFR BLD AUTO: 11.5 % (ref 0–0.5)
LYMPHOCYTES # BLD AUTO: 0.14 10*3/MM3 (ref 0.7–3.1)
LYMPHOCYTES NFR BLD AUTO: 53.8 % (ref 19.6–45.3)
MCH RBC QN AUTO: 30.2 PG (ref 26.6–33)
MCHC RBC AUTO-ENTMCNC: 33.7 G/DL (ref 31.5–35.7)
MCV RBC AUTO: 89.8 FL (ref 79–97)
MONOCYTES # BLD AUTO: 0.02 10*3/MM3 (ref 0.1–0.9)
MONOCYTES NFR BLD AUTO: 7.7 % (ref 5–12)
NEUTROPHILS NFR BLD AUTO: 0.04 10*3/MM3 (ref 1.7–7)
NEUTROPHILS NFR BLD AUTO: 15.5 % (ref 42.7–76)
NRBC BLD AUTO-RTO: 0 /100 WBC (ref 0–0.2)
PLATELET # BLD AUTO: 35 10*3/MM3 (ref 140–450)
PMV BLD AUTO: 11.7 FL (ref 6–12)
POTASSIUM SERPL-SCNC: 2.9 MMOL/L (ref 3.5–5.2)
PROT SERPL-MCNC: 5 G/DL (ref 6–8.5)
RBC # BLD AUTO: 2.05 10*6/MM3 (ref 3.77–5.28)
RH BLD: POSITIVE
RH BLD: POSITIVE
SODIUM SERPL-SCNC: 142 MMOL/L (ref 136–145)
T&S EXPIRATION DATE: NORMAL
URATE SERPL-MCNC: 1.5 MG/DL (ref 2.4–5.7)
WBC NRBC COR # BLD: 0.26 10*3/MM3 (ref 3.4–10.8)

## 2021-12-10 PROCEDURE — 86900 BLOOD TYPING SEROLOGIC ABO: CPT

## 2021-12-10 PROCEDURE — 36430 TRANSFUSION BLD/BLD COMPNT: CPT

## 2021-12-10 PROCEDURE — 86901 BLOOD TYPING SEROLOGIC RH(D): CPT | Performed by: INTERNAL MEDICINE

## 2021-12-10 PROCEDURE — 86900 BLOOD TYPING SEROLOGIC ABO: CPT | Performed by: INTERNAL MEDICINE

## 2021-12-10 PROCEDURE — 84550 ASSAY OF BLOOD/URIC ACID: CPT | Performed by: INTERNAL MEDICINE

## 2021-12-10 PROCEDURE — 86923 COMPATIBILITY TEST ELECTRIC: CPT

## 2021-12-10 PROCEDURE — 86901 BLOOD TYPING SEROLOGIC RH(D): CPT

## 2021-12-10 PROCEDURE — 86850 RBC ANTIBODY SCREEN: CPT | Performed by: INTERNAL MEDICINE

## 2021-12-10 PROCEDURE — 85025 COMPLETE CBC W/AUTO DIFF WBC: CPT | Performed by: INTERNAL MEDICINE

## 2021-12-10 PROCEDURE — 96375 TX/PRO/DX INJ NEW DRUG ADDON: CPT

## 2021-12-10 PROCEDURE — 96361 HYDRATE IV INFUSION ADD-ON: CPT

## 2021-12-10 PROCEDURE — 25010000002 HEPARIN LOCK FLUSH PER 10 UNITS: Performed by: INTERNAL MEDICINE

## 2021-12-10 PROCEDURE — 96374 THER/PROPH/DIAG INJ IV PUSH: CPT

## 2021-12-10 PROCEDURE — 96360 HYDRATION IV INFUSION INIT: CPT

## 2021-12-10 PROCEDURE — P9016 RBC LEUKOCYTES REDUCED: HCPCS

## 2021-12-10 PROCEDURE — 25010000002 FUROSEMIDE PER 20 MG: Performed by: INTERNAL MEDICINE

## 2021-12-10 PROCEDURE — 80053 COMPREHEN METABOLIC PANEL: CPT | Performed by: INTERNAL MEDICINE

## 2021-12-10 RX ORDER — FUROSEMIDE 10 MG/ML
20 INJECTION INTRAMUSCULAR; INTRAVENOUS ONCE
Status: CANCELLED | OUTPATIENT
Start: 2021-12-10 | End: 2021-12-10

## 2021-12-10 RX ORDER — DIPHENHYDRAMINE HCL 12.5MG/5ML
12.5 LIQUID (ML) ORAL ONCE
Status: COMPLETED | OUTPATIENT
Start: 2021-12-10 | End: 2021-12-10

## 2021-12-10 RX ORDER — SODIUM CHLORIDE 9 MG/ML
250 INJECTION, SOLUTION INTRAVENOUS AS NEEDED
Status: CANCELLED | OUTPATIENT
Start: 2021-12-10

## 2021-12-10 RX ORDER — FUROSEMIDE 10 MG/ML
20 INJECTION INTRAMUSCULAR; INTRAVENOUS ONCE
Status: COMPLETED | OUTPATIENT
Start: 2021-12-10 | End: 2021-12-10

## 2021-12-10 RX ORDER — FLUCONAZOLE 100 MG/1
100 TABLET ORAL DAILY
Qty: 5 TABLET | Refills: 0 | Status: SHIPPED | OUTPATIENT
Start: 2021-12-10 | End: 2022-02-16

## 2021-12-10 RX ORDER — DIPHENHYDRAMINE HCL 25 MG
12.5 TABLET ORAL ONCE
Status: CANCELLED | OUTPATIENT
Start: 2021-12-10 | End: 2021-12-10

## 2021-12-10 RX ORDER — HEPARIN SODIUM (PORCINE) LOCK FLUSH IV SOLN 100 UNIT/ML 100 UNIT/ML
500 SOLUTION INTRAVENOUS AS NEEDED
Status: CANCELLED | OUTPATIENT
Start: 2021-12-10

## 2021-12-10 RX ORDER — HEPARIN SODIUM (PORCINE) LOCK FLUSH IV SOLN 100 UNIT/ML 100 UNIT/ML
500 SOLUTION INTRAVENOUS AS NEEDED
Status: DISCONTINUED | OUTPATIENT
Start: 2021-12-10 | End: 2021-12-11 | Stop reason: HOSPADM

## 2021-12-10 RX ORDER — DIPHENHYDRAMINE HCL 25 MG
12.5 TABLET ORAL ONCE
Status: DISCONTINUED | OUTPATIENT
Start: 2021-12-10 | End: 2021-12-10

## 2021-12-10 RX ORDER — ACETAMINOPHEN 325 MG/1
650 TABLET ORAL ONCE
Status: COMPLETED | OUTPATIENT
Start: 2021-12-10 | End: 2021-12-10

## 2021-12-10 RX ORDER — ACETAMINOPHEN 325 MG/1
650 TABLET ORAL ONCE
Status: CANCELLED | OUTPATIENT
Start: 2021-12-10 | End: 2021-12-10

## 2021-12-10 RX ORDER — SODIUM CHLORIDE 9 MG/ML
250 INJECTION, SOLUTION INTRAVENOUS AS NEEDED
Status: DISCONTINUED | OUTPATIENT
Start: 2021-12-10 | End: 2021-12-11 | Stop reason: HOSPADM

## 2021-12-10 RX ORDER — POTASSIUM CHLORIDE 20 MEQ/1
20 TABLET, EXTENDED RELEASE ORAL DAILY
Qty: 7 TABLET | Refills: 0 | Status: SHIPPED | OUTPATIENT
Start: 2021-12-10

## 2021-12-10 RX ORDER — SODIUM CHLORIDE 0.9 % (FLUSH) 0.9 %
10 SYRINGE (ML) INJECTION AS NEEDED
Status: CANCELLED | OUTPATIENT
Start: 2021-12-10

## 2021-12-10 RX ADMIN — HEPARIN 500 UNITS: 100 SYRINGE at 16:50

## 2021-12-10 RX ADMIN — SODIUM CHLORIDE 250 ML: 9 INJECTION, SOLUTION INTRAVENOUS at 10:50

## 2021-12-10 RX ADMIN — SODIUM CHLORIDE 1000 ML: 9 INJECTION, SOLUTION INTRAVENOUS at 07:45

## 2021-12-10 RX ADMIN — ACETAMINOPHEN 650 MG: 325 TABLET, FILM COATED ORAL at 10:35

## 2021-12-10 RX ADMIN — DIPHENHYDRAMINE HYDROCHLORIDE 12.5 MG: 25 SOLUTION ORAL at 10:35

## 2021-12-10 RX ADMIN — FUROSEMIDE 20 MG: 10 INJECTION, SOLUTION INTRAVENOUS at 13:34

## 2021-12-10 NOTE — TELEPHONE ENCOUNTER
Critical Test Results      MD: Himanshu Geller MD    Date: 12/10/2021     Critical test result: WBC 0.3, Hgb 6, Plt 30     All of these have dropped significantly from her lab draw on Monday    Time results received:0842

## 2021-12-10 NOTE — TELEPHONE ENCOUNTER
Received call from infusion that patients potassium is 2.9 today.  She could not remember if she was taking potassium.  I called her son Nathan and he looked and she is not.  Per Dr. Al she is to eat a bananna a day and we will send in potassium 20meq daily x 7 days.  Script sent to her local pharmacy and infusion nurse notified family.

## 2021-12-10 NOTE — TELEPHONE ENCOUNTER
Name of Physician notified: Karis Ferrer, aprn    Time Physician Notified:       [x]  Orders received Per Karis Ferrer transfuse 2 units prbc.  Janey in infusion advised patient also has thrush.  Per karis send in diflucan 100mg x 5 days as well as nystatin 5ml swish and swallow for 10 days.  Discussed ANC of 40 with Dr. Al  - no new orders.  Advised Janey to go over neutropenic precautions with patient  .  []  Protocol/Standing orders followed    []  No new orders

## 2021-12-11 LAB
BH BB BLOOD EXPIRATION DATE: NORMAL
BH BB BLOOD EXPIRATION DATE: NORMAL
BH BB BLOOD TYPE BARCODE: 5100
BH BB BLOOD TYPE BARCODE: 5100
BH BB DISPENSE STATUS: NORMAL
BH BB DISPENSE STATUS: NORMAL
BH BB PRODUCT CODE: NORMAL
BH BB PRODUCT CODE: NORMAL
BH BB UNIT NUMBER: NORMAL
BH BB UNIT NUMBER: NORMAL
CROSSMATCH INTERPRETATION: NORMAL
CROSSMATCH INTERPRETATION: NORMAL
UNIT  ABO: NORMAL
UNIT  ABO: NORMAL
UNIT  RH: NORMAL
UNIT  RH: NORMAL

## 2021-12-13 ENCOUNTER — INFUSION (OUTPATIENT)
Dept: ONCOLOGY | Facility: HOSPITAL | Age: 76
End: 2021-12-13

## 2021-12-13 ENCOUNTER — TELEPHONE (OUTPATIENT)
Dept: ONCOLOGY | Facility: CLINIC | Age: 76
End: 2021-12-13

## 2021-12-13 VITALS
HEART RATE: 83 BPM | DIASTOLIC BLOOD PRESSURE: 52 MMHG | TEMPERATURE: 96.8 F | RESPIRATION RATE: 18 BRPM | SYSTOLIC BLOOD PRESSURE: 98 MMHG

## 2021-12-13 DIAGNOSIS — C83.33 DIFFUSE LARGE B-CELL LYMPHOMA OF INTRA-ABDOMINAL LYMPH NODES (HCC): Primary | ICD-10-CM

## 2021-12-13 DIAGNOSIS — Z45.2 ENCOUNTER FOR CENTRAL LINE CARE: ICD-10-CM

## 2021-12-13 PROCEDURE — 96361 HYDRATE IV INFUSION ADD-ON: CPT

## 2021-12-13 PROCEDURE — 96360 HYDRATION IV INFUSION INIT: CPT

## 2021-12-13 PROCEDURE — 25010000002 HEPARIN LOCK FLUSH PER 10 UNITS: Performed by: INTERNAL MEDICINE

## 2021-12-13 RX ORDER — SODIUM CHLORIDE 0.9 % (FLUSH) 0.9 %
10 SYRINGE (ML) INJECTION AS NEEDED
Status: CANCELLED | OUTPATIENT
Start: 2021-12-13

## 2021-12-13 RX ORDER — HEPARIN SODIUM (PORCINE) LOCK FLUSH IV SOLN 100 UNIT/ML 100 UNIT/ML
500 SOLUTION INTRAVENOUS AS NEEDED
Status: CANCELLED | OUTPATIENT
Start: 2021-12-13

## 2021-12-13 RX ORDER — HEPARIN SODIUM (PORCINE) LOCK FLUSH IV SOLN 100 UNIT/ML 100 UNIT/ML
500 SOLUTION INTRAVENOUS AS NEEDED
Status: DISCONTINUED | OUTPATIENT
Start: 2021-12-13 | End: 2021-12-13 | Stop reason: HOSPADM

## 2021-12-13 RX ORDER — CIPROFLOXACIN 500 MG/1
500 TABLET, FILM COATED ORAL 2 TIMES DAILY
Qty: 10 TABLET | Refills: 0 | Status: SHIPPED | OUTPATIENT
Start: 2021-12-13 | End: 2022-01-17

## 2021-12-13 RX ADMIN — SODIUM CHLORIDE 1000 ML: 9 INJECTION, SOLUTION INTRAVENOUS at 13:31

## 2021-12-13 RX ADMIN — HEPARIN 500 UNITS: 100 SYRINGE at 15:32

## 2021-12-13 NOTE — TELEPHONE ENCOUNTER
Spoke with Sara and she is getting patient scheduled for the [facility] location on Wednesday and letting her know before she leaves there today.

## 2021-12-13 NOTE — TELEPHONE ENCOUNTER
Caller: CRISTHIAN LIZARRAGA    Relationship: Emergency Contact    Best call back number: [phone]    What was the call regarding: CRISTHIAN CALLED TO SAY THAT OVER THE WEEKEND, UNIQUE HAS NOT BEEN EATING OR TAKING FLUIDS. SHE ALSO HAD A SLIGHT HIGH TEMPERATURE AND HER URINE HAS A STRONG ODOR. HE SAYS SHE IS ONLY USING THE BATHROOM TWICE A DAY. HE WOULD LIKE A CALL TO DISCUSS.    Do you require a callback: YES

## 2021-12-13 NOTE — TELEPHONE ENCOUNTER
Called and spoke with Nathan and his wife about patients symptoms.  He advised yesterday her temp was 99.2 but had not happened again.  She has only drank about 30oz of water over the weekend.  Her urine output is decreased with foul odor but no burning.  Discussed with Dr. Bolanos who advised to bring in for 1 liter iv fluids today (set up at [facility] at 1:30 today).  Advised him the office would call with an appt for Wednesday as I am not sure which location can work her in on Wednesday for IV fluiids.  Per Dr. Bolanos no UA needed but to send in cipro 500 BID x 5 days.

## 2021-12-14 ENCOUNTER — TELEPHONE (OUTPATIENT)
Dept: ONCOLOGY | Facility: CLINIC | Age: 76
End: 2021-12-14

## 2021-12-14 ENCOUNTER — TELEPHONE (OUTPATIENT)
Dept: ONCOLOGY | Facility: HOSPITAL | Age: 76
End: 2021-12-14

## 2021-12-14 NOTE — TELEPHONE ENCOUNTER
Caller: CRISTHIAN LIZARRAGA    Relationship: Emergency Contact    Best call back number: [phone]    What was the call regarding: CRISTHIAN CALLED TO SAY THAT FOR THE LAST TWO DAYS, UNIQUE HAS HAD TROUBLE TAKING ALL OF HER MEDICATIONS. HE SAYS THAT EVERY TIME SHE GOES TO TAKE THEM, SHE STARTS GAGGING. THEY MANAGED TO GET THEM DOWN THE NIGHT BEFORE YESTERDAY, BUT LAST NIGHT, THEY COULDN'T GET HER TO TAKE ANY OF THEM. THEY WANT A CALL BACK TO DISCUSS WHAT THEY SHOULD DO.    Do you require a callback: YES

## 2021-12-14 NOTE — TELEPHONE ENCOUNTER
Returned Nathan's call and advised that she is having trouble getting the potassium down so he crushed it and put it in applesauce.  I advised not to crush the potassium and to dissolve it in water.  I suggested that they try to take her odt zofran 30-45 minutes prior to taking any medications to see if that helps.  She denies mouth sores and throat is not sore as I was worried maybe where she recently had thrush that it would be contributing to her having issues with this.  I advised I would reach out to our pharmacy to see if they can elaborate with him on any of her meds that can be crushed, cut, etc.

## 2021-12-14 NOTE — TELEPHONE ENCOUNTER
I left a voicemail for the patient's sonTerry in reply to the question about what medications can be crushed since the patient is having trouble swallowing with the following information:    1. Dr. Al was okay with stopping the allopurinol.    2. There are very few medications on her list that can be crushed.  One is the hydrochlorothiazide which can be crushed and sprinkled on 1 teaspoon of soft food (applesauce, yogurt, pudding) and then swallowed within 1 hour.    3. The metoprolol succinate XL can be split in to smaller pieces, but should not be crushed  4. The other medications on her list should not be crushed or chewed.  Dr. Al expects her difficulty swallowing to improve and recommended they reach out to the prescribing physicians if they wanted any of the other medications switched to other formulations.  I let Nathan know that mirtazepine comes in an oral disintegrating tablet and several of the other medications come in liquid formulations

## 2021-12-15 ENCOUNTER — INFUSION (OUTPATIENT)
Dept: ONCOLOGY | Facility: HOSPITAL | Age: 76
End: 2021-12-15

## 2021-12-15 VITALS
DIASTOLIC BLOOD PRESSURE: 72 MMHG | RESPIRATION RATE: 16 BRPM | TEMPERATURE: 98.7 F | SYSTOLIC BLOOD PRESSURE: 108 MMHG | HEART RATE: 108 BPM

## 2021-12-15 DIAGNOSIS — Z45.2 ENCOUNTER FOR CENTRAL LINE CARE: ICD-10-CM

## 2021-12-15 DIAGNOSIS — C83.33 DIFFUSE LARGE B-CELL LYMPHOMA OF INTRA-ABDOMINAL LYMPH NODES (HCC): Primary | ICD-10-CM

## 2021-12-15 PROCEDURE — 96361 HYDRATE IV INFUSION ADD-ON: CPT

## 2021-12-15 PROCEDURE — 25010000002 HEPARIN LOCK FLUSH PER 10 UNITS: Performed by: INTERNAL MEDICINE

## 2021-12-15 PROCEDURE — 96360 HYDRATION IV INFUSION INIT: CPT

## 2021-12-15 RX ORDER — SODIUM CHLORIDE 0.9 % (FLUSH) 0.9 %
10 SYRINGE (ML) INJECTION AS NEEDED
Status: CANCELLED | OUTPATIENT
Start: 2021-12-15

## 2021-12-15 RX ORDER — SODIUM CHLORIDE 0.9 % (FLUSH) 0.9 %
10 SYRINGE (ML) INJECTION AS NEEDED
Status: DISCONTINUED | OUTPATIENT
Start: 2021-12-15 | End: 2021-12-15 | Stop reason: HOSPADM

## 2021-12-15 RX ORDER — HEPARIN SODIUM (PORCINE) LOCK FLUSH IV SOLN 100 UNIT/ML 100 UNIT/ML
500 SOLUTION INTRAVENOUS AS NEEDED
Status: CANCELLED | OUTPATIENT
Start: 2021-12-15

## 2021-12-15 RX ORDER — HEPARIN SODIUM (PORCINE) LOCK FLUSH IV SOLN 100 UNIT/ML 100 UNIT/ML
500 SOLUTION INTRAVENOUS AS NEEDED
Status: DISCONTINUED | OUTPATIENT
Start: 2021-12-15 | End: 2021-12-15 | Stop reason: HOSPADM

## 2021-12-15 RX ADMIN — SODIUM CHLORIDE, PRESERVATIVE FREE 10 ML: 5 INJECTION INTRAVENOUS at 15:49

## 2021-12-15 RX ADMIN — SODIUM CHLORIDE 1000 ML: 9 INJECTION, SOLUTION INTRAVENOUS at 13:47

## 2021-12-15 RX ADMIN — HEPARIN 500 UNITS: 100 SYRINGE at 15:49

## 2021-12-16 ENCOUNTER — DOCUMENTATION (OUTPATIENT)
Dept: NUTRITION | Facility: HOSPITAL | Age: 76
End: 2021-12-16

## 2021-12-16 DIAGNOSIS — R11.2 INTRACTABLE VOMITING WITH NAUSEA, UNSPECIFIED VOMITING TYPE: ICD-10-CM

## 2021-12-16 DIAGNOSIS — C83.33 DIFFUSE LARGE B-CELL LYMPHOMA OF INTRA-ABDOMINAL LYMPH NODES (HCC): Primary | ICD-10-CM

## 2021-12-16 NOTE — TELEPHONE ENCOUNTER
Caller: CRISTHIAN LIZARRAGA    Relationship: Emergency Contact    Best call back number: [phone]    What is the best time to reach you: ANYTIME    Who are you requesting to speak with (clinical staff, provider,  specific staff member):CLINICAL      What was the call regarding: PATIENT SON CONTACTING OFFICE TO VERIFY WHEN NEXT APPOINTMENT FOR FLUIDS MAY NEED TO BE.    PATIENT IS STILL EATING AND DRINKING MINIMALLY AND VERY WEAK.    PATIENT SON REQUESTING NEXT STEPS AND WHAT CARE CAN BE PROVIDED AT HOME.    Do you require a callback: YES

## 2021-12-16 NOTE — PROGRESS NOTES
ONC Nutrition    Diagnosis:  Normocytic anemia / Bulky lymphadenopathy - large mass next to the left kidney     Chemotherapy: R-CHOP - 6 cycles / 21 day cycle - start date 12/3/21    Weight 136 lbs.    Nutrition Diagnosis      Problem Severe malnutrition in context of acute illness   Etiology Diagnosis   Signs / Symptoms Poor appetite with inadequate oral intake  23 lbs unintentional weight loss past 3 months (15%)  Visual fat and muscle wasting     Referral received from Evette Pyle RN regarding continued  poor oral intake / hydration; patient's son had called with concern and need for IVFs.  Her weight shows a 4 lbs weight increase over the past 2 weeks which may be reflective of the prednisone.     I would suggest an appetite stimulant to try and jump start her appetite.  Her family is very supportive and gets her whatever she wants, but she only takes a few bites and then she is done with it.  I had provided her with samples of ONS with suggestions for flavor and nutrient enhancement, but she hasn't cared for those either.  She doesn't care for anything that has a flavor.  Family keeps ice at her bedside constantly, as that is about the extent of her hydration.  We discussed various suggestions for power packing and foods that may be more appealing, but he states she will take 1-2 bites and be finished with it.    Note sent to Dr. Al's office with suggestion for appetite stimulant.

## 2021-12-16 NOTE — TELEPHONE ENCOUNTER
Called and spoke with Nathan.  He advised that the fluids have seemed to help her but he is not sure when he should request for her to come back in.  I explained we would preferably like her to be able to get fluids in orally and to encourage her to push fluids.  I advised if she has poor urine output, dehydration, diarrhea, n/v uncontrolled by her medication would be a time to call me as we would need to assess her output and determine if needed to come in for fluids.  He felt she was ok today and did not need anything and just wanted guidance on what to watch for.  I advised him if he saw any changes this evening into tomorrow to call me early so we can get her worked in before the weekend in case she needs anything.  He also asked to speak with dietician.  Advised I would send message to them.

## 2021-12-17 RX ORDER — MEGESTROL ACETATE 40 MG/ML
800 SUSPENSION ORAL DAILY
Qty: 240 ML | Refills: 1 | Status: SHIPPED | OUTPATIENT
Start: 2021-12-17 | End: 2022-01-10

## 2021-12-17 NOTE — TELEPHONE ENCOUNTER
Discussed with Karis.  She advised that we would send in Megace for patient.  Notified Nathan that we would send it in.  He verbalized understanding.

## 2021-12-27 ENCOUNTER — OFFICE VISIT (OUTPATIENT)
Dept: ONCOLOGY | Facility: CLINIC | Age: 76
End: 2021-12-27

## 2021-12-27 ENCOUNTER — HOSPITAL ENCOUNTER (OUTPATIENT)
Dept: ONCOLOGY | Facility: HOSPITAL | Age: 76
Setting detail: INFUSION SERIES
Discharge: HOME | End: 2021-12-27

## 2021-12-27 VITALS — HEART RATE: 80 BPM | SYSTOLIC BLOOD PRESSURE: 99 MMHG | DIASTOLIC BLOOD PRESSURE: 44 MMHG

## 2021-12-27 VITALS
SYSTOLIC BLOOD PRESSURE: 106 MMHG | DIASTOLIC BLOOD PRESSURE: 67 MMHG | WEIGHT: 137.8 LBS | HEART RATE: 93 BPM | BODY MASS INDEX: 22.96 KG/M2 | HEIGHT: 65 IN | RESPIRATION RATE: 18 BRPM | TEMPERATURE: 96.3 F

## 2021-12-27 DIAGNOSIS — L65.9 ALOPECIA: ICD-10-CM

## 2021-12-27 DIAGNOSIS — C83.33 DIFFUSE LARGE B-CELL LYMPHOMA OF INTRA-ABDOMINAL LYMPH NODES (HCC): Primary | ICD-10-CM

## 2021-12-27 DIAGNOSIS — Z45.2 ENCOUNTER FOR CENTRAL LINE CARE: ICD-10-CM

## 2021-12-27 DIAGNOSIS — Z51.11 ENCOUNTER FOR ANTINEOPLASTIC CHEMOTHERAPY: ICD-10-CM

## 2021-12-27 LAB
ALBUMIN SERPL-MCNC: 2.9 G/DL (ref 3.5–5.2)
ALBUMIN/GLOB SERPL: 1.1 G/DL
ALP SERPL-CCNC: 69 U/L (ref 39–117)
ALT SERPL W P-5'-P-CCNC: 7 U/L (ref 1–33)
ANION GAP SERPL CALCULATED.3IONS-SCNC: 7 MMOL/L (ref 5–15)
AST SERPL-CCNC: 19 U/L (ref 1–32)
BILIRUB SERPL-MCNC: 0.2 MG/DL (ref 0–1.2)
BUN SERPL-MCNC: 9 MG/DL (ref 8–23)
BUN/CREAT SERPL: 12.7 (ref 7–25)
CALCIUM SPEC-SCNC: 8.5 MG/DL (ref 8.6–10.5)
CHLORIDE SERPL-SCNC: 108 MMOL/L (ref 98–107)
CO2 SERPL-SCNC: 24 MMOL/L (ref 22–29)
CREAT SERPL-MCNC: 0.71 MG/DL (ref 0.57–1)
ERYTHROCYTE [DISTWIDTH] IN BLOOD BY AUTOMATED COUNT: 21.6 % (ref 12.3–15.4)
GFR SERPL CREATININE-BSD FRML MDRD: 80 ML/MIN/1.73
GLOBULIN UR ELPH-MCNC: 2.6 GM/DL
GLUCOSE SERPL-MCNC: 79 MG/DL (ref 65–99)
HCT VFR BLD AUTO: 25.2 % (ref 34–46.6)
HGB BLD-MCNC: 8.3 G/DL (ref 12–15.9)
LYMPHOCYTES # BLD AUTO: 1.3 10*3/MM3 (ref 0.7–3.1)
LYMPHOCYTES NFR BLD AUTO: 11.1 % (ref 19.6–45.3)
MCH RBC QN AUTO: 30.3 PG (ref 26.6–33)
MCHC RBC AUTO-ENTMCNC: 32.8 G/DL (ref 31.5–35.7)
MCV RBC AUTO: 92.4 FL (ref 79–97)
MONOCYTES # BLD AUTO: 0.8 10*3/MM3 (ref 0.1–0.9)
MONOCYTES NFR BLD AUTO: 6.8 % (ref 5–12)
NEUTROPHILS NFR BLD AUTO: 82.1 % (ref 42.7–76)
NEUTROPHILS NFR BLD AUTO: 9.5 10*3/MM3 (ref 1.7–7)
PLATELET # BLD AUTO: 388 10*3/MM3 (ref 140–450)
PMV BLD AUTO: 9.2 FL (ref 6–12)
POTASSIUM SERPL-SCNC: 4.6 MMOL/L (ref 3.5–5.2)
PROT SERPL-MCNC: 5.5 G/DL (ref 6–8.5)
RBC # BLD AUTO: 2.73 10*6/MM3 (ref 3.77–5.28)
SODIUM SERPL-SCNC: 139 MMOL/L (ref 136–145)
WBC NRBC COR # BLD: 11.6 10*3/MM3 (ref 3.4–10.8)

## 2021-12-27 PROCEDURE — 25010000002 DIPHENHYDRAMINE PER 50 MG: Performed by: INTERNAL MEDICINE

## 2021-12-27 PROCEDURE — 25010000002 DEXAMETHASONE SODIUM PHOSPHATE 100 MG/10ML SOLUTION: Performed by: INTERNAL MEDICINE

## 2021-12-27 PROCEDURE — 25010000002 PALONOSETRON 0.25 MG/5ML SOLUTION PREFILLED SYRINGE: Performed by: INTERNAL MEDICINE

## 2021-12-27 PROCEDURE — 25010000002 VINCRISTINE PER 1 MG: Performed by: INTERNAL MEDICINE

## 2021-12-27 PROCEDURE — 25010000002 HEPARIN LOCK FLUSH PER 10 UNITS: Performed by: INTERNAL MEDICINE

## 2021-12-27 PROCEDURE — 25010000002 PEGFILGRASTIM 6 MG/0.6ML PREFILLED SYRINGE KIT: Performed by: INTERNAL MEDICINE

## 2021-12-27 PROCEDURE — 25010000002 RITUXIMAB 10 MG/ML SOLUTION 10 ML VIAL: Performed by: INTERNAL MEDICINE

## 2021-12-27 PROCEDURE — 25010000002 DOXORUBICIN PER 10 MG: Performed by: INTERNAL MEDICINE

## 2021-12-27 PROCEDURE — 96411 CHEMO IV PUSH ADDL DRUG: CPT

## 2021-12-27 PROCEDURE — 96377 APPLICATON ON-BODY INJECTOR: CPT

## 2021-12-27 PROCEDURE — 99215 OFFICE O/P EST HI 40 MIN: CPT | Performed by: INTERNAL MEDICINE

## 2021-12-27 PROCEDURE — 96375 TX/PRO/DX INJ NEW DRUG ADDON: CPT

## 2021-12-27 PROCEDURE — 96367 TX/PROPH/DG ADDL SEQ IV INF: CPT

## 2021-12-27 PROCEDURE — 25010000002 RITUXIMAB 10 MG/ML SOLUTION 50 ML VIAL: Performed by: INTERNAL MEDICINE

## 2021-12-27 PROCEDURE — 96413 CHEMO IV INFUSION 1 HR: CPT

## 2021-12-27 PROCEDURE — 80053 COMPREHEN METABOLIC PANEL: CPT | Performed by: INTERNAL MEDICINE

## 2021-12-27 PROCEDURE — 25010000002 FOSAPREPITANT PER 1 MG: Performed by: INTERNAL MEDICINE

## 2021-12-27 PROCEDURE — 85025 COMPLETE CBC W/AUTO DIFF WBC: CPT | Performed by: INTERNAL MEDICINE

## 2021-12-27 PROCEDURE — 96415 CHEMO IV INFUSION ADDL HR: CPT

## 2021-12-27 PROCEDURE — 96417 CHEMO IV INFUS EACH ADDL SEQ: CPT

## 2021-12-27 PROCEDURE — 25010000002 CYCLOPHOSPHAMIDE 1 GM/5ML SOLUTION 5 ML VIAL: Performed by: INTERNAL MEDICINE

## 2021-12-27 RX ORDER — DOXORUBICIN HYDROCHLORIDE 2 MG/ML
50 INJECTION, SOLUTION INTRAVENOUS ONCE
Status: CANCELLED | OUTPATIENT
Start: 2022-01-17

## 2021-12-27 RX ORDER — HEPARIN SODIUM (PORCINE) LOCK FLUSH IV SOLN 100 UNIT/ML 100 UNIT/ML
500 SOLUTION INTRAVENOUS AS NEEDED
Status: DISCONTINUED | OUTPATIENT
Start: 2021-12-27 | End: 2021-12-28 | Stop reason: HOSPADM

## 2021-12-27 RX ORDER — PALONOSETRON 0.05 MG/ML
0.25 INJECTION, SOLUTION INTRAVENOUS ONCE
Status: CANCELLED | OUTPATIENT
Start: 2022-01-17

## 2021-12-27 RX ORDER — SODIUM CHLORIDE 9 MG/ML
250 INJECTION, SOLUTION INTRAVENOUS AS NEEDED
Status: CANCELLED | OUTPATIENT
Start: 2021-12-27

## 2021-12-27 RX ORDER — DIPHENHYDRAMINE HYDROCHLORIDE 50 MG/ML
50 INJECTION INTRAMUSCULAR; INTRAVENOUS AS NEEDED
Status: CANCELLED | OUTPATIENT
Start: 2022-01-17

## 2021-12-27 RX ORDER — MEPERIDINE HYDROCHLORIDE 25 MG/ML
25 INJECTION INTRAMUSCULAR; INTRAVENOUS; SUBCUTANEOUS
Status: CANCELLED | OUTPATIENT
Start: 2022-01-17

## 2021-12-27 RX ORDER — ACETAMINOPHEN 325 MG/1
650 TABLET ORAL ONCE
Status: CANCELLED | OUTPATIENT
Start: 2022-01-17

## 2021-12-27 RX ORDER — OLANZAPINE 5 MG/1
5 TABLET ORAL ONCE
Status: COMPLETED | OUTPATIENT
Start: 2021-12-27 | End: 2021-12-27

## 2021-12-27 RX ORDER — SODIUM CHLORIDE 9 MG/ML
250 INJECTION, SOLUTION INTRAVENOUS ONCE
Status: CANCELLED | OUTPATIENT
Start: 2022-01-17

## 2021-12-27 RX ORDER — PALONOSETRON 0.05 MG/ML
0.25 INJECTION, SOLUTION INTRAVENOUS ONCE
Status: COMPLETED | OUTPATIENT
Start: 2021-12-27 | End: 2021-12-27

## 2021-12-27 RX ORDER — ACETAMINOPHEN 325 MG/1
650 TABLET ORAL ONCE
Status: CANCELLED | OUTPATIENT
Start: 2021-12-27 | End: 2021-12-27

## 2021-12-27 RX ORDER — DIPHENHYDRAMINE HCL 25 MG
25 CAPSULE ORAL ONCE
Status: CANCELLED | OUTPATIENT
Start: 2021-12-27 | End: 2021-12-27

## 2021-12-27 RX ORDER — OLANZAPINE 5 MG/1
5 TABLET ORAL ONCE
Status: CANCELLED | OUTPATIENT
Start: 2022-01-17

## 2021-12-27 RX ORDER — ACETAMINOPHEN 325 MG/1
650 TABLET ORAL ONCE
Status: COMPLETED | OUTPATIENT
Start: 2021-12-27 | End: 2021-12-27

## 2021-12-27 RX ORDER — DOXORUBICIN HYDROCHLORIDE 2 MG/ML
50 INJECTION, SOLUTION INTRAVENOUS ONCE
Status: COMPLETED | OUTPATIENT
Start: 2021-12-27 | End: 2021-12-27

## 2021-12-27 RX ORDER — SODIUM CHLORIDE 0.9 % (FLUSH) 0.9 %
10 SYRINGE (ML) INJECTION AS NEEDED
Status: CANCELLED | OUTPATIENT
Start: 2021-12-27

## 2021-12-27 RX ORDER — FAMOTIDINE 10 MG/ML
20 INJECTION, SOLUTION INTRAVENOUS AS NEEDED
Status: CANCELLED | OUTPATIENT
Start: 2022-01-17

## 2021-12-27 RX ORDER — HEPARIN SODIUM (PORCINE) LOCK FLUSH IV SOLN 100 UNIT/ML 100 UNIT/ML
500 SOLUTION INTRAVENOUS AS NEEDED
Status: CANCELLED | OUTPATIENT
Start: 2021-12-27

## 2021-12-27 RX ORDER — SODIUM CHLORIDE 9 MG/ML
250 INJECTION, SOLUTION INTRAVENOUS ONCE
Status: COMPLETED | OUTPATIENT
Start: 2021-12-27 | End: 2021-12-27

## 2021-12-27 RX ORDER — FUROSEMIDE 10 MG/ML
20 INJECTION INTRAMUSCULAR; INTRAVENOUS ONCE
Status: CANCELLED | OUTPATIENT
Start: 2021-12-27 | End: 2021-12-27

## 2021-12-27 RX ADMIN — SODIUM CHLORIDE 150 MG: 9 INJECTION, SOLUTION INTRAVENOUS at 10:12

## 2021-12-27 RX ADMIN — PALONOSETRON 0.25 MG: 0.25 INJECTION, SOLUTION INTRAVENOUS at 10:10

## 2021-12-27 RX ADMIN — DIPHENHYDRAMINE HYDROCHLORIDE 50 MG: 50 INJECTION INTRAMUSCULAR; INTRAVENOUS at 09:50

## 2021-12-27 RX ADMIN — RITUXIMAB 600 MG: 10 INJECTION, SOLUTION INTRAVENOUS at 10:49

## 2021-12-27 RX ADMIN — VINCRISTINE SULFATE 2 MG: 1 INJECTION, SOLUTION INTRAVENOUS at 13:02

## 2021-12-27 RX ADMIN — DEXAMETHASONE SODIUM PHOSPHATE 12 MG: 10 INJECTION, SOLUTION INTRAMUSCULAR; INTRAVENOUS at 10:12

## 2021-12-27 RX ADMIN — HEPARIN SODIUM (PORCINE) LOCK FLUSH IV SOLN 100 UNIT/ML 500 UNITS: 100 SOLUTION at 14:05

## 2021-12-27 RX ADMIN — DOXORUBICIN HYDROCHLORIDE 42 MG: 2 INJECTION, SOLUTION INTRAVENOUS at 12:52

## 2021-12-27 RX ADMIN — SODIUM CHLORIDE 250 ML: 9 INJECTION, SOLUTION INTRAVENOUS at 09:50

## 2021-12-27 RX ADMIN — CYCLOPHOSPHAMIDE 1250 MG: 200 INJECTION, SOLUTION INTRAVENOUS at 13:19

## 2021-12-27 RX ADMIN — PEGFILGRASTIM 6 MG: KIT SUBCUTANEOUS at 14:10

## 2021-12-27 RX ADMIN — ACETAMINOPHEN 650 MG: 325 TABLET, FILM COATED ORAL at 09:50

## 2021-12-27 RX ADMIN — OLANZAPINE 5 MG: 5 TABLET, FILM COATED ORAL at 10:03

## 2021-12-27 NOTE — PROGRESS NOTES
DATE OF VISIT: 12/27/2021    REASON FOR VISIT: Followup for diffuse large B-cell lymphoma     HISTORY OF PRESENT ILLNESS: The patient is a very pleasant 76 y.o. female  with past medical history significant for diffuse large B-cell lymphoma diagnosed November 2021.  The patient was started on R-CHOP chemotherapy December 3, 2021. The  patient is here today for scheduled follow-up visit with treatment cycle #2.    SUBJECTIVE: The patient is here today with her son.  She was able to tolerate cycle #1 multiple side effects.  She had nausea and vomiting.  Zofran did help little bit.  She did require IV fluid resuscitation at the infusion center.  She had anemia that required blood transfusion.    PAST MEDICAL HISTORY/SOCIAL HISTORY/FAMILY HISTORY: Reviewed by me and unchanged from my documentation done on 12/27/21.    Review of Systems   Constitutional: Negative for activity change, appetite change, chills, fatigue, fever and unexpected weight change.   HENT: Negative for hearing loss, mouth sores, nosebleeds, sore throat and trouble swallowing.    Eyes: Negative for visual disturbance.   Respiratory: Negative for cough, chest tightness, shortness of breath and wheezing.    Cardiovascular: Negative for chest pain, palpitations and leg swelling.   Gastrointestinal: Negative for abdominal distention, abdominal pain, blood in stool, constipation, diarrhea, nausea, rectal pain and vomiting.   Endocrine: Negative for cold intolerance and heat intolerance.   Genitourinary: Negative for difficulty urinating, dysuria, frequency and urgency.   Musculoskeletal: Negative for arthralgias, back pain, gait problem, joint swelling and myalgias.   Skin: Negative for rash.   Neurological: Negative for dizziness, tremors, syncope, weakness, light-headedness, numbness and headaches.   Hematological: Negative for adenopathy. Does not bruise/bleed easily.   Psychiatric/Behavioral: Negative for confusion, sleep disturbance and suicidal  ideas. The patient is not nervous/anxious.          Current Outpatient Medications:   •  allopurinol (Zyloprim) 300 MG tablet, Take 1 tablet by mouth Daily., Disp: 30 tablet, Rfl: 1  •  Apremilast (OTEZLA PO), 30 mg., Disp: , Rfl:   •  aspirin (Aspirin 81) 81 MG chewable tablet, Aspir-81, Disp: , Rfl:   •  hydroCHLOROthiazide (HYDRODIURIL) 12.5 MG tablet, , Disp: , Rfl:   •  lidocaine-prilocaine (EMLA) 2.5-2.5 % cream, Apply 1 application topically to the appropriate area as directed As Needed (45-60 minutes prior to port access.  Cover with saran/plastic wrap.)., Disp: 30 g, Rfl: 3  •  megestrol (MEGACE) 40 MG/ML suspension, Take 20 mL by mouth Daily., Disp: 240 mL, Rfl: 1  •  mirtazapine (REMERON) 15 MG tablet, Take 1 tablet by mouth Every Night., Disp: 30 tablet, Rfl: 1  •  ondansetron (ZOFRAN) 8 MG tablet, Take 1 tablet by mouth 3 (Three) Times a Day As Needed for Nausea or Vomiting., Disp: 30 tablet, Rfl: 5  •  rosuvastatin (CRESTOR) 20 MG tablet, Take 20 mg by mouth., Disp: , Rfl:   •  acetaminophen (TYLENOL) 500 MG tablet, Take 1 tablet by mouth Every 6 (Six) Hours As Needed for Mild Pain  or Moderate Pain ., Disp: , Rfl:   •  ciprofloxacin (Cipro) 500 MG tablet, Take 1 tablet by mouth 2 (Two) Times a Day., Disp: 10 tablet, Rfl: 0  •  fluconazole (DIFLUCAN) 100 MG tablet, Take 1 tablet by mouth Daily., Disp: 5 tablet, Rfl: 0  •  metoprolol succinate XL (TOPROL-XL) 25 MG 24 hr tablet, Take 1 tablet by mouth Daily., Disp: , Rfl:   •  Metoprolol-hydroCHLOROthiazide (DUTOPROL PO), metoprolol rooney-hydrochlorothiaz, Disp: , Rfl:   •  nitrofurantoin, macrocrystal-monohydrate, (MACROBID) 100 MG capsule, , Disp: , Rfl:   •  nystatin (MYCOSTATIN) 100,000 unit/mL suspension, Swish and swallow 5 mL 4 (Four) Times a Day., Disp: 200 mL, Rfl: 0  •  ondansetron ODT (Zofran ODT) 8 MG disintegrating tablet, Place 1 tablet on the tongue Every 8 (Eight) Hours As Needed for Nausea or Vomiting., Disp: 30 tablet, Rfl: 1  •  potassium  "chloride (K-DUR,KLOR-CON) 20 MEQ CR tablet, Take 1 tablet by mouth Daily., Disp: 7 tablet, Rfl: 0  •  potassium chloride 10 MEQ CR tablet, , Disp: , Rfl:   •  predniSONE (DELTASONE) 20 MG tablet, Take 60 mg daily for 2 days with food.  Taper by 20 mg every 2 days until complete, Disp: 12 tablet, Rfl: 0    PHYSICAL EXAMINATION:   /67   Pulse 93   Temp 96.3 °F (35.7 °C) (Infrared)   Resp 18   Ht 165.1 cm (65\")   Wt 62.5 kg (137 lb 12.8 oz)   BMI 22.93 kg/m²    Pain Score    12/27/21 0830   PainSc: 0-No pain        ECOG score: 3            ECOG Performance Status: 1 - Symptomatic but completely ambulatory  General Appearance:  alert, cooperative, no apparent distress and appears stated age   Neurologic/Psychiatric: A&O x 3, gait steady, appropriate affect, strength 5/5 in all muscle groups   HEENT:  Normocephalic, without obvious abnormality, mucous membranes moist   Neck: Supple, symmetrical, trachea midline, no adenopathy;  No thyromegaly, masses, or tenderness   Lungs:   Clear to auscultation bilaterally; respirations regular, even, and unlabored bilaterally   Heart:  Regular rate and rhythm, no murmurs appreciated   Abdomen:   Soft, non-tender, non-distended and no organomegaly   Lymph nodes: No cervical, supraclavicular, inguinal or axillary adenopathy noted   Extremities: Normal, atraumatic; no clubbing, cyanosis, or edema    Skin: No rashes, ulcers, or suspicious lesions noted     No visits with results within 2 Week(s) from this visit.   Latest known visit with results is:   Hospital Outpatient Visit on 12/10/2021   Component Date Value Ref Range Status   • Glucose 12/10/2021 98  65 - 99 mg/dL Final   • BUN 12/10/2021 22  8 - 23 mg/dL Final   • Creatinine 12/10/2021 0.61  0.57 - 1.00 mg/dL Final   • Sodium 12/10/2021 142  136 - 145 mmol/L Final   • Potassium 12/10/2021 2.9* 3.5 - 5.2 mmol/L Final   • Chloride 12/10/2021 106  98 - 107 mmol/L Final   • CO2 12/10/2021 30.0* 22.0 - 29.0 mmol/L Final   • " Calcium 12/10/2021 8.0* 8.6 - 10.5 mg/dL Final   • Total Protein 12/10/2021 5.0* 6.0 - 8.5 g/dL Final   • Albumin 12/10/2021 2.70* 3.50 - 5.20 g/dL Final   • ALT (SGPT) 12/10/2021 10  1 - 33 U/L Final   • AST (SGOT) 12/10/2021 19  1 - 32 U/L Final   • Alkaline Phosphatase 12/10/2021 65  39 - 117 U/L Final   • Total Bilirubin 12/10/2021 0.9  0.0 - 1.2 mg/dL Final   • eGFR Non African Amer 12/10/2021 95  >60 mL/min/1.73 Final   • Globulin 12/10/2021 2.3  gm/dL Final    Calculated Result   • A/G Ratio 12/10/2021 1.2  g/dL Final   • BUN/Creatinine Ratio 12/10/2021 36.1* 7.0 - 25.0 Final   • Anion Gap 12/10/2021 6.0  5.0 - 15.0 mmol/L Final   • Uric Acid 12/10/2021 1.5* 2.4 - 5.7 mg/dL Final    Falsely depressed results may occur on samples drawn from patients receiving N-Acetylcysteine (NAC) or Metamizole.   • WBC 12/10/2021 0.26* 3.40 - 10.80 10*3/mm3 Final   • RBC 12/10/2021 2.05* 3.77 - 5.28 10*6/mm3 Final   • Hemoglobin 12/10/2021 6.2* 12.0 - 15.9 g/dL Final   • Hematocrit 12/10/2021 18.4* 34.0 - 46.6 % Final   • MCV 12/10/2021 89.8  79.0 - 97.0 fL Final   • MCH 12/10/2021 30.2  26.6 - 33.0 pg Final   • MCHC 12/10/2021 33.7  31.5 - 35.7 g/dL Final   • RDW 12/10/2021 15.0  12.3 - 15.4 % Final   • RDW-SD 12/10/2021 47.8  37.0 - 54.0 fl Final   • MPV 12/10/2021 11.7  6.0 - 12.0 fL Final   • Platelets 12/10/2021 35* 140 - 450 10*3/mm3 Final   • Neutrophil % 12/10/2021 15.5* 42.7 - 76.0 % Final   • Lymphocyte % 12/10/2021 53.8* 19.6 - 45.3 % Final   • Monocyte % 12/10/2021 7.7  5.0 - 12.0 % Final   • Eosinophil % 12/10/2021 11.5* 0.3 - 6.2 % Final   • Basophil % 12/10/2021 0.0  0.0 - 1.5 % Final   • Immature Grans % 12/10/2021 11.5* 0.0 - 0.5 % Final   • Neutrophils, Absolute 12/10/2021 0.04* 1.70 - 7.00 10*3/mm3 Final   • Lymphocytes, Absolute 12/10/2021 0.14* 0.70 - 3.10 10*3/mm3 Final   • Monocytes, Absolute 12/10/2021 0.02* 0.10 - 0.90 10*3/mm3 Final   • Eosinophils, Absolute 12/10/2021 0.03  0.00 - 0.40 10*3/mm3  Final   • Basophils, Absolute 12/10/2021 0.00  0.00 - 0.20 10*3/mm3 Final   • Immature Grans, Absolute 12/10/2021 0.03  0.00 - 0.05 10*3/mm3 Final   • nRBC 12/10/2021 0.0  0.0 - 0.2 /100 WBC Final   • ABO Type 12/10/2021 O   Final   • RH type 12/10/2021 Positive   Final   • Antibody Screen 12/10/2021 Negative   Final   • T&S Expiration Date 12/10/2021 12/13/2021 11:59:59 PM   Final   • Product Code 12/11/2021 [id number]   Final   • Unit Number 12/11/2021 [id number]   Final   • UNIT  ABO 12/11/2021 O   Final   • UNIT  RH 12/11/2021 POS   Final   • Crossmatch Interpretation 12/11/2021 Compatible   Final   • Dispense Status 12/11/2021 PT   Final   • Blood Expiration Date 12/11/2021 202201122359   Final   • Blood Type Barcode 12/11/2021 5100   Final   • Product Code 12/11/2021 [id number]   Final   • Unit Number 12/11/2021 [id number]   Final   • UNIT  ABO 12/11/2021 O   Final   • UNIT  RH 12/11/2021 POS   Final   • Crossmatch Interpretation 12/11/2021 Compatible   Final   • Dispense Status 12/11/2021 PT   Final   • Blood Expiration Date 12/11/2021 202201122359   Final   • Blood Type Barcode 12/11/2021 5100   Final   • ABO Type 12/10/2021 O   Final   • RH type 12/10/2021 Positive   Final        NM PET/CT Skull Base to Mid Thigh    Result Date: 12/1/2021  Narrative: EXAMINATION: NM PET/CT SKULL BASE TO MID THIGH- 11/30/2021  INDICATION: R19.00-Intra-abdominal and pelvic swelling, mass and lump, unspecified site; R93.5-Abnormal findings on diagnostic imaging of other abdominal regions, including retroperitoneum  TECHNIQUE: Fasting blood glucose 105 mg/dL with radiopharmaceutical injection 12.94 mCi of F-18 FDG injected into a left AC vein with patient imaged after appropriate amount of time. CT datasets performed for fusion analysis alone and should not be used for diagnostic purposes as these are low-dose protocol.  The radiation dose reduction device was turned on for each scan per the ALARA (As Low as Reasonably  Achievable) protocol.  COMPARISON: CT biopsy 11/24/2021, CT abdomen and pelvis 11/18/2021.  FINDINGS:  HEAD AND NECK: Grossly symmetric appearance of cerebral hemispheres on limited intracranial evaluation. Physiologic activity within the extraocular muscles, muscles of phonation and tonsils without abnormal hypermetabolism superimposed at the left base of tongue region somewhat indeterminate but asymmetric and potentially thickened soft tissue maximum SUV 6.1 along with asymmetric activity of a right level 2 lymph node maximum SUV 2.9. Left supraclavicular adenopathy enlarged with intensely abnormal metabolic left supraclavicular lymph node 18.7 maximum SUV.  CHEST: Physiologic activity within the left ventricular myocardium. No abnormal hypermetabolism within the pulmonary parenchyma or chest wall with somewhat indeterminate areas of hypermetabolism right cardiac region including right atrium for example maximum SUV 4.6 similar out of proportion to expected findings given lack of intraventricular or left ventricular involvement otherwise adjacent to a right chest wall Port-A-Cath but appearing separate without clear CT nonfused correlate.  ABDOMEN AND PELVIS: Physiologic activity within the liver, spleen, renal collecting systems and GI tract. Large lobulated retroperitoneal mass lesion intensely hypermetabolic with adjacent adenopathy in the retroperitoneal region creating mass effect with intense hypermetabolism maximum SUV 23.9. Left pelvic sidewall lymph nodes asymmetrically enlarged and intensely hypermetabolic maximum SUV 21.3. Osseous structures and proximal thighs unremarkable.      Impression: Large mass occupying mass within the retroperitoneum extending leftward surrounding or enveloping the abdominal aorta intensely hypermetabolic with intensely abnormal left pelvic sidewall lymph nodes and adjacent retroperitoneal adenopathy. Intensely hypermetabolic left supraclavicular and right level 2 cervical  lymph nodes with questionable focus at the left base of tongue or floor of mouth region mildly asymmetric as well as suspicious findings in the right cardiac region adjacent to the right atrium appearing separate from the right chest wall Port-A-Cath and more punctate than expected given adjacent limited left ventricular activity as expected. Limited soft tissue correlate of the cardiac areas of concern however could be artifactual from injection although indeterminate with attention on follow-up or with potential echo.  D: 11/30/2021 E: 11/30/2021    This report was finalized on 12/1/2021 1:33 PM by Dr. Gray Corona.        ASSESSMENT: The patient is a very pleasant 76 y.o. female  with diffuse large B-cell lymphoma    PROBLEM LIST:  1.  Diffuse large B-cell lymphoma with increased proliferation rate and pending FISH studies, stage IIIb:  A.  Presented with fatigue unexplained weight loss  B.  Diagnosed after CT-guided retroperitoneal mass biopsy done by IR November 24, 2021  C.  Bone marrow biopsy done November 24, 2021 negative for lymphoma involvement  D.  Whole-body PET scan done on November 30, 2021 revealed disease activity above and below the diaphragm with bulky mass next the left kidney.  E.  Started R-CHOP chemotherapy December 1, 2021, status post 1 cycle  2.  Weight loss  3.  Hypertension  4.  Hypercholesteremia  5. Acute kidney injury  6.  Chemotherapy-induced nausea  7.  Chemotherapy-induced anemia    PLAN:  1.  I will proceed with chemotherapy as scheduled today R-CHOP cycle #1.  2.  The patient will follow up with us in 3 weeks for cycle #2.  3.  We will plan a total of 6 cycles based on response and tolerance.  4.  I will repeat the patient's scans prior to cycle #4.  5.  I will monitor the patient blood work including blood counts kidney function liver function and electrolytes.  6.  I will continue the patient on prednisone 100 mg day 1 through 5 of each cycle.  I will add GI prophylaxis with  Prilosec 40 mg daily.  7.  The patient will finish allopurinol 300 mg daily later on this month.  8.  We discussed the potential risks and side effects of R CHOP chemotherapy including neutropenia, alopecia, nausea and vomiting, fatigue, neuropathy, cardiomyopathy, constipation, infusion reaction, and a small risk of myelodysplasia.  9.  I will start the patient on Zofran as needed for chemotherapy-induced nausea.  10.  I will monitor the patient blood pressure.  We may have to adjust metoprolol dose while she is on chemotherapy.  11.  We will continue port care.  I will give the patient prescription for EMLA cream.  12.  I did go over the cardiac echo result with the patient reassured her she had adequate ejection fraction.  I repeat this down the road on as-needed basis.  13.  I will continue Zofran as needed for chemotherapy-induced nausea.  14.  The patient had 2 units of blood.  We will monitor hemoglobin carefully.  This is a direct toxicity of chemotherapy.    Total time of patient care including preparation of patient chart prior to arrival, face-to-face with patient and following visit spent in reviewing records, lab results, imaging studies, discussion with patient, and documentation/charting was 40 minutes           Adelaida Al MD  12/27/2021

## 2022-01-03 ENCOUNTER — APPOINTMENT (OUTPATIENT)
Dept: ONCOLOGY | Facility: HOSPITAL | Age: 77
End: 2022-01-03

## 2022-01-04 ENCOUNTER — HOSPITAL ENCOUNTER (OUTPATIENT)
Dept: ONCOLOGY | Facility: HOSPITAL | Age: 77
Setting detail: INFUSION SERIES
Discharge: HOME | End: 2022-01-04

## 2022-01-04 ENCOUNTER — TELEPHONE (OUTPATIENT)
Dept: ONCOLOGY | Facility: CLINIC | Age: 77
End: 2022-01-04

## 2022-01-04 VITALS
WEIGHT: 136.5 LBS | TEMPERATURE: 97.7 F | DIASTOLIC BLOOD PRESSURE: 54 MMHG | RESPIRATION RATE: 18 BRPM | HEART RATE: 86 BPM | SYSTOLIC BLOOD PRESSURE: 108 MMHG | BODY MASS INDEX: 22.71 KG/M2

## 2022-01-04 DIAGNOSIS — C83.33 DIFFUSE LARGE B-CELL LYMPHOMA OF INTRA-ABDOMINAL LYMPH NODES: Primary | ICD-10-CM

## 2022-01-04 DIAGNOSIS — Z45.2 ENCOUNTER FOR CENTRAL LINE CARE: ICD-10-CM

## 2022-01-04 LAB
ABO GROUP BLD: NORMAL
BASOPHILS # BLD AUTO: 0.03 10*3/MM3 (ref 0–0.2)
BASOPHILS NFR BLD AUTO: 5.2 % (ref 0–1.5)
BLD GP AB SCN SERPL QL: NEGATIVE
DEPRECATED RDW RBC AUTO: 61.9 FL (ref 37–54)
EOSINOPHIL # BLD AUTO: 0.03 10*3/MM3 (ref 0–0.4)
EOSINOPHIL NFR BLD AUTO: 5.2 % (ref 0.3–6.2)
ERYTHROCYTE [DISTWIDTH] IN BLOOD BY AUTOMATED COUNT: 18.1 % (ref 12.3–15.4)
HCT VFR BLD AUTO: 18.4 % (ref 34–46.6)
HGB BLD-MCNC: 6.1 G/DL (ref 12–15.9)
IMM GRANULOCYTES # BLD AUTO: 0.01 10*3/MM3 (ref 0–0.05)
IMM GRANULOCYTES NFR BLD AUTO: 1.7 % (ref 0–0.5)
LYMPHOCYTES # BLD AUTO: 0.21 10*3/MM3 (ref 0.7–3.1)
LYMPHOCYTES NFR BLD AUTO: 36.2 % (ref 19.6–45.3)
MCH RBC QN AUTO: 31.4 PG (ref 26.6–33)
MCHC RBC AUTO-ENTMCNC: 33.2 G/DL (ref 31.5–35.7)
MCV RBC AUTO: 94.8 FL (ref 79–97)
MONOCYTES # BLD AUTO: 0.1 10*3/MM3 (ref 0.1–0.9)
MONOCYTES NFR BLD AUTO: 17.2 % (ref 5–12)
NEUTROPHILS NFR BLD AUTO: 0.2 10*3/MM3 (ref 1.7–7)
NEUTROPHILS NFR BLD AUTO: 34.5 % (ref 42.7–76)
NRBC BLD AUTO-RTO: 0 /100 WBC (ref 0–0.2)
PLATELET # BLD AUTO: 52 10*3/MM3 (ref 140–450)
PMV BLD AUTO: 12.7 FL (ref 6–12)
RBC # BLD AUTO: 1.94 10*6/MM3 (ref 3.77–5.28)
RH BLD: POSITIVE
T&S EXPIRATION DATE: NORMAL
WBC NRBC COR # BLD: 0.58 10*3/MM3 (ref 3.4–10.8)

## 2022-01-04 PROCEDURE — 86900 BLOOD TYPING SEROLOGIC ABO: CPT

## 2022-01-04 PROCEDURE — 96374 THER/PROPH/DIAG INJ IV PUSH: CPT

## 2022-01-04 PROCEDURE — 63710000001 DIPHENHYDRAMINE PER 50 MG: Performed by: INTERNAL MEDICINE

## 2022-01-04 PROCEDURE — 85025 COMPLETE CBC W/AUTO DIFF WBC: CPT | Performed by: INTERNAL MEDICINE

## 2022-01-04 PROCEDURE — 96375 TX/PRO/DX INJ NEW DRUG ADDON: CPT

## 2022-01-04 PROCEDURE — 86850 RBC ANTIBODY SCREEN: CPT | Performed by: INTERNAL MEDICINE

## 2022-01-04 PROCEDURE — 86901 BLOOD TYPING SEROLOGIC RH(D): CPT | Performed by: INTERNAL MEDICINE

## 2022-01-04 PROCEDURE — 25010000002 FUROSEMIDE PER 20 MG: Performed by: INTERNAL MEDICINE

## 2022-01-04 PROCEDURE — 36430 TRANSFUSION BLD/BLD COMPNT: CPT

## 2022-01-04 PROCEDURE — 25010000002 HEPARIN LOCK FLUSH PER 10 UNITS: Performed by: INTERNAL MEDICINE

## 2022-01-04 PROCEDURE — P9016 RBC LEUKOCYTES REDUCED: HCPCS

## 2022-01-04 PROCEDURE — 86900 BLOOD TYPING SEROLOGIC ABO: CPT | Performed by: INTERNAL MEDICINE

## 2022-01-04 PROCEDURE — 86923 COMPATIBILITY TEST ELECTRIC: CPT

## 2022-01-04 RX ORDER — FUROSEMIDE 10 MG/ML
20 INJECTION INTRAMUSCULAR; INTRAVENOUS ONCE
Status: COMPLETED | OUTPATIENT
Start: 2022-01-04 | End: 2022-01-04

## 2022-01-04 RX ORDER — HEPARIN SODIUM (PORCINE) LOCK FLUSH IV SOLN 100 UNIT/ML 100 UNIT/ML
500 SOLUTION INTRAVENOUS AS NEEDED
Status: DISCONTINUED | OUTPATIENT
Start: 2022-01-04 | End: 2022-01-05 | Stop reason: HOSPADM

## 2022-01-04 RX ORDER — ACETAMINOPHEN 325 MG/1
650 TABLET ORAL ONCE
Status: COMPLETED | OUTPATIENT
Start: 2022-01-04 | End: 2022-01-04

## 2022-01-04 RX ORDER — SODIUM CHLORIDE 0.9 % (FLUSH) 0.9 %
10 SYRINGE (ML) INJECTION AS NEEDED
Status: CANCELLED | OUTPATIENT
Start: 2022-01-04

## 2022-01-04 RX ORDER — HEPARIN SODIUM (PORCINE) LOCK FLUSH IV SOLN 100 UNIT/ML 100 UNIT/ML
500 SOLUTION INTRAVENOUS AS NEEDED
Status: CANCELLED | OUTPATIENT
Start: 2022-01-04

## 2022-01-04 RX ORDER — SODIUM CHLORIDE 9 MG/ML
250 INJECTION, SOLUTION INTRAVENOUS AS NEEDED
Status: DISCONTINUED | OUTPATIENT
Start: 2022-01-04 | End: 2022-01-05 | Stop reason: HOSPADM

## 2022-01-04 RX ORDER — DIPHENHYDRAMINE HCL 25 MG
25 CAPSULE ORAL ONCE
Status: COMPLETED | OUTPATIENT
Start: 2022-01-04 | End: 2022-01-04

## 2022-01-04 RX ADMIN — SODIUM CHLORIDE 250 ML: 9 INJECTION, SOLUTION INTRAVENOUS at 09:30

## 2022-01-04 RX ADMIN — ACETAMINOPHEN 650 MG: 325 TABLET, FILM COATED ORAL at 09:36

## 2022-01-04 RX ADMIN — FUROSEMIDE 20 MG: 10 INJECTION INTRAMUSCULAR; INTRAVENOUS at 11:59

## 2022-01-04 RX ADMIN — DIPHENHYDRAMINE HYDROCHLORIDE 25 MG: 25 CAPSULE ORAL at 09:36

## 2022-01-04 RX ADMIN — HEPARIN 500 UNITS: 100 SYRINGE at 15:02

## 2022-01-04 NOTE — TELEPHONE ENCOUNTER
Name of Physician notified: Adelaida Al MD    Time Physician Notified: 08:58      [x]  Orders received - Transfuse 2 units PRBC.  Discussed with jeff Toth in infusion to go over neutropenic precautions and have patient call me with any symptoms including fever. Per Dr. Al decrease adriamycin and cytoxan by 25% for future cycles.  Doses adjusted.   []  Protocol/Standing orders followed    []  No new orders

## 2022-01-04 NOTE — TELEPHONE ENCOUNTER
----- Message from Yolanda Morales RN sent at 1/4/2022  8:41 AM EST -----      Critical Test Results      JESSE Al    Date: 1/04/2021     Critical test result: WBC 0.6, ANC----undetected, HEM 6.2    Time results received: 8:42

## 2022-01-10 DIAGNOSIS — C83.33 DIFFUSE LARGE B-CELL LYMPHOMA OF INTRA-ABDOMINAL LYMPH NODES: ICD-10-CM

## 2022-01-10 DIAGNOSIS — R11.2 INTRACTABLE VOMITING WITH NAUSEA, UNSPECIFIED VOMITING TYPE: ICD-10-CM

## 2022-01-10 RX ORDER — MEGESTROL ACETATE 40 MG/ML
SUSPENSION ORAL
Qty: 240 ML | Refills: 0 | Status: SHIPPED | OUTPATIENT
Start: 2022-01-10 | End: 2022-01-20

## 2022-01-11 RX ORDER — EPINEPHRINE 1 MG/ML
0.3 INJECTION, SOLUTION INTRAMUSCULAR; SUBCUTANEOUS AS NEEDED
Status: CANCELLED | OUTPATIENT
Start: 2022-01-21

## 2022-01-11 RX ORDER — DIPHENHYDRAMINE HCL 50 MG
50 CAPSULE ORAL ONCE AS NEEDED
Status: CANCELLED | OUTPATIENT
Start: 2022-01-21

## 2022-01-14 ENCOUNTER — HOSPITAL ENCOUNTER (OUTPATIENT)
Dept: ONCOLOGY | Facility: HOSPITAL | Age: 77
Discharge: HOME | End: 2022-01-14
Admitting: INTERNAL MEDICINE

## 2022-01-14 DIAGNOSIS — C83.33 DIFFUSE LARGE B-CELL LYMPHOMA OF INTRA-ABDOMINAL LYMPH NODES: ICD-10-CM

## 2022-01-14 DIAGNOSIS — Z45.2 ENCOUNTER FOR CENTRAL LINE CARE: Primary | ICD-10-CM

## 2022-01-14 LAB
ALBUMIN SERPL-MCNC: 3.5 G/DL (ref 3.5–5.2)
ALBUMIN/GLOB SERPL: 1.3 G/DL
ALP SERPL-CCNC: 69 U/L (ref 39–117)
ALT SERPL W P-5'-P-CCNC: 10 U/L (ref 1–33)
ANION GAP SERPL CALCULATED.3IONS-SCNC: 10 MMOL/L (ref 5–15)
AST SERPL-CCNC: 15 U/L (ref 1–32)
BILIRUB SERPL-MCNC: 0.2 MG/DL (ref 0–1.2)
BUN SERPL-MCNC: 23 MG/DL (ref 8–23)
BUN/CREAT SERPL: 29.5 (ref 7–25)
CALCIUM SPEC-SCNC: 8.8 MG/DL (ref 8.6–10.5)
CHLORIDE SERPL-SCNC: 109 MMOL/L (ref 98–107)
CO2 SERPL-SCNC: 24 MMOL/L (ref 22–29)
CREAT SERPL-MCNC: 0.78 MG/DL (ref 0.57–1)
ERYTHROCYTE [DISTWIDTH] IN BLOOD BY AUTOMATED COUNT: 22.3 % (ref 12.3–15.4)
GFR SERPL CREATININE-BSD FRML MDRD: 71 ML/MIN/1.73
GLOBULIN UR ELPH-MCNC: 2.7 GM/DL
GLUCOSE SERPL-MCNC: 116 MG/DL (ref 65–99)
HCT VFR BLD AUTO: 29.3 % (ref 34–46.6)
HGB BLD-MCNC: 9.7 G/DL (ref 12–15.9)
LYMPHOCYTES # BLD AUTO: 0.8 10*3/MM3 (ref 0.7–3.1)
LYMPHOCYTES NFR BLD AUTO: 5.7 % (ref 19.6–45.3)
MCH RBC QN AUTO: 31.1 PG (ref 26.6–33)
MCHC RBC AUTO-ENTMCNC: 33.1 G/DL (ref 31.5–35.7)
MCV RBC AUTO: 94 FL (ref 79–97)
MONOCYTES # BLD AUTO: 0.5 10*3/MM3 (ref 0.1–0.9)
MONOCYTES NFR BLD AUTO: 3.4 % (ref 5–12)
NEUTROPHILS NFR BLD AUTO: 12.7 10*3/MM3 (ref 1.7–7)
NEUTROPHILS NFR BLD AUTO: 90.9 % (ref 42.7–76)
PLATELET # BLD AUTO: 462 10*3/MM3 (ref 140–450)
PMV BLD AUTO: 8.3 FL (ref 6–12)
POTASSIUM SERPL-SCNC: 3.7 MMOL/L (ref 3.5–5.2)
PROT SERPL-MCNC: 6.2 G/DL (ref 6–8.5)
RBC # BLD AUTO: 3.12 10*6/MM3 (ref 3.77–5.28)
SODIUM SERPL-SCNC: 143 MMOL/L (ref 136–145)
WBC NRBC COR # BLD: 14 10*3/MM3 (ref 3.4–10.8)

## 2022-01-14 PROCEDURE — 25010000002 HEPARIN LOCK FLUSH PER 10 UNITS: Performed by: INTERNAL MEDICINE

## 2022-01-14 PROCEDURE — 36591 DRAW BLOOD OFF VENOUS DEVICE: CPT

## 2022-01-14 PROCEDURE — 85025 COMPLETE CBC W/AUTO DIFF WBC: CPT | Performed by: INTERNAL MEDICINE

## 2022-01-14 PROCEDURE — 80053 COMPREHEN METABOLIC PANEL: CPT | Performed by: INTERNAL MEDICINE

## 2022-01-14 RX ORDER — SODIUM CHLORIDE 0.9 % (FLUSH) 0.9 %
10 SYRINGE (ML) INJECTION AS NEEDED
Status: CANCELLED | OUTPATIENT
Start: 2022-01-14

## 2022-01-14 RX ORDER — HEPARIN SODIUM (PORCINE) LOCK FLUSH IV SOLN 100 UNIT/ML 100 UNIT/ML
500 SOLUTION INTRAVENOUS AS NEEDED
Status: CANCELLED | OUTPATIENT
Start: 2022-01-14

## 2022-01-14 RX ORDER — HEPARIN SODIUM (PORCINE) LOCK FLUSH IV SOLN 100 UNIT/ML 100 UNIT/ML
500 SOLUTION INTRAVENOUS AS NEEDED
Status: DISCONTINUED | OUTPATIENT
Start: 2022-01-14 | End: 2022-01-15 | Stop reason: HOSPADM

## 2022-01-14 RX ADMIN — HEPARIN SODIUM (PORCINE) LOCK FLUSH IV SOLN 100 UNIT/ML 500 UNITS: 100 SOLUTION at 13:44

## 2022-01-17 ENCOUNTER — HOSPITAL ENCOUNTER (OUTPATIENT)
Dept: ONCOLOGY | Facility: HOSPITAL | Age: 77
Setting detail: INFUSION SERIES
Discharge: HOME | End: 2022-01-17

## 2022-01-17 ENCOUNTER — OFFICE VISIT (OUTPATIENT)
Dept: ONCOLOGY | Facility: CLINIC | Age: 77
End: 2022-01-17

## 2022-01-17 VITALS
SYSTOLIC BLOOD PRESSURE: 120 MMHG | OXYGEN SATURATION: 99 % | BODY MASS INDEX: 22.49 KG/M2 | RESPIRATION RATE: 16 BRPM | DIASTOLIC BLOOD PRESSURE: 59 MMHG | HEART RATE: 77 BPM | HEIGHT: 65 IN | WEIGHT: 135 LBS | TEMPERATURE: 97.3 F

## 2022-01-17 VITALS — HEART RATE: 64 BPM | SYSTOLIC BLOOD PRESSURE: 137 MMHG | DIASTOLIC BLOOD PRESSURE: 68 MMHG

## 2022-01-17 DIAGNOSIS — C83.33 DIFFUSE LARGE B-CELL LYMPHOMA OF INTRA-ABDOMINAL LYMPH NODES: Primary | ICD-10-CM

## 2022-01-17 DIAGNOSIS — Z45.2 ENCOUNTER FOR CENTRAL LINE CARE: ICD-10-CM

## 2022-01-17 PROCEDURE — 25010000002 DIPHENHYDRAMINE PER 50 MG: Performed by: INTERNAL MEDICINE

## 2022-01-17 PROCEDURE — 25010000002 PEGFILGRASTIM 6 MG/0.6ML PREFILLED SYRINGE KIT: Performed by: INTERNAL MEDICINE

## 2022-01-17 PROCEDURE — 96368 THER/DIAG CONCURRENT INF: CPT

## 2022-01-17 PROCEDURE — 96367 TX/PROPH/DG ADDL SEQ IV INF: CPT

## 2022-01-17 PROCEDURE — 25010000002 RITUXIMAB 10 MG/ML SOLUTION 10 ML VIAL: Performed by: INTERNAL MEDICINE

## 2022-01-17 PROCEDURE — 96415 CHEMO IV INFUSION ADDL HR: CPT

## 2022-01-17 PROCEDURE — 25010000002 PALONOSETRON PER 25 MCG: Performed by: INTERNAL MEDICINE

## 2022-01-17 PROCEDURE — 25010000002 VINCRISTINE PER 1 MG: Performed by: INTERNAL MEDICINE

## 2022-01-17 PROCEDURE — 25010000002 DOXORUBICIN PER 10 MG: Performed by: INTERNAL MEDICINE

## 2022-01-17 PROCEDURE — 25010000002 HEPARIN LOCK FLUSH PER 10 UNITS: Performed by: INTERNAL MEDICINE

## 2022-01-17 PROCEDURE — 96366 THER/PROPH/DIAG IV INF ADDON: CPT

## 2022-01-17 PROCEDURE — 96375 TX/PRO/DX INJ NEW DRUG ADDON: CPT

## 2022-01-17 PROCEDURE — 99215 OFFICE O/P EST HI 40 MIN: CPT | Performed by: INTERNAL MEDICINE

## 2022-01-17 PROCEDURE — 25010000002 DEXAMETHASONE SODIUM PHOSPHATE 100 MG/10ML SOLUTION: Performed by: INTERNAL MEDICINE

## 2022-01-17 PROCEDURE — 96409 CHEMO IV PUSH SNGL DRUG: CPT

## 2022-01-17 PROCEDURE — 25010000002 RITUXIMAB 10 MG/ML SOLUTION 50 ML VIAL: Performed by: INTERNAL MEDICINE

## 2022-01-17 PROCEDURE — 96411 CHEMO IV PUSH ADDL DRUG: CPT

## 2022-01-17 PROCEDURE — 25010000002 FOSAPREPITANT PER 1 MG: Performed by: INTERNAL MEDICINE

## 2022-01-17 PROCEDURE — 96417 CHEMO IV INFUS EACH ADDL SEQ: CPT

## 2022-01-17 PROCEDURE — 25010000002 CYCLOPHOSPHAMIDE 1 GM/5ML SOLUTION 5 ML VIAL: Performed by: INTERNAL MEDICINE

## 2022-01-17 PROCEDURE — 96377 APPLICATON ON-BODY INJECTOR: CPT

## 2022-01-17 PROCEDURE — 96365 THER/PROPH/DIAG IV INF INIT: CPT

## 2022-01-17 PROCEDURE — 96413 CHEMO IV INFUSION 1 HR: CPT

## 2022-01-17 RX ORDER — SODIUM CHLORIDE 9 MG/ML
250 INJECTION, SOLUTION INTRAVENOUS ONCE
Status: COMPLETED | OUTPATIENT
Start: 2022-01-17 | End: 2022-01-17

## 2022-01-17 RX ORDER — OLANZAPINE 5 MG/1
5 TABLET ORAL ONCE
Status: COMPLETED | OUTPATIENT
Start: 2022-01-17 | End: 2022-01-17

## 2022-01-17 RX ORDER — DOXORUBICIN HYDROCHLORIDE 2 MG/ML
37.5 INJECTION, SOLUTION INTRAVENOUS ONCE
Status: COMPLETED | OUTPATIENT
Start: 2022-01-17 | End: 2022-01-17

## 2022-01-17 RX ORDER — SODIUM CHLORIDE 0.9 % (FLUSH) 0.9 %
10 SYRINGE (ML) INJECTION AS NEEDED
Status: CANCELLED | OUTPATIENT
Start: 2022-01-17

## 2022-01-17 RX ORDER — MIRTAZAPINE 15 MG/1
15 TABLET, FILM COATED ORAL NIGHTLY
Qty: 30 TABLET | Refills: 1 | Status: SHIPPED | OUTPATIENT
Start: 2022-01-17

## 2022-01-17 RX ORDER — DOXORUBICIN HYDROCHLORIDE 2 MG/ML
37.5 INJECTION, SOLUTION INTRAVENOUS ONCE
Status: CANCELLED | OUTPATIENT
Start: 2022-01-17

## 2022-01-17 RX ORDER — ACETAMINOPHEN 325 MG/1
650 TABLET ORAL ONCE
Status: COMPLETED | OUTPATIENT
Start: 2022-01-17 | End: 2022-01-17

## 2022-01-17 RX ORDER — PALONOSETRON 0.05 MG/ML
0.25 INJECTION, SOLUTION INTRAVENOUS ONCE
Status: COMPLETED | OUTPATIENT
Start: 2022-01-17 | End: 2022-01-17

## 2022-01-17 RX ORDER — HEPARIN SODIUM (PORCINE) LOCK FLUSH IV SOLN 100 UNIT/ML 100 UNIT/ML
500 SOLUTION INTRAVENOUS AS NEEDED
Status: DISCONTINUED | OUTPATIENT
Start: 2022-01-17 | End: 2022-01-18 | Stop reason: HOSPADM

## 2022-01-17 RX ORDER — HEPARIN SODIUM (PORCINE) LOCK FLUSH IV SOLN 100 UNIT/ML 100 UNIT/ML
500 SOLUTION INTRAVENOUS AS NEEDED
Status: CANCELLED | OUTPATIENT
Start: 2022-01-17

## 2022-01-17 RX ADMIN — HEPARIN SODIUM (PORCINE) LOCK FLUSH IV SOLN 100 UNIT/ML 500 UNITS: 100 SOLUTION at 14:54

## 2022-01-17 RX ADMIN — DOXORUBICIN HYDROCHLORIDE 62 MG: 2 INJECTION, SOLUTION INTRAVENOUS at 13:53

## 2022-01-17 RX ADMIN — SODIUM CHLORIDE 250 ML: 9 INJECTION, SOLUTION INTRAVENOUS at 10:03

## 2022-01-17 RX ADMIN — PEGFILGRASTIM 6 MG: KIT SUBCUTANEOUS at 14:55

## 2022-01-17 RX ADMIN — SODIUM CHLORIDE 150 MG: 9 INJECTION, SOLUTION INTRAVENOUS at 12:47

## 2022-01-17 RX ADMIN — VINCRISTINE SULFATE 2 MG: 1 INJECTION, SOLUTION INTRAVENOUS at 14:02

## 2022-01-17 RX ADMIN — OLANZAPINE 5 MG: 5 TABLET, FILM COATED ORAL at 12:44

## 2022-01-17 RX ADMIN — PALONOSETRON HYDROCHLORIDE 0.25 MG: 0.25 INJECTION INTRAVENOUS at 12:45

## 2022-01-17 RX ADMIN — DIPHENHYDRAMINE HYDROCHLORIDE 50 MG: 50 INJECTION INTRAMUSCULAR; INTRAVENOUS at 10:05

## 2022-01-17 RX ADMIN — ACETAMINOPHEN 650 MG: 325 TABLET, FILM COATED ORAL at 10:04

## 2022-01-17 RX ADMIN — CYCLOPHOSPHAMIDE 930 MG: 200 INJECTION, SOLUTION INTRAVENOUS at 14:17

## 2022-01-17 RX ADMIN — RITUXIMAB 600 MG: 10 INJECTION, SOLUTION INTRAVENOUS at 11:07

## 2022-01-17 RX ADMIN — DEXAMETHASONE SODIUM PHOSPHATE 12 MG: 10 INJECTION, SOLUTION INTRAMUSCULAR; INTRAVENOUS at 12:47

## 2022-01-17 NOTE — PROGRESS NOTES
DATE OF VISIT: 1/17/2022    REASON FOR VISIT: Followup for diffuse large B-cell lymphoma     HISTORY OF PRESENT ILLNESS: The patient is a very pleasant 76 y.o. female  with past medical history significant for diffuse large B-cell lymphoma diagnosed November 2021.  The patient was started on R-CHOP chemotherapy December 3, 2021. The  patient is here today for scheduled follow-up visit with treatment cycle #3.    SUBJECTIVE: The patient is here today with her son.  She is complaining of mild fatigue.  Appetite is improving.  She has been able to tolerate chemotherapy multiple side effects but she is having severe anemia that required blood transfusion.  Has been having severe neutropenia however no signs or symptoms of infection.    PAST MEDICAL HISTORY/SOCIAL HISTORY/FAMILY HISTORY: Reviewed by me and unchanged from my documentation done on 01/17/22.    Review of Systems   Constitutional: Negative for activity change, appetite change, chills, fatigue, fever and unexpected weight change.   HENT: Negative for hearing loss, mouth sores, nosebleeds, sore throat and trouble swallowing.    Eyes: Negative for visual disturbance.   Respiratory: Negative for cough, chest tightness, shortness of breath and wheezing.    Cardiovascular: Negative for chest pain, palpitations and leg swelling.   Gastrointestinal: Negative for abdominal distention, abdominal pain, blood in stool, constipation, diarrhea, nausea, rectal pain and vomiting.   Endocrine: Negative for cold intolerance and heat intolerance.   Genitourinary: Negative for difficulty urinating, dysuria, frequency and urgency.   Musculoskeletal: Negative for arthralgias, back pain, gait problem, joint swelling and myalgias.   Skin: Negative for rash.   Neurological: Negative for dizziness, tremors, syncope, weakness, light-headedness, numbness and headaches.   Hematological: Negative for adenopathy. Does not bruise/bleed easily.   Psychiatric/Behavioral: Negative for  confusion, sleep disturbance and suicidal ideas. The patient is not nervous/anxious.          Current Outpatient Medications:   •  acetaminophen (TYLENOL) 500 MG tablet, Take 1 tablet by mouth Every 6 (Six) Hours As Needed for Mild Pain  or Moderate Pain ., Disp: , Rfl:   •  allopurinol (Zyloprim) 300 MG tablet, Take 1 tablet by mouth Daily., Disp: 30 tablet, Rfl: 1  •  Apremilast (OTEZLA PO), 30 mg., Disp: , Rfl:   •  aspirin (Aspirin 81) 81 MG chewable tablet, Aspir-81, Disp: , Rfl:   •  ciprofloxacin (Cipro) 500 MG tablet, Take 1 tablet by mouth 2 (Two) Times a Day., Disp: 10 tablet, Rfl: 0  •  fluconazole (DIFLUCAN) 100 MG tablet, Take 1 tablet by mouth Daily., Disp: 5 tablet, Rfl: 0  •  hydroCHLOROthiazide (HYDRODIURIL) 12.5 MG tablet, , Disp: , Rfl:   •  lidocaine-prilocaine (EMLA) 2.5-2.5 % cream, Apply 1 application topically to the appropriate area as directed As Needed (45-60 minutes prior to port access.  Cover with saran/plastic wrap.)., Disp: 30 g, Rfl: 3  •  megestrol (MEGACE) 40 MG/ML suspension, TAKE 20 MLS BY MOUTH ONCE DAILY, Disp: 240 mL, Rfl: 0  •  metoprolol succinate XL (TOPROL-XL) 25 MG 24 hr tablet, Take 1 tablet by mouth Daily., Disp: , Rfl:   •  Metoprolol-hydroCHLOROthiazide (DUTOPROL PO), metoprolol rooney-hydrochlorothiaz, Disp: , Rfl:   •  mirtazapine (REMERON) 15 MG tablet, Take 1 tablet by mouth Every Night., Disp: 30 tablet, Rfl: 1  •  nitrofurantoin, macrocrystal-monohydrate, (MACROBID) 100 MG capsule, , Disp: , Rfl:   •  nystatin (MYCOSTATIN) 100,000 unit/mL suspension, Swish and swallow 5 mL 4 (Four) Times a Day., Disp: 200 mL, Rfl: 0  •  ondansetron (ZOFRAN) 8 MG tablet, Take 1 tablet by mouth 3 (Three) Times a Day As Needed for Nausea or Vomiting., Disp: 30 tablet, Rfl: 5  •  ondansetron ODT (Zofran ODT) 8 MG disintegrating tablet, Place 1 tablet on the tongue Every 8 (Eight) Hours As Needed for Nausea or Vomiting., Disp: 30 tablet, Rfl: 1  •  potassium chloride (K-DUR,KLOR-CON) 20  "MEQ CR tablet, Take 1 tablet by mouth Daily., Disp: 7 tablet, Rfl: 0  •  potassium chloride 10 MEQ CR tablet, , Disp: , Rfl:   •  predniSONE (DELTASONE) 20 MG tablet, Take 60 mg daily for 2 days with food.  Taper by 20 mg every 2 days until complete, Disp: 12 tablet, Rfl: 0  •  rosuvastatin (CRESTOR) 20 MG tablet, Take 20 mg by mouth., Disp: , Rfl:     PHYSICAL EXAMINATION:   /59   Pulse 77   Temp 97.3 °F (36.3 °C) (Temporal)   Resp 16   Ht 165.1 cm (65\")   Wt 61.2 kg (135 lb)   SpO2 99%   BMI 22.47 kg/m²    Pain Score    01/17/22 0831   PainSc: 0-No pain        ECOG score: 2            ECOG Performance Status: 1 - Symptomatic but completely ambulatory  General Appearance:  alert, cooperative, no apparent distress and appears stated age   Neurologic/Psychiatric: A&O x 3, gait steady, appropriate affect, strength 5/5 in all muscle groups   HEENT:  Normocephalic, without obvious abnormality, mucous membranes moist   Neck: Supple, symmetrical, trachea midline, no adenopathy;  No thyromegaly, masses, or tenderness   Lungs:   Clear to auscultation bilaterally; respirations regular, even, and unlabored bilaterally   Heart:  Regular rate and rhythm, no murmurs appreciated   Abdomen:   Soft, non-tender, non-distended and no organomegaly   Lymph nodes: No cervical, supraclavicular, inguinal or axillary adenopathy noted   Extremities: Normal, atraumatic; no clubbing, cyanosis, or edema    Skin: No rashes, ulcers, or suspicious lesions noted     Hospital Outpatient Visit on 01/14/2022   Component Date Value Ref Range Status   • Glucose 01/14/2022 116* 65 - 99 mg/dL Final   • BUN 01/14/2022 23  8 - 23 mg/dL Final   • Creatinine 01/14/2022 0.78  0.57 - 1.00 mg/dL Final   • Sodium 01/14/2022 143  136 - 145 mmol/L Final   • Potassium 01/14/2022 3.7  3.5 - 5.2 mmol/L Final    Slight hemolysis detected by analyzer. Results may be affected.   • Chloride 01/14/2022 109* 98 - 107 mmol/L Final   • CO2 01/14/2022 24.0  22.0 " - 29.0 mmol/L Final   • Calcium 01/14/2022 8.8  8.6 - 10.5 mg/dL Final   • Total Protein 01/14/2022 6.2  6.0 - 8.5 g/dL Final   • Albumin 01/14/2022 3.50  3.50 - 5.20 g/dL Final   • ALT (SGPT) 01/14/2022 10  1 - 33 U/L Final   • AST (SGOT) 01/14/2022 15  1 - 32 U/L Final   • Alkaline Phosphatase 01/14/2022 69  39 - 117 U/L Final   • Total Bilirubin 01/14/2022 0.2  0.0 - 1.2 mg/dL Final   • eGFR Non  Amer 01/14/2022 71  >60 mL/min/1.73 Final   • Globulin 01/14/2022 2.7  gm/dL Final    Calculated Result   • A/G Ratio 01/14/2022 1.3  g/dL Final   • BUN/Creatinine Ratio 01/14/2022 29.5* 7.0 - 25.0 Final   • Anion Gap 01/14/2022 10.0  5.0 - 15.0 mmol/L Final   • WBC 01/14/2022 14.00* 3.40 - 10.80 10*3/mm3 Final   • RBC 01/14/2022 3.12* 3.77 - 5.28 10*6/mm3 Final   • Hemoglobin 01/14/2022 9.7* 12.0 - 15.9 g/dL Final   • Hematocrit 01/14/2022 29.3* 34.0 - 46.6 % Final   • RDW 01/14/2022 22.3* 12.3 - 15.4 % Final   • MCV 01/14/2022 94.0  79.0 - 97.0 fL Final   • MCH 01/14/2022 31.1  26.6 - 33.0 pg Final   • MCHC 01/14/2022 33.1  31.5 - 35.7 g/dL Final   • MPV 01/14/2022 8.3  6.0 - 12.0 fL Final   • Platelets 01/14/2022 462* 140 - 450 10*3/mm3 Final   • Neutrophil % 01/14/2022 90.9* 42.7 - 76.0 % Final   • Lymphocyte % 01/14/2022 5.7* 19.6 - 45.3 % Final   • Monocyte % 01/14/2022 3.4* 5.0 - 12.0 % Final   • Neutrophils, Absolute 01/14/2022 12.70* 1.70 - 7.00 10*3/mm3 Final   • Lymphocytes, Absolute 01/14/2022 0.80  0.70 - 3.10 10*3/mm3 Final   • Monocytes, Absolute 01/14/2022 0.50  0.10 - 0.90 10*3/mm3 Final   Hospital Outpatient Visit on 01/04/2022   Component Date Value Ref Range Status   • ABO Type 01/04/2022 O   Final   • RH type 01/04/2022 Positive   Final   • Antibody Screen 01/04/2022 Negative   Final   • T&S Expiration Date 01/04/2022 1/7/2022 11:59:59 PM   Final   • Product Code 01/05/2022 [id number]   Final   • Unit Number 01/05/2022 [id number]   Final   • UNIT  ABO 01/05/2022 O   Final   • UNIT  RH  01/05/2022 POS   Final   • Crossmatch Interpretation 01/05/2022 Compatible   Final   • Dispense Status 01/05/2022 PT   Final   • Blood Expiration Date 01/05/2022 202202012359   Final   • Blood Type Barcode 01/05/2022 5100   Final   • Product Code 01/05/2022 [id number]   Final   • Unit Number 01/05/2022 [id number]   Final   • UNIT  ABO 01/05/2022 O   Final   • UNIT  RH 01/05/2022 POS   Final   • Crossmatch Interpretation 01/05/2022 Compatible   Final   • Dispense Status 01/05/2022 PT   Final   • Blood Expiration Date 01/05/2022 202202012359   Final   • Blood Type Barcode 01/05/2022 5100   Final   • WBC 01/04/2022 0.58* 3.40 - 10.80 10*3/mm3 Final   • RBC 01/04/2022 1.94* 3.77 - 5.28 10*6/mm3 Final   • Hemoglobin 01/04/2022 6.1* 12.0 - 15.9 g/dL Final   • Hematocrit 01/04/2022 18.4* 34.0 - 46.6 % Final   • MCV 01/04/2022 94.8  79.0 - 97.0 fL Final   • MCH 01/04/2022 31.4  26.6 - 33.0 pg Final   • MCHC 01/04/2022 33.2  31.5 - 35.7 g/dL Final   • RDW 01/04/2022 18.1* 12.3 - 15.4 % Final   • RDW-SD 01/04/2022 61.9* 37.0 - 54.0 fl Final   • MPV 01/04/2022 12.7* 6.0 - 12.0 fL Final   • Platelets 01/04/2022 52* 140 - 450 10*3/mm3 Final   • Neutrophil % 01/04/2022 34.5* 42.7 - 76.0 % Final   • Lymphocyte % 01/04/2022 36.2  19.6 - 45.3 % Final   • Monocyte % 01/04/2022 17.2* 5.0 - 12.0 % Final   • Eosinophil % 01/04/2022 5.2  0.3 - 6.2 % Final   • Basophil % 01/04/2022 5.2* 0.0 - 1.5 % Final   • Immature Grans % 01/04/2022 1.7* 0.0 - 0.5 % Final   • Neutrophils, Absolute 01/04/2022 0.20* 1.70 - 7.00 10*3/mm3 Final   • Lymphocytes, Absolute 01/04/2022 0.21* 0.70 - 3.10 10*3/mm3 Final   • Monocytes, Absolute 01/04/2022 0.10  0.10 - 0.90 10*3/mm3 Final   • Eosinophils, Absolute 01/04/2022 0.03  0.00 - 0.40 10*3/mm3 Final   • Basophils, Absolute 01/04/2022 0.03  0.00 - 0.20 10*3/mm3 Final   • Immature Grans, Absolute 01/04/2022 0.01  0.00 - 0.05 10*3/mm3 Final   • nRBC 01/04/2022 0.0  0.0 - 0.2 /100 WBC Final        No results  found.    ASSESSMENT: The patient is a very pleasant 76 y.o. female  with diffuse large B-cell lymphoma    PROBLEM LIST:  1.  Diffuse large B-cell lymphoma with increased proliferation rate and pending FISH studies, stage IIIb:  A.  Presented with fatigue unexplained weight loss  B.  Diagnosed after CT-guided retroperitoneal mass biopsy done by IR November 24, 2021  C.  Bone marrow biopsy done November 24, 2021 negative for lymphoma involvement  D.  Whole-body PET scan done on November 30, 2021 revealed disease activity above and below the diaphragm with bulky mass next the left kidney.  E.  Started R-CHOP chemotherapy December 1, 2021, status post 1 cycle  2.  Weight loss  3.  Hypertension  4.  Hypercholesteremia  5. Acute kidney injury  6.  Chemotherapy-induced nausea  7.  Chemotherapy-induced anemia    PLAN:  1.  I will proceed with chemotherapy as scheduled today R-CHOP cycle #3.  2.  The patient will follow up with us in 3 weeks for cycle #4.  3.  We will plan a total of 6 cycles based on response and tolerance.  4.  I will repeat the patient's scans prior to cycle #4.  Those will be ordered prior to return.  5.  I will monitor the patient blood work including blood counts kidney function liver function and electrolytes.  6.  I will continue the patient on prednisone 100 mg day 1 through 5 of each cycle.  I will add GI prophylaxis with Prilosec 40 mg daily.  7.  The patient will finish allopurinol 300 mg daily later on this month.  8.  We discussed the potential risks and side effects of R CHOP chemotherapy including neutropenia, alopecia, nausea and vomiting, fatigue, neuropathy, cardiomyopathy, constipation, infusion reaction, and a small risk of myelodysplasia.  9.  I will start the patient on Zofran as needed for chemotherapy-induced nausea.  10.  I will monitor the patient blood pressure.  We may have to adjust metoprolol dose while she is on chemotherapy.  11.  We will continue port care.  I will give the  patient prescription for EMLA cream.  12.  I did go over the cardiac echo result with the patient reassured her she had adequate ejection fraction.  I repeat this down the road on as-needed basis.  13.  I will continue Zofran as needed for chemotherapy-induced nausea.  14.  The patient had 2 units of blood.  We will monitor hemoglobin carefully.  This is a direct toxicity of chemotherapy.  15.  I will reduce her chemotherapy doses by 25% given her severe pancytopenia.    Total time of patient care including preparation of patient chart prior to arrival, face-to-face with patient and following visit spent in reviewing records, lab results, imaging studies, discussion with patient, and documentation/charting was 40 minutes           Adelaida Al MD  1/17/2022

## 2022-01-17 NOTE — TELEPHONE ENCOUNTER
Returned Louis's phone call.  He advised he wanted me to let Dr. Al know that patient comes in saying she is doing more than she actually is and is spending 90% of her time on the couch.  I encouraged if they are able to make sure they encourage her to get up and do things at least every couple of hours.  They asked for refill on remeron and Dr. Al advised ok to send in.  He also asked about what patients lifespan is with treatment.  I advised I asked Dr. Al who said we will know more after her scans but that she is potentially curable.  He verbalized understanding.

## 2022-01-17 NOTE — TELEPHONE ENCOUNTER
Caller: SIMRAN LIZARRAGA    Relationship: SON    Best call back number: [phone]    Who are you requesting to speak with (clinical staff, provider,  specific staff member): DR. FLOYD    What was the call regarding: SIMRAN CALLED TO SAY THAT HE HAS A FEW QUESTIONS FOR DR. FLOYD. HE WOULD LIKE A CALL BACK TO DISCUSS.    Do you require a callback: YES

## 2022-01-20 DIAGNOSIS — R11.2 INTRACTABLE VOMITING WITH NAUSEA, UNSPECIFIED VOMITING TYPE: ICD-10-CM

## 2022-01-20 DIAGNOSIS — C83.33 DIFFUSE LARGE B-CELL LYMPHOMA OF INTRA-ABDOMINAL LYMPH NODES: ICD-10-CM

## 2022-01-20 RX ORDER — MEGESTROL ACETATE 40 MG/ML
SUSPENSION ORAL
Qty: 480 ML | Refills: 0 | Status: SHIPPED | OUTPATIENT
Start: 2022-01-20 | End: 2022-07-05

## 2022-01-21 ENCOUNTER — HOSPITAL ENCOUNTER (OUTPATIENT)
Dept: ONCOLOGY | Facility: HOSPITAL | Age: 77
Discharge: HOME | End: 2022-01-21
Admitting: INTERNAL MEDICINE

## 2022-01-21 VITALS
TEMPERATURE: 97.9 F | RESPIRATION RATE: 18 BRPM | DIASTOLIC BLOOD PRESSURE: 43 MMHG | SYSTOLIC BLOOD PRESSURE: 115 MMHG | HEART RATE: 70 BPM | HEIGHT: 65 IN | WEIGHT: 137 LBS | BODY MASS INDEX: 22.82 KG/M2

## 2022-01-21 DIAGNOSIS — C83.33 DIFFUSE LARGE B-CELL LYMPHOMA OF INTRA-ABDOMINAL LYMPH NODES: Primary | ICD-10-CM

## 2022-01-21 PROCEDURE — 96372 THER/PROPH/DIAG INJ SC/IM: CPT

## 2022-01-21 PROCEDURE — 25010000002 INJECTION, TIXAGEVIMAB AND CILGAVIMAB, 300 MG: Performed by: INTERNAL MEDICINE

## 2022-01-21 PROCEDURE — M0220 HC INJECTION, TIXAGEVIMAB AND CILGAVIMAB: HCPCS | Performed by: INTERNAL MEDICINE

## 2022-01-21 RX ORDER — EPINEPHRINE 1 MG/ML
0.3 INJECTION, SOLUTION INTRAMUSCULAR; SUBCUTANEOUS AS NEEDED
OUTPATIENT
Start: 2022-01-21

## 2022-01-21 RX ORDER — DIPHENHYDRAMINE HCL 50 MG
50 CAPSULE ORAL ONCE AS NEEDED
OUTPATIENT
Start: 2022-01-21

## 2022-01-21 RX ADMIN — AZD7442 150 MG: KIT at 15:15

## 2022-01-25 ENCOUNTER — TELEPHONE (OUTPATIENT)
Dept: ONCOLOGY | Facility: CLINIC | Age: 77
End: 2022-01-25

## 2022-01-25 DIAGNOSIS — C83.33 DIFFUSE LARGE B-CELL LYMPHOMA OF INTRA-ABDOMINAL LYMPH NODES: Primary | ICD-10-CM

## 2022-01-25 NOTE — TELEPHONE ENCOUNTER
Patient's son called she is very weak and shaky today. He wonders if she needs fluids? Please call.

## 2022-01-25 NOTE — TELEPHONE ENCOUNTER
Called and spoke with Nathan.  He advised she is really weak, not drinking as much as she should.  He said her urine output has decreased and she is having burning with urination.  I discussed with Dr. Al who advised to bring her in for IV fluids tomorrow and labs (cbc/cmp, and ua).  I spoke with Precious in infusion scheduling and she advised they can work her in at 7:30 tomorrow morning.  I called Nathan and he advised he will have her there then.

## 2022-01-26 ENCOUNTER — TELEPHONE (OUTPATIENT)
Dept: ONCOLOGY | Facility: CLINIC | Age: 77
End: 2022-01-26

## 2022-01-26 ENCOUNTER — HOSPITAL ENCOUNTER (OUTPATIENT)
Dept: ONCOLOGY | Facility: HOSPITAL | Age: 77
Setting detail: INFUSION SERIES
Discharge: HOME | End: 2022-01-26

## 2022-01-26 VITALS
TEMPERATURE: 98.3 F | BODY MASS INDEX: 21.99 KG/M2 | HEART RATE: 131 BPM | HEIGHT: 65 IN | SYSTOLIC BLOOD PRESSURE: 94 MMHG | WEIGHT: 132 LBS | RESPIRATION RATE: 18 BRPM | DIASTOLIC BLOOD PRESSURE: 55 MMHG

## 2022-01-26 DIAGNOSIS — C83.33 DIFFUSE LARGE B-CELL LYMPHOMA OF INTRA-ABDOMINAL LYMPH NODES: Primary | ICD-10-CM

## 2022-01-26 DIAGNOSIS — Z45.2 ENCOUNTER FOR CENTRAL LINE CARE: ICD-10-CM

## 2022-01-26 LAB
ALBUMIN SERPL-MCNC: 3.6 G/DL (ref 3.5–5.2)
ALBUMIN/GLOB SERPL: 1.3 G/DL
ALP SERPL-CCNC: 63 U/L (ref 39–117)
ALT SERPL W P-5'-P-CCNC: 9 U/L (ref 1–33)
ANION GAP SERPL CALCULATED.3IONS-SCNC: 13 MMOL/L (ref 5–15)
AST SERPL-CCNC: 15 U/L (ref 1–32)
BACTERIA UR QL AUTO: NORMAL /HPF
BILIRUB SERPL-MCNC: 0.4 MG/DL (ref 0–1.2)
BILIRUB UR QL STRIP: NEGATIVE
BUN SERPL-MCNC: 20 MG/DL (ref 8–23)
BUN/CREAT SERPL: 20.4 (ref 7–25)
CALCIUM SPEC-SCNC: 9.5 MG/DL (ref 8.6–10.5)
CHLORIDE SERPL-SCNC: 99 MMOL/L (ref 98–107)
CLARITY UR: CLEAR
CO2 SERPL-SCNC: 22 MMOL/L (ref 22–29)
COLOR UR: YELLOW
CREAT SERPL-MCNC: 0.98 MG/DL (ref 0.57–1)
ERYTHROCYTE [DISTWIDTH] IN BLOOD BY AUTOMATED COUNT: 22.3 % (ref 12.3–15.4)
GFR SERPL CREATININE-BSD FRML MDRD: 55 ML/MIN/1.73
GLOBULIN UR ELPH-MCNC: 2.8 GM/DL
GLUCOSE SERPL-MCNC: 100 MG/DL (ref 65–99)
GLUCOSE UR STRIP-MCNC: NEGATIVE MG/DL
HCT VFR BLD AUTO: 25.2 % (ref 34–46.6)
HGB BLD-MCNC: 8.5 G/DL (ref 12–15.9)
HGB UR QL STRIP.AUTO: ABNORMAL
HYALINE CASTS UR QL AUTO: NORMAL /LPF
KETONES UR QL STRIP: NEGATIVE
LEUKOCYTE ESTERASE UR QL STRIP.AUTO: NEGATIVE
LYMPHOCYTES # BLD AUTO: 0.2 10*3/MM3 (ref 0.7–3.1)
LYMPHOCYTES NFR BLD AUTO: 9.6 % (ref 19.6–45.3)
MCH RBC QN AUTO: 32.1 PG (ref 26.6–33)
MCHC RBC AUTO-ENTMCNC: 33.6 G/DL (ref 31.5–35.7)
MCV RBC AUTO: 95.5 FL (ref 79–97)
MONOCYTES # BLD AUTO: <0.1 10*3/MM3 (ref 0.1–0.9)
MONOCYTES NFR BLD AUTO: 1.3 % (ref 5–12)
NEUTROPHILS NFR BLD AUTO: 2.1 10*3/MM3 (ref 1.7–7)
NEUTROPHILS NFR BLD AUTO: 89.1 % (ref 42.7–76)
NITRITE UR QL STRIP: NEGATIVE
PH UR STRIP.AUTO: 6 [PH] (ref 5–8)
PLATELET # BLD AUTO: 45 10*3/MM3 (ref 140–450)
PMV BLD AUTO: 9.9 FL (ref 6–12)
POTASSIUM SERPL-SCNC: 3.5 MMOL/L (ref 3.5–5.2)
PROT SERPL-MCNC: 6.4 G/DL (ref 6–8.5)
PROT UR QL STRIP: ABNORMAL
RBC # BLD AUTO: 2.64 10*6/MM3 (ref 3.77–5.28)
RBC # UR STRIP: NORMAL /HPF
REF LAB TEST METHOD: NORMAL
SODIUM SERPL-SCNC: 134 MMOL/L (ref 136–145)
SP GR UR STRIP: >=1.03 (ref 1–1.03)
SQUAMOUS #/AREA URNS HPF: NORMAL /HPF
UROBILINOGEN UR QL STRIP: ABNORMAL
WBC # UR STRIP: NORMAL /HPF
WBC NRBC COR # BLD: 2.4 10*3/MM3 (ref 3.4–10.8)

## 2022-01-26 PROCEDURE — 96360 HYDRATION IV INFUSION INIT: CPT

## 2022-01-26 PROCEDURE — 80053 COMPREHEN METABOLIC PANEL: CPT | Performed by: INTERNAL MEDICINE

## 2022-01-26 PROCEDURE — 36591 DRAW BLOOD OFF VENOUS DEVICE: CPT

## 2022-01-26 PROCEDURE — 25010000002 HEPARIN LOCK FLUSH PER 10 UNITS: Performed by: INTERNAL MEDICINE

## 2022-01-26 PROCEDURE — 85025 COMPLETE CBC W/AUTO DIFF WBC: CPT | Performed by: INTERNAL MEDICINE

## 2022-01-26 PROCEDURE — 81001 URINALYSIS AUTO W/SCOPE: CPT | Performed by: INTERNAL MEDICINE

## 2022-01-26 RX ORDER — HEPARIN SODIUM (PORCINE) LOCK FLUSH IV SOLN 100 UNIT/ML 100 UNIT/ML
500 SOLUTION INTRAVENOUS AS NEEDED
Status: CANCELLED | OUTPATIENT
Start: 2022-01-26

## 2022-01-26 RX ORDER — HEPARIN SODIUM (PORCINE) LOCK FLUSH IV SOLN 100 UNIT/ML 100 UNIT/ML
500 SOLUTION INTRAVENOUS AS NEEDED
Status: DISCONTINUED | OUTPATIENT
Start: 2022-01-26 | End: 2022-01-27 | Stop reason: HOSPADM

## 2022-01-26 RX ORDER — SODIUM CHLORIDE 0.9 % (FLUSH) 0.9 %
10 SYRINGE (ML) INJECTION AS NEEDED
Status: CANCELLED | OUTPATIENT
Start: 2022-01-26

## 2022-01-26 RX ORDER — SODIUM CHLORIDE 0.9 % (FLUSH) 0.9 %
10 SYRINGE (ML) INJECTION AS NEEDED
Status: DISCONTINUED | OUTPATIENT
Start: 2022-01-26 | End: 2022-01-27 | Stop reason: HOSPADM

## 2022-01-26 RX ADMIN — Medication 500 UNITS: at 08:48

## 2022-01-26 RX ADMIN — SODIUM CHLORIDE, PRESERVATIVE FREE 10 ML: 5 INJECTION INTRAVENOUS at 08:48

## 2022-01-26 RX ADMIN — SODIUM CHLORIDE 1000 ML: 9 INJECTION, SOLUTION INTRAVENOUS at 07:41

## 2022-01-26 NOTE — TELEPHONE ENCOUNTER
----- Message from MARIA Turpin sent at 1/26/2022  9:39 AM EST -----  Regarding: RE: Dr. Mikaela lane  Yes please  ----- Message -----  From: Evette Pyle RN  Sent: 1/26/2022   9:36 AM EST  To: MARIA Turpin  Subject: RE: Dr. Mikaela lane                           She told me you said a cbc on Friday - want to repeat that too?    ----- Message -----  From: Karis Ferrer APRN  Sent: 1/26/2022   9:29 AM EST  To: Evette Pyle RN  Subject: RE: Dr. Mikaela lane                           I talked to Lupe. Just fluids and may schedule her for fluids tomorrow or Friday.   ----- Message -----  From: Evette Pyle RN  Sent: 1/26/2022   8:35 AM EST  To: MARIA Turpin, Lupe Wyatt RN  Subject: FW: Dr. Mikaela Dyson - I know counts are expected due to treatment.  Assuming no new orders.  However her heart rate is in 130's it looks like and blood pressure low.  She is getting 1 liter of fluids  Anything else?  ----- Message -----  From: Lupe Wyatt, RN  Sent: 1/26/2022   8:00 AM EST  To: [facility]  Subject: Dr. Mikaela lane                                 Vale Cee's hemoglobin is 8.5 and her platelets are 45.  I just wanted to let you know in case you wanted to order her anything. She reports shortness of breath and very weak.    Please advise   Thank You,     Lupe    [id number]

## 2022-01-27 ENCOUNTER — HOSPITAL ENCOUNTER (OUTPATIENT)
Dept: ONCOLOGY | Facility: HOSPITAL | Age: 77
Setting detail: INFUSION SERIES
Discharge: HOME | End: 2022-01-27

## 2022-01-27 VITALS
RESPIRATION RATE: 18 BRPM | DIASTOLIC BLOOD PRESSURE: 50 MMHG | TEMPERATURE: 98.5 F | HEART RATE: 99 BPM | SYSTOLIC BLOOD PRESSURE: 99 MMHG

## 2022-01-27 DIAGNOSIS — C83.33 DIFFUSE LARGE B-CELL LYMPHOMA OF INTRA-ABDOMINAL LYMPH NODES: Primary | ICD-10-CM

## 2022-01-27 DIAGNOSIS — Z45.2 ENCOUNTER FOR CENTRAL LINE CARE: ICD-10-CM

## 2022-01-27 PROCEDURE — 96360 HYDRATION IV INFUSION INIT: CPT

## 2022-01-27 PROCEDURE — 25010000002 HEPARIN LOCK FLUSH PER 10 UNITS: Performed by: INTERNAL MEDICINE

## 2022-01-27 RX ORDER — HEPARIN SODIUM (PORCINE) LOCK FLUSH IV SOLN 100 UNIT/ML 100 UNIT/ML
500 SOLUTION INTRAVENOUS AS NEEDED
OUTPATIENT
Start: 2022-01-27

## 2022-01-27 RX ORDER — SODIUM CHLORIDE 0.9 % (FLUSH) 0.9 %
10 SYRINGE (ML) INJECTION AS NEEDED
OUTPATIENT
Start: 2022-01-27

## 2022-01-27 RX ORDER — HEPARIN SODIUM (PORCINE) LOCK FLUSH IV SOLN 100 UNIT/ML 100 UNIT/ML
500 SOLUTION INTRAVENOUS AS NEEDED
Status: DISCONTINUED | OUTPATIENT
Start: 2022-01-27 | End: 2022-01-28 | Stop reason: HOSPADM

## 2022-01-27 RX ADMIN — HEPARIN 500 UNITS: 100 SYRINGE at 09:31

## 2022-01-27 RX ADMIN — SODIUM CHLORIDE 1000 ML: 9 INJECTION, SOLUTION INTRAVENOUS at 08:29

## 2022-02-01 ENCOUNTER — TELEPHONE (OUTPATIENT)
Dept: ONCOLOGY | Facility: CLINIC | Age: 77
End: 2022-02-01

## 2022-02-01 DIAGNOSIS — R19.00 RETROPERITONEAL MASS: ICD-10-CM

## 2022-02-01 DIAGNOSIS — R11.2 INTRACTABLE VOMITING WITH NAUSEA, UNSPECIFIED VOMITING TYPE: ICD-10-CM

## 2022-02-01 RX ORDER — ALLOPURINOL 300 MG/1
300 TABLET ORAL DAILY
Qty: 30 TABLET | Refills: 1 | Status: SHIPPED | OUTPATIENT
Start: 2022-02-01

## 2022-02-01 NOTE — TELEPHONE ENCOUNTER
Caller: SIMRAN LIZARRAGA    Relationship to patient: SON    Best call back number: [phone]    SIMRAN NEEDS TO SPEAK WITH NURSE. PT IS BARELY EATING OR DRINKING ANYTHING.

## 2022-02-01 NOTE — TELEPHONE ENCOUNTER
Received call back from Louis advising patient is positive for Covid.  I advised him we have to push out 20 days and will reschedule scans f/u and infusion.  He asked what to do in the meantime.  I advised to watch for worsening symptoms and she would need to go to ER if she gets worse. He verbalized understanding.

## 2022-02-01 NOTE — TELEPHONE ENCOUNTER
Louis Coleman patient's son called COVID test is scheduled should have results in one hour can the PET scan stay scheduled until results?

## 2022-02-01 NOTE — TELEPHONE ENCOUNTER
Returned Louis 's phone call and asked him about how patient is doing.  He advised she has a lot of cough and congestion that has not got better since Sunday and is getting worse.  No fevers.  She hasn't felt well enough to eat or drink either.  I discussed with Dr. Al and he advised that she needs to get tested for covid first to rule out to do her upper respiratory symptoms and if negative we can work in later this week.  I asked him to call me when they get the results.  I also sent message to Margaret to cancel pet tomorrow and try to get on for end of week in case she has a negative covid test.  Will f/u with son tomorrow.

## 2022-02-01 NOTE — TELEPHONE ENCOUNTER
Caller: SIMRAN LIZARRAGA    Relationship:     Best call back number: [phone]    Requested Prescriptions:   Requested Prescriptions     Pending Prescriptions Disp Refills   • allopurinol (Zyloprim) 300 MG tablet 30 tablet 1     Sig: Take 1 tablet by mouth Daily.        Pharmacy where request should be sent: [facility] [address] [place] [address] [phone] [facility] [phone] [facility]     Additional details provided by patient: PATIENT ONLY HAS 2 DAYS LEFT.    Does the patient have less than a 3 day supply:  [x] Yes  [] No    Peg MITCHELL Rep   02/01/22 08:44 EST

## 2022-02-02 ENCOUNTER — HOSPITAL ENCOUNTER (OUTPATIENT)
Dept: PET IMAGING | Facility: HOSPITAL | Age: 77
End: 2022-02-02

## 2022-02-02 ENCOUNTER — APPOINTMENT (OUTPATIENT)
Dept: PET IMAGING | Facility: HOSPITAL | Age: 77
End: 2022-02-02

## 2022-02-02 DIAGNOSIS — U07.1 COVID-19 VIRUS DETECTED: Primary | ICD-10-CM

## 2022-02-02 RX ORDER — DEXAMETHASONE 4 MG/1
4 TABLET ORAL 2 TIMES DAILY WITH MEALS
Qty: 10 TABLET | Refills: 0 | Status: SHIPPED | OUTPATIENT
Start: 2022-02-02 | End: 2022-02-13 | Stop reason: HOSPADM

## 2022-02-02 RX ORDER — AZITHROMYCIN 250 MG/1
TABLET, FILM COATED ORAL
Qty: 6 TABLET | Refills: 0 | Status: SHIPPED | OUTPATIENT
Start: 2022-02-02 | End: 2022-02-16

## 2022-02-02 NOTE — TELEPHONE ENCOUNTER
Louis patient's son called she has tested positive for COVID seems like she has some fluid in her lungs, he wants to know if something can be called in? Please call.

## 2022-02-02 NOTE — TELEPHONE ENCOUNTER
Called and spoke with Louis.  He advised that one of the family members is a nurse and listened to patient and she is audibly wheezy.  They are asking for an antibiotic and advised pcp said needs to come from oncology.  Discussed with Dr. lA who said to send in zpak for her with decadron 4mg twice a day for 5 days.  Advised to monitor her for worsening symptoms and if o2 gets below 92% she needs to go to ER or if symptoms overall get worse.

## 2022-02-02 NOTE — TELEPHONE ENCOUNTER
See separate encounter from yesterday. Patient is positive for covid and son was notified that we have to push out per policy 20 days.  Message sent to elsa to reschedule scans and f/u and infusion

## 2022-02-04 ENCOUNTER — APPOINTMENT (OUTPATIENT)
Dept: ONCOLOGY | Facility: HOSPITAL | Age: 77
End: 2022-02-04

## 2022-02-08 ENCOUNTER — APPOINTMENT (OUTPATIENT)
Dept: PET IMAGING | Facility: HOSPITAL | Age: 77
End: 2022-02-08

## 2022-02-11 ENCOUNTER — TELEPHONE (OUTPATIENT)
Dept: ONCOLOGY | Facility: CLINIC | Age: 77
End: 2022-02-11

## 2022-02-11 ENCOUNTER — HOSPITAL ENCOUNTER (INPATIENT)
Facility: HOSPITAL | Age: 77
LOS: 1 days | Discharge: HOME HEALTH | End: 2022-02-13
Attending: EMERGENCY MEDICINE | Admitting: INTERNAL MEDICINE

## 2022-02-11 ENCOUNTER — APPOINTMENT (OUTPATIENT)
Dept: CT IMAGING | Facility: HOSPITAL | Age: 77
End: 2022-02-11

## 2022-02-11 DIAGNOSIS — I26.99 OTHER ACUTE PULMONARY EMBOLISM WITHOUT ACUTE COR PULMONALE: Primary | ICD-10-CM

## 2022-02-11 DIAGNOSIS — U07.1 COVID-19 VIRUS INFECTION: ICD-10-CM

## 2022-02-11 DIAGNOSIS — E43 SEVERE MALNUTRITION: ICD-10-CM

## 2022-02-11 DIAGNOSIS — E86.0 DEHYDRATION: ICD-10-CM

## 2022-02-11 DIAGNOSIS — J12.82 PNEUMONIA DUE TO COVID-19 VIRUS: ICD-10-CM

## 2022-02-11 DIAGNOSIS — D64.9 ANEMIA, UNSPECIFIED TYPE: ICD-10-CM

## 2022-02-11 DIAGNOSIS — U07.1 PNEUMONIA DUE TO COVID-19 VIRUS: ICD-10-CM

## 2022-02-11 DIAGNOSIS — Z85.72 HISTORY OF NON-HODGKIN'S LYMPHOMA: ICD-10-CM

## 2022-02-11 PROBLEM — D64.81 ANEMIA DUE TO CHEMOTHERAPY: Status: ACTIVE | Noted: 2021-11-18

## 2022-02-11 PROBLEM — T45.1X5A ANEMIA DUE TO CHEMOTHERAPY: Status: ACTIVE | Noted: 2021-11-18

## 2022-02-11 LAB
ALBUMIN SERPL-MCNC: 2.8 G/DL (ref 3.5–5.2)
ALBUMIN/GLOB SERPL: 1 G/DL
ALP SERPL-CCNC: 55 U/L (ref 39–117)
ALT SERPL W P-5'-P-CCNC: 9 U/L (ref 1–33)
ANION GAP SERPL CALCULATED.3IONS-SCNC: 9 MMOL/L (ref 5–15)
AST SERPL-CCNC: 14 U/L (ref 1–32)
BACTERIA UR QL AUTO: NORMAL /HPF
BASOPHILS # BLD AUTO: 0.01 10*3/MM3 (ref 0–0.2)
BASOPHILS NFR BLD AUTO: 0.2 % (ref 0–1.5)
BILIRUB SERPL-MCNC: 0.2 MG/DL (ref 0–1.2)
BILIRUB UR QL STRIP: NEGATIVE
BUN SERPL-MCNC: 17 MG/DL (ref 8–23)
BUN/CREAT SERPL: 20.7 (ref 7–25)
CALCIUM SPEC-SCNC: 8.5 MG/DL (ref 8.6–10.5)
CHLORIDE SERPL-SCNC: 107 MMOL/L (ref 98–107)
CLARITY UR: CLEAR
CO2 SERPL-SCNC: 25 MMOL/L (ref 22–29)
COLOR UR: YELLOW
CREAT SERPL-MCNC: 0.82 MG/DL (ref 0.57–1)
CRP SERPL-MCNC: 3.5 MG/DL (ref 0–0.5)
D-LACTATE SERPL-SCNC: 1.6 MMOL/L (ref 0.5–2)
DEPRECATED RDW RBC AUTO: 70.4 FL (ref 37–54)
EOSINOPHIL # BLD AUTO: 0 10*3/MM3 (ref 0–0.4)
EOSINOPHIL NFR BLD AUTO: 0 % (ref 0.3–6.2)
ERYTHROCYTE [DISTWIDTH] IN BLOOD BY AUTOMATED COUNT: 19.9 % (ref 12.3–15.4)
ERYTHROCYTE [SEDIMENTATION RATE] IN BLOOD: 48 MM/HR (ref 0–30)
GFR SERPL CREATININE-BSD FRML MDRD: 67 ML/MIN/1.73
GLOBULIN UR ELPH-MCNC: 2.8 GM/DL
GLUCOSE SERPL-MCNC: 125 MG/DL (ref 65–99)
GLUCOSE UR STRIP-MCNC: NEGATIVE MG/DL
HCT VFR BLD AUTO: 24.5 % (ref 34–46.6)
HGB BLD-MCNC: 8.2 G/DL (ref 12–15.9)
HGB UR QL STRIP.AUTO: NEGATIVE
HYALINE CASTS UR QL AUTO: NORMAL /LPF
IMM GRANULOCYTES # BLD AUTO: 0.31 10*3/MM3 (ref 0–0.05)
IMM GRANULOCYTES NFR BLD AUTO: 4.9 % (ref 0–0.5)
INR PPP: 1.14 (ref 0.85–1.16)
KETONES UR QL STRIP: NEGATIVE
LDH SERPL-CCNC: 444 U/L (ref 135–214)
LEUKOCYTE ESTERASE UR QL STRIP.AUTO: NEGATIVE
LYMPHOCYTES # BLD AUTO: 0.29 10*3/MM3 (ref 0.7–3.1)
LYMPHOCYTES NFR BLD AUTO: 4.6 % (ref 19.6–45.3)
MAGNESIUM SERPL-MCNC: 2 MG/DL (ref 1.6–2.4)
MCH RBC QN AUTO: 32.7 PG (ref 26.6–33)
MCHC RBC AUTO-ENTMCNC: 33.5 G/DL (ref 31.5–35.7)
MCV RBC AUTO: 97.6 FL (ref 79–97)
MONOCYTES # BLD AUTO: 0.32 10*3/MM3 (ref 0.1–0.9)
MONOCYTES NFR BLD AUTO: 5.1 % (ref 5–12)
NEUTROPHILS NFR BLD AUTO: 5.36 10*3/MM3 (ref 1.7–7)
NEUTROPHILS NFR BLD AUTO: 85.2 % (ref 42.7–76)
NITRITE UR QL STRIP: NEGATIVE
NRBC BLD AUTO-RTO: 0 /100 WBC (ref 0–0.2)
NT-PROBNP SERPL-MCNC: 1152 PG/ML (ref 0–1800)
PH UR STRIP.AUTO: 7 [PH] (ref 5–8)
PLATELET # BLD AUTO: 175 10*3/MM3 (ref 140–450)
PMV BLD AUTO: 11.2 FL (ref 6–12)
POTASSIUM SERPL-SCNC: 3.5 MMOL/L (ref 3.5–5.2)
PROCALCITONIN SERPL-MCNC: 0.09 NG/ML (ref 0–0.25)
PROT SERPL-MCNC: 5.6 G/DL (ref 6–8.5)
PROT UR QL STRIP: ABNORMAL
PROTHROMBIN TIME: 14.3 SECONDS (ref 11.4–14.4)
QT INTERVAL: 390 MS
QTC INTERVAL: 435 MS
RBC # BLD AUTO: 2.51 10*6/MM3 (ref 3.77–5.28)
RBC # UR STRIP: NORMAL /HPF
REF LAB TEST METHOD: NORMAL
SODIUM SERPL-SCNC: 141 MMOL/L (ref 136–145)
SP GR UR STRIP: 1.03 (ref 1–1.03)
SQUAMOUS #/AREA URNS HPF: NORMAL /HPF
TROPONIN T SERPL-MCNC: 0.02 NG/ML (ref 0–0.03)
UROBILINOGEN UR QL STRIP: ABNORMAL
WBC # UR STRIP: NORMAL /HPF
WBC NRBC COR # BLD: 6.29 10*3/MM3 (ref 3.4–10.8)

## 2022-02-11 PROCEDURE — G0378 HOSPITAL OBSERVATION PER HR: HCPCS

## 2022-02-11 PROCEDURE — 99223 1ST HOSP IP/OBS HIGH 75: CPT | Performed by: INTERNAL MEDICINE

## 2022-02-11 PROCEDURE — 85610 PROTHROMBIN TIME: CPT | Performed by: EMERGENCY MEDICINE

## 2022-02-11 PROCEDURE — 99285 EMERGENCY DEPT VISIT HI MDM: CPT

## 2022-02-11 PROCEDURE — 87040 BLOOD CULTURE FOR BACTERIA: CPT | Performed by: EMERGENCY MEDICINE

## 2022-02-11 PROCEDURE — 80053 COMPREHEN METABOLIC PANEL: CPT | Performed by: EMERGENCY MEDICINE

## 2022-02-11 PROCEDURE — 94799 UNLISTED PULMONARY SVC/PX: CPT

## 2022-02-11 PROCEDURE — 86140 C-REACTIVE PROTEIN: CPT | Performed by: EMERGENCY MEDICINE

## 2022-02-11 PROCEDURE — 0 IOPAMIDOL PER 1 ML: Performed by: EMERGENCY MEDICINE

## 2022-02-11 PROCEDURE — 85652 RBC SED RATE AUTOMATED: CPT | Performed by: EMERGENCY MEDICINE

## 2022-02-11 PROCEDURE — 71275 CT ANGIOGRAPHY CHEST: CPT

## 2022-02-11 PROCEDURE — 93005 ELECTROCARDIOGRAM TRACING: CPT | Performed by: EMERGENCY MEDICINE

## 2022-02-11 PROCEDURE — 81001 URINALYSIS AUTO W/SCOPE: CPT | Performed by: EMERGENCY MEDICINE

## 2022-02-11 PROCEDURE — 84145 PROCALCITONIN (PCT): CPT | Performed by: EMERGENCY MEDICINE

## 2022-02-11 PROCEDURE — 25010000002 DEXAMETHASONE PER 1 MG: Performed by: EMERGENCY MEDICINE

## 2022-02-11 PROCEDURE — 83735 ASSAY OF MAGNESIUM: CPT | Performed by: EMERGENCY MEDICINE

## 2022-02-11 PROCEDURE — 94640 AIRWAY INHALATION TREATMENT: CPT

## 2022-02-11 PROCEDURE — 83605 ASSAY OF LACTIC ACID: CPT | Performed by: EMERGENCY MEDICINE

## 2022-02-11 PROCEDURE — 25010000002 ENOXAPARIN PER 10 MG: Performed by: EMERGENCY MEDICINE

## 2022-02-11 PROCEDURE — 83880 ASSAY OF NATRIURETIC PEPTIDE: CPT | Performed by: EMERGENCY MEDICINE

## 2022-02-11 PROCEDURE — 83615 LACTATE (LD) (LDH) ENZYME: CPT | Performed by: EMERGENCY MEDICINE

## 2022-02-11 PROCEDURE — 84484 ASSAY OF TROPONIN QUANT: CPT | Performed by: EMERGENCY MEDICINE

## 2022-02-11 PROCEDURE — 85025 COMPLETE CBC W/AUTO DIFF WBC: CPT | Performed by: EMERGENCY MEDICINE

## 2022-02-11 RX ORDER — ACETAMINOPHEN 500 MG
500 TABLET ORAL EVERY 6 HOURS PRN
Status: DISCONTINUED | OUTPATIENT
Start: 2022-02-11 | End: 2022-02-13 | Stop reason: HOSPADM

## 2022-02-11 RX ORDER — ALLOPURINOL 300 MG/1
300 TABLET ORAL DAILY
Status: DISCONTINUED | OUTPATIENT
Start: 2022-02-12 | End: 2022-02-13 | Stop reason: HOSPADM

## 2022-02-11 RX ORDER — ASPIRIN 81 MG/1
81 TABLET, CHEWABLE ORAL DAILY
Status: DISCONTINUED | OUTPATIENT
Start: 2022-02-12 | End: 2022-02-13 | Stop reason: HOSPADM

## 2022-02-11 RX ORDER — SODIUM CHLORIDE 0.9 % (FLUSH) 0.9 %
10 SYRINGE (ML) INJECTION EVERY 12 HOURS SCHEDULED
Status: DISCONTINUED | OUTPATIENT
Start: 2022-02-11 | End: 2022-02-13 | Stop reason: HOSPADM

## 2022-02-11 RX ORDER — BENZONATATE 100 MG/1
100 CAPSULE ORAL 3 TIMES DAILY PRN
Status: DISCONTINUED | OUTPATIENT
Start: 2022-02-11 | End: 2022-02-13 | Stop reason: HOSPADM

## 2022-02-11 RX ORDER — MEGESTROL ACETATE 40 MG/ML
800 SUSPENSION ORAL DAILY
Status: DISCONTINUED | OUTPATIENT
Start: 2022-02-12 | End: 2022-02-13 | Stop reason: HOSPADM

## 2022-02-11 RX ORDER — DEXAMETHASONE SODIUM PHOSPHATE 4 MG/ML
6 INJECTION, SOLUTION INTRA-ARTICULAR; INTRALESIONAL; INTRAMUSCULAR; INTRAVENOUS; SOFT TISSUE ONCE
Status: COMPLETED | OUTPATIENT
Start: 2022-02-11 | End: 2022-02-11

## 2022-02-11 RX ORDER — ONDANSETRON 4 MG/1
4 TABLET, FILM COATED ORAL EVERY 6 HOURS PRN
Status: DISCONTINUED | OUTPATIENT
Start: 2022-02-11 | End: 2022-02-13 | Stop reason: HOSPADM

## 2022-02-11 RX ORDER — SODIUM CHLORIDE 0.9 % (FLUSH) 0.9 %
10 SYRINGE (ML) INJECTION AS NEEDED
Status: DISCONTINUED | OUTPATIENT
Start: 2022-02-11 | End: 2022-02-13 | Stop reason: HOSPADM

## 2022-02-11 RX ORDER — ONDANSETRON 2 MG/ML
4 INJECTION INTRAMUSCULAR; INTRAVENOUS EVERY 6 HOURS PRN
Status: DISCONTINUED | OUTPATIENT
Start: 2022-02-11 | End: 2022-02-13 | Stop reason: HOSPADM

## 2022-02-11 RX ORDER — ROSUVASTATIN CALCIUM 20 MG/1
20 TABLET, COATED ORAL DAILY
Status: DISCONTINUED | OUTPATIENT
Start: 2022-02-12 | End: 2022-02-13 | Stop reason: HOSPADM

## 2022-02-11 RX ORDER — MIRTAZAPINE 15 MG/1
15 TABLET, FILM COATED ORAL NIGHTLY
Status: DISCONTINUED | OUTPATIENT
Start: 2022-02-11 | End: 2022-02-13 | Stop reason: HOSPADM

## 2022-02-11 RX ORDER — ALBUTEROL SULFATE 90 UG/1
2 AEROSOL, METERED RESPIRATORY (INHALATION)
Status: DISCONTINUED | OUTPATIENT
Start: 2022-02-11 | End: 2022-02-13 | Stop reason: HOSPADM

## 2022-02-11 RX ADMIN — ENOXAPARIN SODIUM 60 MG: 60 INJECTION SUBCUTANEOUS at 21:48

## 2022-02-11 RX ADMIN — DEXAMETHASONE SODIUM PHOSPHATE 6 MG: 4 INJECTION, SOLUTION INTRAMUSCULAR; INTRAVENOUS at 18:17

## 2022-02-11 RX ADMIN — IOPAMIDOL 84 ML: 755 INJECTION, SOLUTION INTRAVENOUS at 17:34

## 2022-02-11 RX ADMIN — NYSTATIN 500000 UNITS: 100000 SUSPENSION ORAL at 21:48

## 2022-02-11 RX ADMIN — ALBUTEROL SULFATE 2 PUFF: 90 AEROSOL, METERED RESPIRATORY (INHALATION) at 20:59

## 2022-02-11 RX ADMIN — SODIUM CHLORIDE 500 ML: 9 INJECTION, SOLUTION INTRAVENOUS at 18:17

## 2022-02-11 RX ADMIN — MIRTAZAPINE 15 MG: 15 TABLET, FILM COATED ORAL at 21:48

## 2022-02-11 NOTE — TELEPHONE ENCOUNTER
PATIENT SON CALLED TO INFORM THAT PATIENT HAS COVID AND WANTED TO SPEAK WITH CLINICAL STAFF ABOUT WHAT NEEDS TO HAPPEN NEXT WITH HER CARE PLAN. PLEASE ADVISE.

## 2022-02-11 NOTE — TELEPHONE ENCOUNTER
Called and spoke with patients son Louis.  He wanted to update us on patient after finishing her antibiotic and steroid.  He advised she is not really any better but is not any worse.  However he does report her o2 is in the mid to upper 80's at home in the mornings but comes up to low 90's during the day.  He reports she is hanging around 90-93% on her o2 She is very frail and weak as well.  Her cough continues and is non productive.  I discussed with Dr. Al who does recommend she go to ER for evaluation.  Louis verbalized understanding and they will take her there.

## 2022-02-12 ENCOUNTER — APPOINTMENT (OUTPATIENT)
Dept: CARDIOLOGY | Facility: HOSPITAL | Age: 77
End: 2022-02-12

## 2022-02-12 LAB
BH CV ECHO MEAS - AO MAX PG (FULL): 2.7 MMHG
BH CV ECHO MEAS - AO MAX PG: 5 MMHG
BH CV ECHO MEAS - AO MEAN PG (FULL): 2 MMHG
BH CV ECHO MEAS - AO MEAN PG: 3 MMHG
BH CV ECHO MEAS - AO ROOT AREA (BSA CORRECTED): 1.9
BH CV ECHO MEAS - AO ROOT AREA: 8.6 CM^2
BH CV ECHO MEAS - AO ROOT DIAM: 3.3 CM
BH CV ECHO MEAS - AO V2 MAX: 117 CM/SEC
BH CV ECHO MEAS - AO V2 MEAN: 87.2 CM/SEC
BH CV ECHO MEAS - AO V2 VTI: 22.7 CM
BH CV ECHO MEAS - AVA(I,A): 1.9 CM^2
BH CV ECHO MEAS - AVA(I,D): 1.9 CM^2
BH CV ECHO MEAS - AVA(V,A): 2.1 CM^2
BH CV ECHO MEAS - AVA(V,D): 2.1 CM^2
BH CV ECHO MEAS - BSA(HAYCOCK): 1.7 M^2
BH CV ECHO MEAS - BSA: 1.7 M^2
BH CV ECHO MEAS - BZI_BMI: 23.8 KILOGRAMS/M^2
BH CV ECHO MEAS - BZI_METRIC_HEIGHT: 165.1 CM
BH CV ECHO MEAS - BZI_METRIC_WEIGHT: 64.9 KG
BH CV ECHO MEAS - EDV(CUBED): 46.7 ML
BH CV ECHO MEAS - EDV(MOD-SP2): 80.2 ML
BH CV ECHO MEAS - EDV(MOD-SP4): 73 ML
BH CV ECHO MEAS - EDV(TEICH): 54.4 ML
BH CV ECHO MEAS - EF(CUBED): 66.5 %
BH CV ECHO MEAS - EF(MOD-BP): 56.2 %
BH CV ECHO MEAS - EF(MOD-SP2): 58.4 %
BH CV ECHO MEAS - EF(MOD-SP4): 58.1 %
BH CV ECHO MEAS - EF(TEICH): 59 %
BH CV ECHO MEAS - ESV(CUBED): 15.6 ML
BH CV ECHO MEAS - ESV(MOD-SP2): 33.4 ML
BH CV ECHO MEAS - ESV(MOD-SP4): 30.6 ML
BH CV ECHO MEAS - ESV(TEICH): 22.3 ML
BH CV ECHO MEAS - FS: 30.6 %
BH CV ECHO MEAS - IVS/LVPW: 1
BH CV ECHO MEAS - IVSD: 1 CM
BH CV ECHO MEAS - LA DIMENSION: 2.8 CM
BH CV ECHO MEAS - LA/AO: 0.85
BH CV ECHO MEAS - LAD MAJOR: 4.5 CM
BH CV ECHO MEAS - LAT PEAK E' VEL: 13.1 CM/SEC
BH CV ECHO MEAS - LATERAL E/E' RATIO: 2.9
BH CV ECHO MEAS - LV DIASTOLIC VOL/BSA (35-75): 42.6 ML/M^2
BH CV ECHO MEAS - LV MASS(C)D: 107.9 GRAMS
BH CV ECHO MEAS - LV MASS(C)DI: 62.9 GRAMS/M^2
BH CV ECHO MEAS - LV MAX PG: 2.3 MMHG
BH CV ECHO MEAS - LV MEAN PG: 1 MMHG
BH CV ECHO MEAS - LV SYSTOLIC VOL/BSA (12-30): 17.8 ML/M^2
BH CV ECHO MEAS - LV V1 MAX: 76.4 CM/SEC
BH CV ECHO MEAS - LV V1 MEAN: 56.9 CM/SEC
BH CV ECHO MEAS - LV V1 VTI: 13.4 CM
BH CV ECHO MEAS - LVIDD: 3.6 CM
BH CV ECHO MEAS - LVIDS: 2.5 CM
BH CV ECHO MEAS - LVLD AP2: 7.3 CM
BH CV ECHO MEAS - LVLD AP4: 7.3 CM
BH CV ECHO MEAS - LVLS AP2: 6.6 CM
BH CV ECHO MEAS - LVLS AP4: 5.9 CM
BH CV ECHO MEAS - LVOT AREA (M): 3.1 CM^2
BH CV ECHO MEAS - LVOT AREA: 3.1 CM^2
BH CV ECHO MEAS - LVOT DIAM: 2 CM
BH CV ECHO MEAS - LVPWD: 1 CM
BH CV ECHO MEAS - MED PEAK E' VEL: 7.6 CM/SEC
BH CV ECHO MEAS - MEDIAL E/E' RATIO: 5
BH CV ECHO MEAS - MV A MAX VEL: 54.2 CM/SEC
BH CV ECHO MEAS - MV DEC TIME: 0.21 SEC
BH CV ECHO MEAS - MV E MAX VEL: 37.9 CM/SEC
BH CV ECHO MEAS - MV E/A: 0.7
BH CV ECHO MEAS - MV MAX PG: 1.4 MMHG
BH CV ECHO MEAS - MV MEAN PG: 1 MMHG
BH CV ECHO MEAS - MV V2 MAX: 59 CM/SEC
BH CV ECHO MEAS - MV V2 MEAN: 39.2 CM/SEC
BH CV ECHO MEAS - MV V2 VTI: 14.4 CM
BH CV ECHO MEAS - MVA(VTI): 2.9 CM^2
BH CV ECHO MEAS - SI(AO): 113.2 ML/M^2
BH CV ECHO MEAS - SI(CUBED): 18.1 ML/M^2
BH CV ECHO MEAS - SI(LVOT): 24.5 ML/M^2
BH CV ECHO MEAS - SI(MOD-SP2): 27.3 ML/M^2
BH CV ECHO MEAS - SI(MOD-SP4): 24.7 ML/M^2
BH CV ECHO MEAS - SI(TEICH): 18.7 ML/M^2
BH CV ECHO MEAS - SV(AO): 194.2 ML
BH CV ECHO MEAS - SV(CUBED): 31 ML
BH CV ECHO MEAS - SV(LVOT): 42.1 ML
BH CV ECHO MEAS - SV(MOD-SP2): 46.8 ML
BH CV ECHO MEAS - SV(MOD-SP4): 42.4 ML
BH CV ECHO MEAS - SV(TEICH): 32.1 ML
BH CV ECHO MEAS - TAPSE (>1.6): 2 CM
BH CV ECHO MEASUREMENTS AVERAGE E/E' RATIO: 3.66
BH CV XLRA - RV BASE: 3.1 CM
BH CV XLRA - RV LENGTH: 5.7 CM
BH CV XLRA - RV MID: 2.2 CM
BH CV XLRA - TDI S': 11 CM/SEC
LEFT ATRIUM VOLUME INDEX: 20.8 ML/M^2
LEFT ATRIUM VOLUME: 35.6 ML
MAXIMAL PREDICTED HEART RATE: 142 BPM
STRESS TARGET HR: 121 BPM

## 2022-02-12 PROCEDURE — 97162 PT EVAL MOD COMPLEX 30 MIN: CPT

## 2022-02-12 PROCEDURE — 94799 UNLISTED PULMONARY SVC/PX: CPT

## 2022-02-12 PROCEDURE — G0378 HOSPITAL OBSERVATION PER HR: HCPCS

## 2022-02-12 PROCEDURE — 63710000001 DEXAMETHASONE PER 0.25 MG: Performed by: INTERNAL MEDICINE

## 2022-02-12 PROCEDURE — 99233 SBSQ HOSP IP/OBS HIGH 50: CPT | Performed by: INTERNAL MEDICINE

## 2022-02-12 PROCEDURE — 93306 TTE W/DOPPLER COMPLETE: CPT

## 2022-02-12 PROCEDURE — 94761 N-INVAS EAR/PLS OXIMETRY MLT: CPT

## 2022-02-12 PROCEDURE — 25010000002 ENOXAPARIN PER 10 MG

## 2022-02-12 PROCEDURE — 97116 GAIT TRAINING THERAPY: CPT

## 2022-02-12 PROCEDURE — 97166 OT EVAL MOD COMPLEX 45 MIN: CPT

## 2022-02-12 RX ADMIN — ASPIRIN 81 MG: 81 TABLET, CHEWABLE ORAL at 09:21

## 2022-02-12 RX ADMIN — DEXAMETHASONE 6 MG: 2 TABLET ORAL at 09:21

## 2022-02-12 RX ADMIN — ALLOPURINOL 300 MG: 300 TABLET ORAL at 09:21

## 2022-02-12 RX ADMIN — ENOXAPARIN SODIUM 70 MG: 80 INJECTION SUBCUTANEOUS at 20:39

## 2022-02-12 RX ADMIN — ALBUTEROL SULFATE 2 PUFF: 90 AEROSOL, METERED RESPIRATORY (INHALATION) at 12:28

## 2022-02-12 RX ADMIN — ENOXAPARIN SODIUM 70 MG: 80 INJECTION SUBCUTANEOUS at 09:21

## 2022-02-12 RX ADMIN — ALBUTEROL SULFATE 2 PUFF: 90 AEROSOL, METERED RESPIRATORY (INHALATION) at 20:04

## 2022-02-12 RX ADMIN — NYSTATIN 500000 UNITS: 100000 SUSPENSION ORAL at 09:21

## 2022-02-12 RX ADMIN — ALBUTEROL SULFATE 2 PUFF: 90 AEROSOL, METERED RESPIRATORY (INHALATION) at 15:48

## 2022-02-12 RX ADMIN — MEGESTROL ACETATE 800 MG: 40 SUSPENSION ORAL at 09:20

## 2022-02-12 RX ADMIN — NYSTATIN 500000 UNITS: 100000 SUSPENSION ORAL at 17:45

## 2022-02-12 RX ADMIN — ALBUTEROL SULFATE 2 PUFF: 90 AEROSOL, METERED RESPIRATORY (INHALATION) at 07:47

## 2022-02-12 RX ADMIN — NYSTATIN 500000 UNITS: 100000 SUSPENSION ORAL at 20:39

## 2022-02-12 RX ADMIN — MIRTAZAPINE 15 MG: 15 TABLET, FILM COATED ORAL at 20:39

## 2022-02-12 RX ADMIN — NYSTATIN 500000 UNITS: 100000 SUSPENSION ORAL at 11:18

## 2022-02-12 RX ADMIN — ROSUVASTATIN CALCIUM 20 MG: 20 TABLET, FILM COATED ORAL at 09:21

## 2022-02-12 RX ADMIN — SODIUM CHLORIDE, PRESERVATIVE FREE 10 ML: 5 INJECTION INTRAVENOUS at 20:39

## 2022-02-12 RX ADMIN — SODIUM CHLORIDE, PRESERVATIVE FREE 10 ML: 5 INJECTION INTRAVENOUS at 09:22

## 2022-02-13 ENCOUNTER — HOME HEALTH ADMISSION (OUTPATIENT)
Dept: HOME HEALTH SERVICES | Facility: HOME HEALTHCARE | Age: 77
End: 2022-02-13

## 2022-02-13 VITALS
DIASTOLIC BLOOD PRESSURE: 73 MMHG | HEART RATE: 88 BPM | SYSTOLIC BLOOD PRESSURE: 141 MMHG | OXYGEN SATURATION: 94 % | RESPIRATION RATE: 16 BRPM | TEMPERATURE: 98 F | WEIGHT: 128.25 LBS | HEIGHT: 65 IN | BODY MASS INDEX: 21.37 KG/M2

## 2022-02-13 LAB
ALBUMIN SERPL-MCNC: 3.1 G/DL (ref 3.5–5.2)
ALBUMIN/GLOB SERPL: 1.3 G/DL
ALP SERPL-CCNC: 56 U/L (ref 39–117)
ALT SERPL W P-5'-P-CCNC: 9 U/L (ref 1–33)
ANION GAP SERPL CALCULATED.3IONS-SCNC: 10 MMOL/L (ref 5–15)
ANISOCYTOSIS BLD QL: NORMAL
AST SERPL-CCNC: 13 U/L (ref 1–32)
BASOPHILS # BLD AUTO: 0.04 10*3/MM3 (ref 0–0.2)
BASOPHILS NFR BLD AUTO: 0.5 % (ref 0–1.5)
BILIRUB SERPL-MCNC: 0.3 MG/DL (ref 0–1.2)
BUN SERPL-MCNC: 27 MG/DL (ref 8–23)
BUN/CREAT SERPL: 32.1 (ref 7–25)
CALCIUM SPEC-SCNC: 8.7 MG/DL (ref 8.6–10.5)
CHLORIDE SERPL-SCNC: 107 MMOL/L (ref 98–107)
CO2 SERPL-SCNC: 24 MMOL/L (ref 22–29)
CREAT SERPL-MCNC: 0.84 MG/DL (ref 0.57–1)
CRP SERPL-MCNC: 1.34 MG/DL (ref 0–0.5)
DACRYOCYTES BLD QL SMEAR: NORMAL
DEPRECATED RDW RBC AUTO: 73 FL (ref 37–54)
EOSINOPHIL # BLD AUTO: 0 10*3/MM3 (ref 0–0.4)
EOSINOPHIL NFR BLD AUTO: 0 % (ref 0.3–6.2)
ERYTHROCYTE [DISTWIDTH] IN BLOOD BY AUTOMATED COUNT: 19.8 % (ref 12.3–15.4)
GFR SERPL CREATININE-BSD FRML MDRD: 66 ML/MIN/1.73
GLOBULIN UR ELPH-MCNC: 2.4 GM/DL
GLUCOSE SERPL-MCNC: 110 MG/DL (ref 65–99)
HCT VFR BLD AUTO: 28.9 % (ref 34–46.6)
HGB BLD-MCNC: 9.2 G/DL (ref 12–15.9)
IMM GRANULOCYTES # BLD AUTO: 0.53 10*3/MM3 (ref 0–0.05)
IMM GRANULOCYTES NFR BLD AUTO: 6.1 % (ref 0–0.5)
LYMPHOCYTES # BLD AUTO: 0.41 10*3/MM3 (ref 0.7–3.1)
LYMPHOCYTES NFR BLD AUTO: 4.8 % (ref 19.6–45.3)
MCH RBC QN AUTO: 32.3 PG (ref 26.6–33)
MCHC RBC AUTO-ENTMCNC: 31.8 G/DL (ref 31.5–35.7)
MCV RBC AUTO: 101.4 FL (ref 79–97)
MONOCYTES # BLD AUTO: 0.45 10*3/MM3 (ref 0.1–0.9)
MONOCYTES NFR BLD AUTO: 5.2 % (ref 5–12)
NEUTROPHILS NFR BLD AUTO: 7.2 10*3/MM3 (ref 1.7–7)
NEUTROPHILS NFR BLD AUTO: 83.4 % (ref 42.7–76)
NRBC BLD AUTO-RTO: 0 /100 WBC (ref 0–0.2)
PLAT MORPH BLD: NORMAL
PLATELET # BLD AUTO: 222 10*3/MM3 (ref 140–450)
PMV BLD AUTO: 11.5 FL (ref 6–12)
POTASSIUM SERPL-SCNC: 3.9 MMOL/L (ref 3.5–5.2)
PROT SERPL-MCNC: 5.5 G/DL (ref 6–8.5)
RBC # BLD AUTO: 2.85 10*6/MM3 (ref 3.77–5.28)
SCHISTOCYTES BLD QL SMEAR: NORMAL
SODIUM SERPL-SCNC: 141 MMOL/L (ref 136–145)
TOXIC GRANULATION: NORMAL
WBC NRBC COR # BLD: 8.63 10*3/MM3 (ref 3.4–10.8)

## 2022-02-13 PROCEDURE — 85007 BL SMEAR W/DIFF WBC COUNT: CPT | Performed by: INTERNAL MEDICINE

## 2022-02-13 PROCEDURE — 80053 COMPREHEN METABOLIC PANEL: CPT | Performed by: INTERNAL MEDICINE

## 2022-02-13 PROCEDURE — 94761 N-INVAS EAR/PLS OXIMETRY MLT: CPT

## 2022-02-13 PROCEDURE — 86140 C-REACTIVE PROTEIN: CPT | Performed by: INTERNAL MEDICINE

## 2022-02-13 PROCEDURE — 94799 UNLISTED PULMONARY SVC/PX: CPT

## 2022-02-13 PROCEDURE — 85025 COMPLETE CBC W/AUTO DIFF WBC: CPT | Performed by: INTERNAL MEDICINE

## 2022-02-13 PROCEDURE — 25010000002 HEPARIN LOCK FLUSH PER 10 UNITS: Performed by: INTERNAL MEDICINE

## 2022-02-13 PROCEDURE — 99239 HOSP IP/OBS DSCHRG MGMT >30: CPT | Performed by: INTERNAL MEDICINE

## 2022-02-13 PROCEDURE — 63710000001 DEXAMETHASONE PER 0.25 MG: Performed by: INTERNAL MEDICINE

## 2022-02-13 PROCEDURE — 25010000002 ENOXAPARIN PER 10 MG

## 2022-02-13 RX ORDER — ONDANSETRON 4 MG/1
4 TABLET, ORALLY DISINTEGRATING ORAL EVERY 8 HOURS PRN
Qty: 30 TABLET | Refills: 0 | Status: SHIPPED | OUTPATIENT
Start: 2022-02-13 | End: 2022-02-19 | Stop reason: HOSPADM

## 2022-02-13 RX ORDER — GUAIFENESIN/DEXTROMETHORPHAN 100-10MG/5
10 SYRUP ORAL EVERY 8 HOURS
Qty: 236 ML | Refills: 0 | Status: SHIPPED | OUTPATIENT
Start: 2022-02-13 | End: 2022-02-16

## 2022-02-13 RX ORDER — HEPARIN SODIUM (PORCINE) LOCK FLUSH IV SOLN 100 UNIT/ML 100 UNIT/ML
500 SOLUTION INTRAVENOUS ONCE
Status: COMPLETED | OUTPATIENT
Start: 2022-02-13 | End: 2022-02-13

## 2022-02-13 RX ORDER — DEXAMETHASONE 6 MG/1
6 TABLET ORAL
Qty: 7 TABLET | Refills: 0 | Status: SHIPPED | OUTPATIENT
Start: 2022-02-14 | End: 2022-02-19 | Stop reason: HOSPADM

## 2022-02-13 RX ORDER — ALBUTEROL SULFATE 90 UG/1
2 AEROSOL, METERED RESPIRATORY (INHALATION)
Qty: 18 G | Refills: 0 | Status: SHIPPED | OUTPATIENT
Start: 2022-02-13

## 2022-02-13 RX ORDER — APIXABAN 5 MG (74)
5 KIT ORAL TAKE AS DIRECTED
Qty: 74 TABLET | Refills: 0 | Status: SHIPPED | OUTPATIENT
Start: 2022-02-13 | End: 2022-02-13 | Stop reason: SDUPTHER

## 2022-02-13 RX ORDER — APIXABAN 5 MG (74)
5 KIT ORAL TAKE AS DIRECTED
Qty: 74 TABLET | Refills: 5 | Status: SHIPPED | OUTPATIENT
Start: 2022-02-13

## 2022-02-13 RX ADMIN — NYSTATIN 500000 UNITS: 100000 SUSPENSION ORAL at 11:35

## 2022-02-13 RX ADMIN — ROSUVASTATIN CALCIUM 20 MG: 20 TABLET, FILM COATED ORAL at 08:00

## 2022-02-13 RX ADMIN — NYSTATIN 500000 UNITS: 100000 SUSPENSION ORAL at 08:00

## 2022-02-13 RX ADMIN — ALLOPURINOL 300 MG: 300 TABLET ORAL at 08:00

## 2022-02-13 RX ADMIN — DEXAMETHASONE 6 MG: 2 TABLET ORAL at 08:00

## 2022-02-13 RX ADMIN — ASPIRIN 81 MG: 81 TABLET, CHEWABLE ORAL at 08:00

## 2022-02-13 RX ADMIN — ALBUTEROL SULFATE 2 PUFF: 90 AEROSOL, METERED RESPIRATORY (INHALATION) at 07:16

## 2022-02-13 RX ADMIN — MEGESTROL ACETATE 800 MG: 40 SUSPENSION ORAL at 08:01

## 2022-02-13 RX ADMIN — ENOXAPARIN SODIUM 70 MG: 80 INJECTION SUBCUTANEOUS at 08:00

## 2022-02-13 RX ADMIN — ALBUTEROL SULFATE 2 PUFF: 90 AEROSOL, METERED RESPIRATORY (INHALATION) at 12:26

## 2022-02-13 RX ADMIN — HEPARIN 500 UNITS: 100 SYRINGE at 13:43

## 2022-02-13 RX ADMIN — SODIUM CHLORIDE, PRESERVATIVE FREE 10 ML: 5 INJECTION INTRAVENOUS at 08:01

## 2022-02-14 ENCOUNTER — READMISSION MANAGEMENT (OUTPATIENT)
Dept: CALL CENTER | Facility: HOSPITAL | Age: 77
End: 2022-02-14

## 2022-02-14 ENCOUNTER — HOME CARE VISIT (OUTPATIENT)
Dept: HOME HEALTH SERVICES | Facility: HOME HEALTHCARE | Age: 77
End: 2022-02-14

## 2022-02-14 PROCEDURE — G0299 HHS/HOSPICE OF RN EA 15 MIN: HCPCS

## 2022-02-14 NOTE — OUTREACH NOTE
Prep Survey      Responses   [facility] facility patient discharged from? [place]   Is LACE score < 7 ? Yes   Emergency Room discharge w/ pulse ox? No   Eligibility Readm Mgmt   Discharge diagnosis Covid 19,  Pulmonary embolism,  diffuse large B-cell lymphoma,  anemail r/t chemotherapy   Does the patient have one of the following disease processes/diagnoses(primary or secondary)? COVID-19   Does the patient have Home health ordered? Yes   What is the Home health agency?  [facility]   Is there a DME ordered? No   Prep survey completed? Yes          Juliette Tom RN

## 2022-02-14 NOTE — OUTREACH NOTE
COVID-19 Week 1 Survey      Responses   [facility] patient discharged from? [place]   Does the patient have one of the following disease processes/diagnoses(primary or secondary)? COVID-19   COVID-19 underlying condition? None   Call Number Call 1   Week 1 Call successful? Yes   Call start time 1414   Call end time 1423   Discharge diagnosis Covid 19,  Pulmonary embolism,  diffuse large B-cell lymphoma,  anemail r/t chemotherapy   List who call center can speak with son   Medication alerts for this patient Pt is taking Eliquis.   Meds reviewed with patient/caregiver? Yes   Is the patient having any side effects they believe may be caused by any medication additions or changes? No   Does the patient have all medications ordered at discharge? Yes   Is the patient taking all medications as directed (includes completed medication regime)? Yes   Comments regarding appointments Pt waiting on return call from pcp office for appointment.   Does the patient have a primary care provider?  Yes   Has the patient kept scheduled appointments due by today? N/A   Has home health visited the patient within 72 hours of discharge? Yes   Home health comments HH and PT are visiting.   Psychosocial issues? No   Did the patient receive a copy of their discharge instructions? Yes   Did the patient receive a copy of COVID-19 specific instructions? Yes   Nursing interventions Reviewed instructions with patient   What is the patient's perception of their health status since discharge? Improving   Does the patient have any of the following symptoms? None   Pulse Ox monitoring Intermittent   O2 Sat comments Sats are wnl.   Is the patient/caregiver able to teach back the hierarchy of who to call/visit for symptoms/problems? PCP, Specialist, Home health nurse, Urgent Care, ED, 911 Yes   COVID-19 call completed? Yes   Wrap up additional comments Pt reports feeling ok but very weak. Pt eating well and living with a son. Pt using a walker at  present. HH and PT are visiting.          Justyna Burkett RN

## 2022-02-15 ENCOUNTER — READMISSION MANAGEMENT (OUTPATIENT)
Dept: CALL CENTER | Facility: HOSPITAL | Age: 77
End: 2022-02-15

## 2022-02-15 ENCOUNTER — HOME CARE VISIT (OUTPATIENT)
Dept: HOME HEALTH SERVICES | Facility: HOME HEALTHCARE | Age: 77
End: 2022-02-15

## 2022-02-15 VITALS
DIASTOLIC BLOOD PRESSURE: 69 MMHG | SYSTOLIC BLOOD PRESSURE: 118 MMHG | RESPIRATION RATE: 19 BRPM | BODY MASS INDEX: 21.38 KG/M2 | WEIGHT: 128.31 LBS | HEART RATE: 91 BPM | HEIGHT: 65 IN | OXYGEN SATURATION: 95 % | TEMPERATURE: 97.3 F

## 2022-02-15 VITALS
DIASTOLIC BLOOD PRESSURE: 68 MMHG | RESPIRATION RATE: 18 BRPM | OXYGEN SATURATION: 93 % | HEART RATE: 88 BPM | TEMPERATURE: 98.4 F | SYSTOLIC BLOOD PRESSURE: 112 MMHG

## 2022-02-15 PROCEDURE — G0151 HHCP-SERV OF PT,EA 15 MIN: HCPCS

## 2022-02-15 NOTE — HOME HEALTH
SOC ASSESSMENT COMPLETED ON 75YO FEMALE  [facility] PROVIDER  ALLERGIES:PCN AND CODEINE  COVID + QUARANTINE ENDED 2 DAYS PRIOR TO SOC DATE  PT, DAUGHTER IN LAW PRESENT AT PTS HOME   PT MAY BE SEEN AT ALTERNATE LOCATION: SONS HOME [address] [facility] [address]  PLEASANT AND COOPERATIVE PT AND FAMILY  PMHX: NONHODGKINS LYMPHOMA WITH POWER PORT FOR CHEMO R UPPER CHEST WALL AND ANEMIA DUE TO CHEMO  PT SEEN AT URGENT CARE AND TRANSFERRED TO [facility] EMERGENCY ROOM WITH PE, + COVID, COVID QUARANTINE COMPLETED   DCD FROM [facility] SUNDAY 02/13/22   PT AXOX4, VSS, PRRR, NO EDEMA NOTED, LCTA, PAIN LEVEL 0/10, LBM 02/12/22   POC APPROVAL OBTAINED FROM NIGHAT STALEY FOR [facility].  FOC FOR NSG, SAFETY, MED EDUCATION, RESP MONITORING AND MANAGMENT  PT, OT TO EVAL AND TREAT  ADD ON EVAL FOR MSW FOR Search to Phone PAYMENT ASSISTANCE AS PT STATES SHE CAN NOT AFFORD.

## 2022-02-15 NOTE — HOME HEALTH
Called patient and discussed social work referral/visit.  Patient states she just needs assistance with Eliquis costs.  She said the hospital sent her home with a month but her co-pay is $600 per month and she has to be on it for 6 months.  I called the [employer] and was told that the patient may qualify for help and she needs to call them.  I gave patient and her daughter in law the number ([phone]) and they will follow up.  Patient denies further social work needs at this time.

## 2022-02-15 NOTE — OUTREACH NOTE
COVID-19 Week 1 Survey      Responses   [facility] patient discharged from? [place]   Does the patient have one of the following disease processes/diagnoses(primary or secondary)? COVID-19   COVID-19 underlying condition? None   Call Number Call 2   Week 1 Call successful? Yes   Call start time 1224   Call end time 1227   Discharge diagnosis Covid 19,  Pulmonary embolism,  diffuse large B-cell lymphoma,  anemail r/t chemotherapy   Has the patient kept scheduled appointments due by today? N/A   Comments Has an appt on 2/22/22   Home health comments HH is coming in   What is the patient's perception of their health status since discharge? Improving   Does the patient have any of the following symptoms? Cough,  Shortness of breath  [cough is loose]   Nursing Interventions Nurse provided patient education   Pulse Ox monitoring Intermittent   Pulse Ox device source Hospital   O2 Sat comments sats are 93-94%    O2 Sat: education provided Sat levels,  When to seek care   O2 Sat education comments Keep sats at 90%,    Is the patient/caregiver able to teach back steps to recovery at home? Rest and rebuild strength, gradually increase activity,  Eat a well-balance diet   If the patient is a current smoker, are they able to teach back resources for cessation? Not a smoker  [Has not smoked in some time]   Is the patient/caregiver able to teach back the hierarchy of who to call/visit for symptoms/problems? PCP, Specialist, Home health nurse, Urgent Care, ED, 911 Yes   COVID-19 call completed? Yes   Wrap up additional comments States breathing is better. She is getting up and walking as much as she can tolerate.           Randi Luque RN

## 2022-02-15 NOTE — CASE COMMUNICATION
SOC ASSESSMENT COMPLETED ON 75YO FEMALE  [facility]OM[facility] PROVIDER  ALLERGIES:PCN AND CODEINE  COVID + QUARANTINE ENDED 2 DAYS PRIOR TO SOC DATE  PT, DAUGHTER IN LAW PRESENT AT PTS HOME   PT MAY BE SEEN AT ALTERNATE LOCATION: SONS HOME [address] [facility] [address]  [facility] AND COOPERATIVE PT AND FAMILY  PMHX: NONHODGKINS LYMPHOMA WITH POWER PORT FOR CHEMO R UPPER CHEST WALL AND ANEMIA DUE TO CHEMO  PT SEEN AT [facility] AND TRANSFERRED TO [facility] EMERGENCY ROOM WITH PE, + COVID, COVID QUARANTINE COMPLETED   DCD FROM [facility] SUNDAY 02/13/22   PT AXOX4, VSS, PRRR, NO EDEMA NOTED, LCTA, PAIN LEVEL 0/10, LBM 02/12/22   POC APPROVAL OBTAINED FROM NIGHAT STALEY FOR [facility].  FOC FOR NSG, SAFETY, MED EDUCATION, RESP MONITORING AND MANAGMENT  PT, OT TO EVAL AND TREAT  ADD ON EVAL FOR MSW FOR [employer] PAYMENT ASSISTANCE AS PT STATES SHE  CAN NOT AFFORD.

## 2022-02-15 NOTE — HOME HEALTH
Pt is a 76 y.o. female who presented to [facility] ED on 2/11/22 with c/o persistent cought, SOA and progressive weakness.  Pt had been diagnosed with Covid 19 on 2/1/2022.  She was found to have a PNA and an acute PE, treated with Lovenox. Oxygen saturation remained around 93% on room air therefore she was not sent home with supplemental O2.    She discharged home from [facility] with Eliquis  Pt also has diffuse large B-cell lymphoma followed by Dr. Al. PMH includes CVA and HTN.  Pt lives alone but is staying with her son and daughter in law temporarily. She has a FWW that she uses for mobility.

## 2022-02-16 ENCOUNTER — READMISSION MANAGEMENT (OUTPATIENT)
Dept: CALL CENTER | Facility: HOSPITAL | Age: 77
End: 2022-02-16

## 2022-02-16 ENCOUNTER — HOME CARE VISIT (OUTPATIENT)
Dept: HOME HEALTH SERVICES | Facility: HOME HEALTHCARE | Age: 77
End: 2022-02-16

## 2022-02-16 ENCOUNTER — HOSPITAL ENCOUNTER (INPATIENT)
Facility: HOSPITAL | Age: 77
LOS: 2 days | Discharge: HOSPICE | End: 2022-02-19
Attending: EMERGENCY MEDICINE | Admitting: INTERNAL MEDICINE

## 2022-02-16 ENCOUNTER — APPOINTMENT (OUTPATIENT)
Dept: GENERAL RADIOLOGY | Facility: HOSPITAL | Age: 77
End: 2022-02-16

## 2022-02-16 ENCOUNTER — APPOINTMENT (OUTPATIENT)
Dept: CT IMAGING | Facility: HOSPITAL | Age: 77
End: 2022-02-16

## 2022-02-16 ENCOUNTER — TELEPHONE (OUTPATIENT)
Dept: ONCOLOGY | Facility: CLINIC | Age: 77
End: 2022-02-16

## 2022-02-16 DIAGNOSIS — Z86.711 HISTORY OF PULMONARY EMBOLISM: ICD-10-CM

## 2022-02-16 DIAGNOSIS — K62.5 BRBPR (BRIGHT RED BLOOD PER RECTUM): ICD-10-CM

## 2022-02-16 DIAGNOSIS — J12.82 PNEUMONIA DUE TO COVID-19 VIRUS: Primary | ICD-10-CM

## 2022-02-16 DIAGNOSIS — D64.9 SYMPTOMATIC ANEMIA: ICD-10-CM

## 2022-02-16 DIAGNOSIS — U07.1 COVID-19 VIRUS INFECTION: ICD-10-CM

## 2022-02-16 DIAGNOSIS — Z85.72 HISTORY OF NON-HODGKIN'S LYMPHOMA: ICD-10-CM

## 2022-02-16 DIAGNOSIS — R19.5 FECAL OCCULT BLOOD TEST POSITIVE: ICD-10-CM

## 2022-02-16 DIAGNOSIS — D72.829 LEUKOCYTOSIS, UNSPECIFIED TYPE: ICD-10-CM

## 2022-02-16 DIAGNOSIS — D64.9 ANEMIA, UNSPECIFIED TYPE: ICD-10-CM

## 2022-02-16 DIAGNOSIS — U07.1 PNEUMONIA DUE TO COVID-19 VIRUS: Primary | ICD-10-CM

## 2022-02-16 PROBLEM — E87.20 LACTIC ACIDOSIS: Status: ACTIVE | Noted: 2022-02-16

## 2022-02-16 LAB
ABO GROUP BLD: NORMAL
ALBUMIN SERPL-MCNC: 2.9 G/DL (ref 3.5–5.2)
ALBUMIN/GLOB SERPL: 1.4 G/DL
ALP SERPL-CCNC: 46 U/L (ref 39–117)
ALT SERPL W P-5'-P-CCNC: 16 U/L (ref 1–33)
ANION GAP SERPL CALCULATED.3IONS-SCNC: 11 MMOL/L (ref 5–15)
AST SERPL-CCNC: 20 U/L (ref 1–32)
BACTERIA SPEC AEROBE CULT: NORMAL
BACTERIA SPEC AEROBE CULT: NORMAL
BASOPHILS # BLD AUTO: 0.04 10*3/MM3 (ref 0–0.2)
BASOPHILS NFR BLD AUTO: 0.2 % (ref 0–1.5)
BILIRUB SERPL-MCNC: 0.3 MG/DL (ref 0–1.2)
BLD GP AB SCN SERPL QL: NEGATIVE
BUN SERPL-MCNC: 44 MG/DL (ref 8–23)
BUN/CREAT SERPL: 57.1 (ref 7–25)
CALCIUM SPEC-SCNC: 8.5 MG/DL (ref 8.6–10.5)
CHLORIDE SERPL-SCNC: 107 MMOL/L (ref 98–107)
CO2 SERPL-SCNC: 22 MMOL/L (ref 22–29)
CREAT SERPL-MCNC: 0.77 MG/DL (ref 0.57–1)
D-LACTATE SERPL-SCNC: 1.5 MMOL/L (ref 0.5–2)
D-LACTATE SERPL-SCNC: 2.4 MMOL/L (ref 0.5–2)
DEPRECATED RDW RBC AUTO: 72.4 FL (ref 37–54)
DEVELOPER EXPIRATION DATE: ABNORMAL
DEVELOPER LOT NUMBER: ABNORMAL
EOSINOPHIL # BLD AUTO: 0 10*3/MM3 (ref 0–0.4)
EOSINOPHIL NFR BLD AUTO: 0 % (ref 0.3–6.2)
ERYTHROCYTE [DISTWIDTH] IN BLOOD BY AUTOMATED COUNT: 20.9 % (ref 12.3–15.4)
EXPIRATION DATE: ABNORMAL
FECAL OCCULT BLOOD SCREEN, POC: POSITIVE
GFR SERPL CREATININE-BSD FRML MDRD: 72 ML/MIN/1.73
GLOBULIN UR ELPH-MCNC: 2.1 GM/DL
GLUCOSE SERPL-MCNC: 128 MG/DL (ref 65–99)
HCT VFR BLD AUTO: 21.2 % (ref 34–46.6)
HGB BLD-MCNC: 7 G/DL (ref 12–15.9)
HOLD SPECIMEN: NORMAL
IMM GRANULOCYTES # BLD AUTO: 1.06 10*3/MM3 (ref 0–0.05)
IMM GRANULOCYTES NFR BLD AUTO: 5.8 % (ref 0–0.5)
INR PPP: 2.21 (ref 0.85–1.16)
LYMPHOCYTES # BLD AUTO: 0.64 10*3/MM3 (ref 0.7–3.1)
LYMPHOCYTES NFR BLD AUTO: 3.5 % (ref 19.6–45.3)
Lab: ABNORMAL
MAGNESIUM SERPL-MCNC: 2.2 MG/DL (ref 1.6–2.4)
MCH RBC QN AUTO: 33.3 PG (ref 26.6–33)
MCHC RBC AUTO-ENTMCNC: 33 G/DL (ref 31.5–35.7)
MCV RBC AUTO: 101 FL (ref 79–97)
MONOCYTES # BLD AUTO: 0.82 10*3/MM3 (ref 0.1–0.9)
MONOCYTES NFR BLD AUTO: 4.5 % (ref 5–12)
NEGATIVE CONTROL: POSITIVE
NEUTROPHILS NFR BLD AUTO: 15.62 10*3/MM3 (ref 1.7–7)
NEUTROPHILS NFR BLD AUTO: 86 % (ref 42.7–76)
NRBC BLD AUTO-RTO: 0.6 /100 WBC (ref 0–0.2)
NT-PROBNP SERPL-MCNC: 1521 PG/ML (ref 0–1800)
PLATELET # BLD AUTO: 199 10*3/MM3 (ref 140–450)
PMV BLD AUTO: 12.1 FL (ref 6–12)
POSITIVE CONTROL: POSITIVE
POTASSIUM SERPL-SCNC: 3.8 MMOL/L (ref 3.5–5.2)
PROCALCITONIN SERPL-MCNC: 0.11 NG/ML (ref 0–0.25)
PROT SERPL-MCNC: 5 G/DL (ref 6–8.5)
PROTHROMBIN TIME: 23.7 SECONDS (ref 11.4–14.4)
QT INTERVAL: 328 MS
QTC INTERVAL: 437 MS
RBC # BLD AUTO: 2.1 10*6/MM3 (ref 3.77–5.28)
RH BLD: POSITIVE
SODIUM SERPL-SCNC: 140 MMOL/L (ref 136–145)
T&S EXPIRATION DATE: NORMAL
T4 FREE SERPL-MCNC: 0.86 NG/DL (ref 0.93–1.7)
TROPONIN T SERPL-MCNC: 0.03 NG/ML (ref 0–0.03)
TSH SERPL DL<=0.05 MIU/L-ACNC: 1.01 UIU/ML (ref 0.27–4.2)
WBC NRBC COR # BLD: 18.18 10*3/MM3 (ref 3.4–10.8)
WHOLE BLOOD HOLD SPECIMEN: NORMAL
WHOLE BLOOD HOLD SPECIMEN: NORMAL

## 2022-02-16 PROCEDURE — G0378 HOSPITAL OBSERVATION PER HR: HCPCS

## 2022-02-16 PROCEDURE — 99284 EMERGENCY DEPT VISIT MOD MDM: CPT

## 2022-02-16 PROCEDURE — 93005 ELECTROCARDIOGRAM TRACING: CPT | Performed by: EMERGENCY MEDICINE

## 2022-02-16 PROCEDURE — 82270 OCCULT BLOOD FECES: CPT | Performed by: EMERGENCY MEDICINE

## 2022-02-16 PROCEDURE — 71275 CT ANGIOGRAPHY CHEST: CPT

## 2022-02-16 PROCEDURE — P9016 RBC LEUKOCYTES REDUCED: HCPCS

## 2022-02-16 PROCEDURE — 83880 ASSAY OF NATRIURETIC PEPTIDE: CPT | Performed by: EMERGENCY MEDICINE

## 2022-02-16 PROCEDURE — 99223 1ST HOSP IP/OBS HIGH 75: CPT | Performed by: INTERNAL MEDICINE

## 2022-02-16 PROCEDURE — 94640 AIRWAY INHALATION TREATMENT: CPT

## 2022-02-16 PROCEDURE — 86901 BLOOD TYPING SEROLOGIC RH(D): CPT | Performed by: EMERGENCY MEDICINE

## 2022-02-16 PROCEDURE — 83605 ASSAY OF LACTIC ACID: CPT | Performed by: EMERGENCY MEDICINE

## 2022-02-16 PROCEDURE — 36430 TRANSFUSION BLD/BLD COMPNT: CPT

## 2022-02-16 PROCEDURE — 71045 X-RAY EXAM CHEST 1 VIEW: CPT

## 2022-02-16 PROCEDURE — 86923 COMPATIBILITY TEST ELECTRIC: CPT

## 2022-02-16 PROCEDURE — 83735 ASSAY OF MAGNESIUM: CPT | Performed by: EMERGENCY MEDICINE

## 2022-02-16 PROCEDURE — 86850 RBC ANTIBODY SCREEN: CPT | Performed by: EMERGENCY MEDICINE

## 2022-02-16 PROCEDURE — 80053 COMPREHEN METABOLIC PANEL: CPT | Performed by: EMERGENCY MEDICINE

## 2022-02-16 PROCEDURE — 70450 CT HEAD/BRAIN W/O DYE: CPT

## 2022-02-16 PROCEDURE — 86900 BLOOD TYPING SEROLOGIC ABO: CPT

## 2022-02-16 PROCEDURE — 85025 COMPLETE CBC W/AUTO DIFF WBC: CPT | Performed by: EMERGENCY MEDICINE

## 2022-02-16 PROCEDURE — 84439 ASSAY OF FREE THYROXINE: CPT | Performed by: EMERGENCY MEDICINE

## 2022-02-16 PROCEDURE — 84484 ASSAY OF TROPONIN QUANT: CPT | Performed by: EMERGENCY MEDICINE

## 2022-02-16 PROCEDURE — 36415 COLL VENOUS BLD VENIPUNCTURE: CPT

## 2022-02-16 PROCEDURE — 84443 ASSAY THYROID STIM HORMONE: CPT | Performed by: EMERGENCY MEDICINE

## 2022-02-16 PROCEDURE — 85610 PROTHROMBIN TIME: CPT | Performed by: EMERGENCY MEDICINE

## 2022-02-16 PROCEDURE — 86900 BLOOD TYPING SEROLOGIC ABO: CPT | Performed by: EMERGENCY MEDICINE

## 2022-02-16 PROCEDURE — 84145 PROCALCITONIN (PCT): CPT | Performed by: EMERGENCY MEDICINE

## 2022-02-16 PROCEDURE — 87040 BLOOD CULTURE FOR BACTERIA: CPT | Performed by: EMERGENCY MEDICINE

## 2022-02-16 PROCEDURE — 0 IOPAMIDOL PER 1 ML: Performed by: EMERGENCY MEDICINE

## 2022-02-16 RX ORDER — SODIUM CHLORIDE 0.9 % (FLUSH) 0.9 %
10 SYRINGE (ML) INJECTION AS NEEDED
Status: DISCONTINUED | OUTPATIENT
Start: 2022-02-16 | End: 2022-02-19 | Stop reason: HOSPADM

## 2022-02-16 RX ORDER — LORAZEPAM 0.5 MG/1
0.5 TABLET ORAL EVERY 8 HOURS PRN
Status: DISCONTINUED | OUTPATIENT
Start: 2022-02-16 | End: 2022-02-19 | Stop reason: HOSPADM

## 2022-02-16 RX ORDER — ALLOPURINOL 300 MG/1
300 TABLET ORAL DAILY
Status: DISCONTINUED | OUTPATIENT
Start: 2022-02-17 | End: 2022-02-19 | Stop reason: HOSPADM

## 2022-02-16 RX ORDER — MIRTAZAPINE 15 MG/1
15 TABLET, FILM COATED ORAL NIGHTLY
Status: DISCONTINUED | OUTPATIENT
Start: 2022-02-16 | End: 2022-02-19 | Stop reason: HOSPADM

## 2022-02-16 RX ORDER — AMOXICILLIN 250 MG
2 CAPSULE ORAL 2 TIMES DAILY
Status: DISCONTINUED | OUTPATIENT
Start: 2022-02-16 | End: 2022-02-19 | Stop reason: HOSPADM

## 2022-02-16 RX ORDER — BISACODYL 5 MG/1
5 TABLET, DELAYED RELEASE ORAL DAILY PRN
Status: DISCONTINUED | OUTPATIENT
Start: 2022-02-16 | End: 2022-02-19 | Stop reason: HOSPADM

## 2022-02-16 RX ORDER — ACETAMINOPHEN 650 MG/1
650 SUPPOSITORY RECTAL EVERY 4 HOURS PRN
Status: DISCONTINUED | OUTPATIENT
Start: 2022-02-16 | End: 2022-02-19 | Stop reason: HOSPADM

## 2022-02-16 RX ORDER — HEPARIN SODIUM 1000 [USP'U]/ML
80 INJECTION, SOLUTION INTRAVENOUS; SUBCUTANEOUS AS NEEDED
Status: DISCONTINUED | OUTPATIENT
Start: 2022-02-16 | End: 2022-02-16

## 2022-02-16 RX ORDER — HEPARIN SODIUM 1000 [USP'U]/ML
40 INJECTION, SOLUTION INTRAVENOUS; SUBCUTANEOUS AS NEEDED
Status: DISCONTINUED | OUTPATIENT
Start: 2022-02-16 | End: 2022-02-16

## 2022-02-16 RX ORDER — ASPIRIN 81 MG/1
81 TABLET, CHEWABLE ORAL DAILY
Status: DISCONTINUED | OUTPATIENT
Start: 2022-02-16 | End: 2022-02-19 | Stop reason: HOSPADM

## 2022-02-16 RX ORDER — ACETAMINOPHEN 325 MG/1
650 TABLET ORAL EVERY 4 HOURS PRN
Status: DISCONTINUED | OUTPATIENT
Start: 2022-02-16 | End: 2022-02-19 | Stop reason: HOSPADM

## 2022-02-16 RX ORDER — POLYETHYLENE GLYCOL 3350 17 G/17G
17 POWDER, FOR SOLUTION ORAL DAILY PRN
Status: DISCONTINUED | OUTPATIENT
Start: 2022-02-16 | End: 2022-02-19 | Stop reason: HOSPADM

## 2022-02-16 RX ORDER — ACETAMINOPHEN 160 MG/5ML
650 SOLUTION ORAL EVERY 4 HOURS PRN
Status: DISCONTINUED | OUTPATIENT
Start: 2022-02-16 | End: 2022-02-19 | Stop reason: HOSPADM

## 2022-02-16 RX ORDER — SODIUM CHLORIDE, SODIUM LACTATE, POTASSIUM CHLORIDE, CALCIUM CHLORIDE 600; 310; 30; 20 MG/100ML; MG/100ML; MG/100ML; MG/100ML
75 INJECTION, SOLUTION INTRAVENOUS CONTINUOUS
Status: ACTIVE | OUTPATIENT
Start: 2022-02-16 | End: 2022-02-17

## 2022-02-16 RX ORDER — CHOLECALCIFEROL (VITAMIN D3) 125 MCG
5 CAPSULE ORAL NIGHTLY PRN
Status: DISCONTINUED | OUTPATIENT
Start: 2022-02-16 | End: 2022-02-19 | Stop reason: HOSPADM

## 2022-02-16 RX ORDER — ONDANSETRON 2 MG/ML
4 INJECTION INTRAMUSCULAR; INTRAVENOUS EVERY 6 HOURS PRN
Status: DISCONTINUED | OUTPATIENT
Start: 2022-02-16 | End: 2022-02-19 | Stop reason: HOSPADM

## 2022-02-16 RX ORDER — HEPARIN SOD,PORCINE/0.9 % NACL 25000/250
18 INTRAVENOUS SOLUTION INTRAVENOUS
Status: DISCONTINUED | OUTPATIENT
Start: 2022-02-16 | End: 2022-02-16

## 2022-02-16 RX ORDER — ONDANSETRON 4 MG/1
4 TABLET, FILM COATED ORAL EVERY 6 HOURS PRN
Status: DISCONTINUED | OUTPATIENT
Start: 2022-02-16 | End: 2022-02-19 | Stop reason: HOSPADM

## 2022-02-16 RX ORDER — SODIUM CHLORIDE 0.9 % (FLUSH) 0.9 %
10 SYRINGE (ML) INJECTION EVERY 12 HOURS SCHEDULED
Status: DISCONTINUED | OUTPATIENT
Start: 2022-02-16 | End: 2022-02-19 | Stop reason: HOSPADM

## 2022-02-16 RX ORDER — HEPARIN SODIUM (PORCINE) LOCK FLUSH IV SOLN 100 UNIT/ML 100 UNIT/ML
5 SOLUTION INTRAVENOUS AS NEEDED
Status: DISCONTINUED | OUTPATIENT
Start: 2022-02-16 | End: 2022-02-19 | Stop reason: HOSPADM

## 2022-02-16 RX ORDER — ROSUVASTATIN CALCIUM 20 MG/1
20 TABLET, COATED ORAL NIGHTLY
Status: DISCONTINUED | OUTPATIENT
Start: 2022-02-16 | End: 2022-02-19 | Stop reason: HOSPADM

## 2022-02-16 RX ORDER — ALBUTEROL SULFATE 90 UG/1
2 AEROSOL, METERED RESPIRATORY (INHALATION)
Status: DISCONTINUED | OUTPATIENT
Start: 2022-02-16 | End: 2022-02-19 | Stop reason: HOSPADM

## 2022-02-16 RX ORDER — BISACODYL 10 MG
10 SUPPOSITORY, RECTAL RECTAL DAILY PRN
Status: DISCONTINUED | OUTPATIENT
Start: 2022-02-16 | End: 2022-02-19 | Stop reason: HOSPADM

## 2022-02-16 RX ORDER — METOPROLOL SUCCINATE 25 MG/1
25 TABLET, EXTENDED RELEASE ORAL DAILY
Status: DISCONTINUED | OUTPATIENT
Start: 2022-02-17 | End: 2022-02-19 | Stop reason: HOSPADM

## 2022-02-16 RX ORDER — SODIUM CHLORIDE 0.9 % (FLUSH) 0.9 %
20 SYRINGE (ML) INJECTION AS NEEDED
Status: DISCONTINUED | OUTPATIENT
Start: 2022-02-16 | End: 2022-02-19 | Stop reason: HOSPADM

## 2022-02-16 RX ADMIN — ALBUTEROL SULFATE 2 PUFF: 90 AEROSOL, METERED RESPIRATORY (INHALATION) at 19:23

## 2022-02-16 RX ADMIN — SODIUM CHLORIDE, PRESERVATIVE FREE 10 ML: 5 INJECTION INTRAVENOUS at 20:57

## 2022-02-16 RX ADMIN — MIRTAZAPINE 15 MG: 15 TABLET, FILM COATED ORAL at 20:54

## 2022-02-16 RX ADMIN — ROSUVASTATIN CALCIUM 20 MG: 20 TABLET, FILM COATED ORAL at 20:54

## 2022-02-16 RX ADMIN — IOPAMIDOL 80 ML: 755 INJECTION, SOLUTION INTRAVENOUS at 12:17

## 2022-02-16 RX ADMIN — SODIUM CHLORIDE, POTASSIUM CHLORIDE, SODIUM LACTATE AND CALCIUM CHLORIDE 75 ML/HR: 600; 310; 30; 20 INJECTION, SOLUTION INTRAVENOUS at 17:26

## 2022-02-16 NOTE — CASE MANAGEMENT/SOCIAL WORK
Discharge Planning Assessment  [facility]     Patient Name: Vale Cee  MRN: [id number]  Today's Date: 2/16/2022    Admit Date: 2/16/2022     Discharge Needs Assessment     Row Name 02/16/22 1338       Living Environment    Lives With child(khadar), adult    Name(s) of Who Lives With Patient Louis Cee [phone]    Current Living Arrangements home/apartment/condo    Potentially Unsafe Housing Conditions unable to assess    Primary Care Provided by self; child(khadar)    Provides Primary Care For no one    Family Caregiver if Needed child(khadar), adult    Family Caregiver Names Louis Cee [phone]    Quality of Family Relationships helpful; involved    Able to Return to Prior Arrangements yes       Resource/Environmental Concerns    Transportation Concerns car, none       Transition Planning    Patient/Family Anticipates Transition to home    Transportation Anticipated family or friend will provide       Discharge Needs Assessment    Readmission Within the Last 30 Days current reason for admission unrelated to previous admission    Equipment Currently Used at Home bp cuff; pulse ox; shower chair    Concerns to be Addressed no discharge needs identified    Equipment Needed After Discharge other (see comments)  TBD    Discharge Facility/Level of Care Needs other (see comments)  TBD               Discharge Plan     Row Name 02/16/22 1350       Plan    Plan IDP    Patient/Family in Agreement with Plan yes    Plan Comments MSW spoke with pt.’s son Louis Cee [phone] by phone due to COVID. Pt. was recently discharged on 2/13/22. Pt. is currently living with her son Louis in [facility]. Pt.’s PCP is Sarah Redman. Pt.’s insurance is Medicare and [employer]. Pt.’s son reports that pt. requires assistance with all ADL’s/IADL’s as of yesterday 2/15/22. Pt. has pulse ox, bp cuff and shower chair. Pt.’s son reports that pt. does not currently have O2 but may need some because she loses her breath when she does anything.  Pt.’s son reports that pt. has HH with [facility]. Pt.’s son can provide transportation back home when pt. is medically ready. Pt.’s goal is to return home. CM will continue to follow throughout pt.’s stay.    Final Discharge Disposition Code 30 - still a patient              Continued Care and Services - Admitted Since 2/16/2022    Coordination has not been started for this encounter.     Selected Continued Care - Prior Encounters Includes selections from prior encounters from 11/18/2021 to 2/16/2022    Discharged on 2/13/2022 Admission date: 2/11/2022 - Discharge disposition[place] [facility]    [place] Medical Care     Service Provider Selected Services Address Phone Fax Patient Preferred    [facility]  Home Health Services [address] [facility] [id number] [phone] --                       Demographic Summary     Row Name 02/16/22 1336       General Information    Admission Type observation    Arrived From home    Referral Source admission list; emergency department    Reason for Consult discharge planning    Preferred Language English     Used During This Interaction no               Functional Status     Row Name 02/16/22 1337       Functional Status, IADL    Medications assistive person    Meal Preparation assistive person    Housekeeping assistive person    Laundry assistive person    Shopping assistive person    IADL Comments Pt.'s son reports that pt. was independent with AD's/IADL's until 2/15/22.       Mental Status Summary    Recent Changes in Mental Status/Cognitive Functioning unable to assess       Employment/    Employment Status retired               Psychosocial    No documentation.                Abuse/Neglect    No documentation.                Legal    No documentation.                Substance Abuse    No documentation.                Patient Forms    No documentation.                   CARMELA Servin

## 2022-02-16 NOTE — TELEPHONE ENCOUNTER
Louis patient's son called patient is short of breath everytime she gets up. Also when she gets up she is urinating on herself. When sitting back down it takes a while to get her breath back. Please call to discuss.

## 2022-02-16 NOTE — H&P
[facility] Medicine Services  HISTORY AND PHYSICAL    Patient Name: Vale Cee  : 1945  MRN: [id number]  Primary Care Physician: Sarah Redman APRN  Date of admission: 2022      Subjective   Subjective     Chief Complaint:  weakness    HPI:  Vale Cee is a 76 y.o. female with history of diffuse large B-cell lymphoma on R-CHOP, recent Covid infection, PE who presents with weakness.  Patient was recently admitted to the hospital from 2022 to 2022 for severe bilateral Covid pneumonia and an acute PE.  After she is discharged the son noticed that she had no strength all of a sudden.  Patient states that she was weak while she was in the hospital but this worsened after discharge.  She is unable to walk by herself.  She was also complaining of shortness of breath.  She has been unable to shower.  She is also had continue forgetfulness and cannot finish her thoughts.  Son reports that she is had decreased p.o. intake.  After questioning, patient admits that she had bright red blood on her toilet paper but her son was unaware of this.  Patient states that she is tired and does not think things are getting better.  She denies any dysuria.  She denies any abdominal symptoms or nausea.  She denies any pain.  Son reports that she has had weight loss.      COVID Details:    Symptoms:    [] NONE [] Fever []  Cough [x] Shortness of breath [] Change in taste/smell      Review of Systems   Constitutional: Positive for fatigue and unexpected weight change.   HENT: Negative for trouble swallowing.    Eyes: Negative for visual disturbance.   Respiratory: Positive for shortness of breath.    Cardiovascular: Negative for chest pain.   Gastrointestinal: Positive for blood in stool. Negative for abdominal pain.   Genitourinary: Negative for dysuria.   Musculoskeletal: Negative.    Skin: Negative for rash.   Neurological: Positive for weakness.          Personal History      Past Medical History:   Diagnosis Date   • Cancer (HCC)     Lymphoma   • CVA (cerebral vascular accident) (HCC)    • Fatigue    • Hypertension    • Weight loss, abnormal     30 lbs last 3 months       Past Surgical History:   Procedure Laterality Date   • CARDIAC SURGERY      loop recorder after stroke   • CATARACT EXTRACTION         Family History: family history includes COPD in her father; Cancer in her mother. Otherwise pertinent FHx was reviewed and unremarkable.     Social History:  reports that she has quit smoking. Her smoking use included cigarettes. She has a 15.00 pack-year smoking history. She has never used smokeless tobacco. She reports that she does not drink alcohol and does not use drugs.  Social History     Social History Narrative   • Not on file       Medications:  Available home medication information reviewed.  (Not in a hospital admission)      Allergies   Allergen Reactions   • Codeine Nausea And Vomiting   • Penicillins Swelling       Objective   Objective     Vital Signs:   Temp:  [98.9 °F (37.2 °C)] 98.9 °F (37.2 °C)  Heart Rate:  [] 91  Resp:  [16] 16  BP: (109-115)/(65-69) 109/68       Physical Exam   Constitutional: Thin, elderly malnourished female  HENT: NCAT, mucous membranes dry  Respiratory: Clear to auscultation bilaterally, respiratory effort normal   Cardiovascular: RRR, no murmurs, rubs, or gallops  Gastrointestinal: Positive bowel sounds, soft, nontender, nondistended  Musculoskeletal: No bilateral ankle edema  Psychiatric: Not affect  Neurologic: Oriented x 3, no focal neurological deficits, moving all extremities  Skin: Port in place, clean dry and intact      Result Review:  I have personally reviewed the results from the time of this admission to 2/16/2022 15:57 EST and agree with these findings:  [x]  Laboratory  [x]  Microbiology  [x]  Radiology  [x]  EKG/Telemetry   []  Cardiology/Vascular   []  Pathology  [x]  Old records  []  Other:        LAB RESULTS:       Lab 02/16/22  1047 02/13/22  0931 02/11/22  1611   WBC 18.18* 8.63 6.29   HEMOGLOBIN 7.0* 9.2* 8.2*   HEMATOCRIT 21.2* 28.9* 24.5*   PLATELETS 199 222 175   NEUTROS ABS 15.62* 7.20* 5.36   IMMATURE GRANS (ABS) 1.06* 0.53* 0.31*   LYMPHS ABS 0.64* 0.41* 0.29*   MONOS ABS 0.82 0.45 0.32   EOS ABS 0.00 0.00 0.00   .0* 101.4* 97.6*   SED RATE  --   --  48*   CRP  --  1.34* 3.50*   PROCALCITONIN 0.11  --  0.09   LACTATE 2.4*  --  1.6   LDH  --   --  444*   PROTIME 23.7*  --  14.3   INR 2.21*  --  1.14         Lab 02/16/22  1047 02/13/22  0931 02/11/22  1611   SODIUM 140 141 141   POTASSIUM 3.8 3.9 3.5   CHLORIDE 107 107 107   CO2 22.0 24.0 25.0   ANION GAP 11.0 10.0 9.0   BUN 44* 27* 17   CREATININE 0.77 0.84 0.82   GLUCOSE 128* 110* 125*   CALCIUM 8.5* 8.7 8.5*   MAGNESIUM 2.2  --  2.0   TSH 1.010  --   --          Lab 02/16/22  1047 02/13/22  0931 02/11/22  1611   TOTAL PROTEIN 5.0* 5.5* 5.6*   ALBUMIN 2.90* 3.10* 2.80*   GLOBULIN 2.1 2.4 2.8   ALT (SGPT) 16 9 9   AST (SGOT) 20 13 14   BILIRUBIN 0.3 0.3 0.2   ALK PHOS 46 56 55         Lab 02/16/22  1047 02/11/22  1611   PROBNP 1,521.0 1,152.0   TROPONIN T 0.030 0.021             Lab 02/16/22  1311   ABO TYPING O   RH TYPING Positive   ANTIBODY SCREEN Negative         UA    Urinalysis 11/18/21 11/18/21 1/26/22 1/26/22 2/11/22 2/11/22    0924 0924 0723 0723 1818 1818   Squamous Epithelial Cells, UA  13-20 (A)  0-2  0-2   Specific Gravity, UA 1.018  >=1.030  1.027    Ketones, UA Trace (A)  Negative  Negative    Blood, UA Small (1+) (A)  Small (1+) (A)  Negative    Leukocytes, UA Negative  Negative  Negative    Nitrite, UA Negative  Negative  Negative    RBC, UA  3-6 (A)  None Seen  0-2   WBC, UA  6-12 (A)  0-2  0-2   Bacteria, UA  3+ (A)  Trace  None Seen   (A) Abnormal value              Microbiology Results (last 10 days)     Procedure Component Value - Date/Time    Blood Culture - Blood, Hand, Right [[id number]]  (Normal) Collected: 02/11/22 1640    Lab Status:  Preliminary result Specimen: Blood from Hand, Right Updated: 02/15/22 1700     Blood Culture No growth at 4 days    Blood Culture - Blood, Chest, Left [[id number]]  (Normal) Collected: 02/11/22 1600    Lab Status: Preliminary result Specimen: Blood from Chest, Left Updated: 02/15/22 1700     Blood Culture No growth at 4 days          CT Head Without Contrast    Result Date: 2/16/2022  EXAMINATION: CT HEAD WO CONTRAST-  INDICATION: Generalized weakness  TECHNIQUE: Axial noncontrast CT of the head with multiplanar reconstruction  The radiation dose reduction device was turned on for each scan per the ALARA (As Low as Reasonably Achievable) protocol.  COMPARISON: 11/18/2021  FINDINGS: Gray-white differentiation is maintained and there is no evidence of intracranial hemorrhage, mass or mass effect. Age-related changes of the brain are present including volume loss and typical periventricular sequela of chronic small vessel ischemia. There is otherwise no evidence of intracranial hemorrhage, mass or mass effect. The ventricles are normal in size and configuration accounting for surrounding volume loss. The orbits are normal. There is fluid opacification including aerated secretions noted in the left maxillary sinus and left sphenoid chamber.      Impression: Age-related changes of the brain as above, otherwise without evidence of acute intracranial abnormality.  Aerated secretions and mucosal thickening in the left sphenoid chamber and left maxillary sinus, consistent with acute sinusitis in the correct clinical setting.   This report was finalized on 2/16/2022 1:25 PM by Kavin Sandhu.      XR Chest 1 View    Result Date: 2/16/2022  DATE OF EXAM: 2/16/2022 10:35 AM  PROCEDURE: XR CHEST 1 VW-  INDICATIONS: SOA triage protocol  COMPARISON: CT chest February 11, 2022  TECHNIQUE: Single radiographic AP view of the chest was obtained.  FINDINGS: A port catheter is noted with its tip in the superior vena cava. A cardiac  recording device is seen overlying the left chest. There are bibasilar densities which could relate to atelectasis or sequela of more recent inflammatory process. There are some vague interstitial changes at least more peripherally within the right lung. There are no definite pleural effusions. It looks like there is an element of COPD.      Impression: 1.  There are some peripheral vague densities within the right lung in addition bibasilar areas of density. This could reflect sequela to recent Covid pneumonia. Some atelectasis might also account for at least some of the appearance to the basilar areas.  This report was finalized on 2/16/2022 10:45 AM by Damaso Dutta MD.      CT Angiogram Chest    Result Date: 2/16/2022  EXAMINATION: CT ANGIOGRAM CHEST-  INDICATION: SOB  TECHNIQUE: Axial pulmonary arterial phase IV contrast enhanced CT angiogram of the chest per PE protocol. Two-dimensional reconstructions were postprocessed.  The radiation dose reduction device was turned on for each scan per the ALARA (As Low as Reasonably Achievable) protocol.  COMPARISON: 2/11/2022  FINDINGS: No pathologic axillary adenopathy or other worrisome body wall soft tissue findings in the chest. No acute findings in the partially imaged upper abdomen. No pleural or pericardial effusion. No pathologic mediastinal or hilar lymphadenopathy. Nonaneurysmal mildly atherosclerotic thoracic aorta. Evaluation of the osseous structures demonstrates no evidence of acute fracture or aggressive osseous lesion, with redemonstrated spondylosis and exaggerated kyphosis. There is redemonstration of a filling defect within the branching left lower lobe segmental pulmonary artery branch, unchanged from recent comparison. No new or more extensive coronary emboli evident. Evaluation of the lung fields redemonstrates peripheral predominant areas of groundglass opacity and reticulation compatible with Covid 19 related pneumonia.      Impression: Unchanged  small filling defect within the left lower lobe segmental branch compatible with small pulmonary embolus. There is no evidence of new or progressive pulmonary emboli. No evidence of right heart strain.  Redemonstrated findings of likely Covid 19 related pneumonia with extensive peripheral reticulation and groundglass opacity.   This report was finalized on 2/16/2022 1:28 PM by Kavin Sandhu.        Results for orders placed during the hospital encounter of 02/11/22    Adult Transthoracic Echo Complete w/ Color, Spectral and Contrast if necessary per protocol    Interpretation Summary  · Normal LV systolic function  · No significant valvular abnormality      Assessment/Plan   Assessment & Plan     Active Hospital Problems    Diagnosis  POA   • Symptomatic anemia [D64.9]  Yes   • BRBPR (bright red blood per rectum) [K62.5]  Unknown   • Lactic acidosis [E87.2]  Unknown   • Pulmonary embolism (HCC) [I26.99]  Yes   • 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) [C83.33]  Yes       Vale Cee is a 76 y.o. female with history of diffuse large B-cell lymphoma on R-CHOP, recent Covid infection, PE who presents with weakness.  Patient was recently admitted to the hospital from 2/11/2022 to 2/13/2022 for severe bilateral Covid pneumonia and an acute PE.     Failure to thrive  -Patient recently discharged from the hospital with worsening weakness, decreased p.o. intake.  -Start IV fluids, reassess for additional fluids in a.m.  -Long discussion with son about her functional status.  Discussed that her p.o. intake may not improve.  She has been on Megace but this is not even working now.  She is also weak.  I am concerned about her ability and also her want participate with therapy.  I did discuss that if her p.o. intake does not improve and her weakness not improved she may not be a candidate for chemotherapy and we may need to consider other options such as hospice.  He was understanding.  -Hold  hydrochlorothiazide  -Continue Megace  -We will place a palliative care consult, discussed with son.  -We will place a consult for PT OT although I am concerned she would not want to participate.    Lactic acidosis  -Lactate elevated at 2.4  -Continue IV fluids  -Reflex pending    Leukocytosis  -Low suspicion for infection, procalcitonin normal.  Suspect this is likely secondary to steroids.    Bright red blood per rectum  -Patient with bright red blood per rectum  -Fecal occult positive  -Hemoglobin dropped from 9.2 on 2/13 to 7 2/16  -We will give her transfusion of PRBCs  -Monitor hemoglobin every 12 hours  -GI consult.    Diffuse large B-cell lymphoma  -Consult Dr. Al, unclear if the patient will be a candidate for further chemotherapy given her functional status currently    Recent Covid  -Patient was discharged with 5 days of dexamethasone.  Will continue with an additional 3 days of dexamethasone to complete her 10-day course.  -Currently on room air  -She can be removed from precautions 2/20/2022    DVT prophylaxis:  apixiban      CODE STATUS:  DNR/DNI  There are no questions and answers to display.         Halle Winn MD  02/16/22

## 2022-02-16 NOTE — OUTREACH NOTE
COVID-19 Week 1 Survey      Responses   List of hospitals in [place] patient discharged from? [facility]   Does the patient have one of the following disease processes/diagnoses(primary or secondary)? COVID-19   COVID-19 underlying condition? None   Call Number Call 4   Week 1 Call successful? No   Revoke Readmitted   Discharge diagnosis Covid 19,  Pulmonary embolism,  diffuse large B-cell lymphoma,  anemail r/t chemotherapy          Liliana Menard RN

## 2022-02-16 NOTE — ED PROVIDER NOTES
Subjective   76-year-old female presents for evaluation of generalized weakness and fatigue.  Of note, I admitted the patient to the hospital on February 11 for COVID-19 and pulmonary embolism.  The patient has a history of non-Hodgkin's lymphoma.  She was hospitalized until February 13 at which time she was discharged home without oxygen and on a NOAC.  She states that since going home she has continued to experience worsening generalized weakness and fatigue as well as shortness of breath with exertion.  She endorses a mild cough without fever.  She denies any dark or tarry stools or bright red blood within her stool.  As a result of her ongoing symptoms, she called EMS and was brought to our facility for evaluation.  Her oxygen saturations were reportedly 93% on room air per EMS.          Review of Systems   Constitutional: Positive for fatigue.   Respiratory: Positive for cough and shortness of breath.    Neurological: Positive for weakness.   All other systems reviewed and are negative.      Past Medical History:   Diagnosis Date   • Cancer (HCC)     Lymphoma   • CVA (cerebral vascular accident) (HCC)    • Fatigue    • Hypertension    • Weight loss, abnormal     30 lbs last 3 months       Allergies   Allergen Reactions   • Codeine Nausea And Vomiting   • Penicillins Swelling       Past Surgical History:   Procedure Laterality Date   • CARDIAC SURGERY      loop recorder after stroke   • CATARACT EXTRACTION         Family History   Problem Relation Age of Onset   • Cancer Mother    • COPD Father        Social History     Socioeconomic History   • Marital status:    Tobacco Use   • Smoking status: Former Smoker     Packs/day: 0.50     Years: 30.00     Pack years: 15.00     Types: Cigarettes   • Smokeless tobacco: Never Used   • Tobacco comment: quit 2 months ago   Vaping Use   • Vaping Use: Never used   Substance and Sexual Activity   • Alcohol use: Never   • Drug use: Never   • Sexual activity: Defer  "          Objective   Physical Exam  Vitals and nursing note reviewed.   Constitutional:       Appearance: She is well-developed. She is not diaphoretic.      Comments: Chronically ill-appearing elderly female   HENT:      Head: Normocephalic and atraumatic.   Eyes:      Pupils: Pupils are equal, round, and reactive to light.   Cardiovascular:      Rate and Rhythm: Normal rate and regular rhythm.      Heart sounds: Normal heart sounds. No murmur heard.  No friction rub. No gallop.    Pulmonary:      Effort: Pulmonary effort is normal. No respiratory distress.      Breath sounds: Normal breath sounds. No wheezing or rales.   Abdominal:      General: Bowel sounds are normal. There is no distension.      Palpations: Abdomen is soft. There is no mass.      Tenderness: There is no abdominal tenderness. There is no guarding or rebound.   Musculoskeletal:         General: Normal range of motion.   Skin:     General: Skin is warm and dry.      Findings: No erythema or rash.   Neurological:      Mental Status: She is alert and oriented to person, place, and time.   Psychiatric:         Mood and Affect: Mood normal.         Thought Content: Thought content normal.         Judgment: Judgment normal.         Procedures           ED Course  ED Course as of 02/16/22 1416   Wed Feb 16, 2022   1026 76-year-old female presents for evaluation of generalized weakness, shortness of breath, and fatigue.  Of note, I admitted the patient to the hospital on February 11 for pulmonary embolism and recent COVID-19.  She was discharged on February 13 and EMS was called today after the patient was complaining of worsening dyspnea and generalized weakness.  She also states that her head feels \"foggy.\"  On arrival to the ED, the patient is chronically ill-appearing.  Her initial EKG revealed sinus tachycardia with a heart rate of 107 and no ST segments suggestive of or concerning for ischemia.  We will obtain labs and imaging, and we will " reassess following initial interventions. [DD]   1101 I personally viewed the patient's x-ray images myself, and I am in agreement with the radiologist's reading for final interpretation.     [DD]   1141 Labs remarkable for white blood cell count of 18,000 and hemoglobin of 7.  Patient typed and screened. [DD]   1242 Fecal occult blood test is positive. [DD]   1257 The patient's leukocytosis is most likely secondary to her recent steroid use.  Awaiting urinalysis results at this time.  Procalcitonin is negative.  I discussed the patient's case with Dr. Sanchez, and the patient will be admitted under her care for further evaluation and treatment.  She is aware/agreeable with the plan at this time.  Antibiotics deferred to our inpatient team. [DD]      ED Course User Index  [DD] Stuart Cannon MD                                         Recent Results (from the past 24 hour(s))   ECG 12 Lead    Collection Time: 02/16/22 10:25 AM   Result Value Ref Range    QT Interval 328 ms    QTC Interval 437 ms   Comprehensive Metabolic Panel    Collection Time: 02/16/22 10:47 AM    Specimen: Blood   Result Value Ref Range    Glucose 128 (H) 65 - 99 mg/dL    BUN 44 (H) 8 - 23 mg/dL    Creatinine 0.77 0.57 - 1.00 mg/dL    Sodium 140 136 - 145 mmol/L    Potassium 3.8 3.5 - 5.2 mmol/L    Chloride 107 98 - 107 mmol/L    CO2 22.0 22.0 - 29.0 mmol/L    Calcium 8.5 (L) 8.6 - 10.5 mg/dL    Total Protein 5.0 (L) 6.0 - 8.5 g/dL    Albumin 2.90 (L) 3.50 - 5.20 g/dL    ALT (SGPT) 16 1 - 33 U/L    AST (SGOT) 20 1 - 32 U/L    Alkaline Phosphatase 46 39 - 117 U/L    Total Bilirubin 0.3 0.0 - 1.2 mg/dL    eGFR Non African Amer 72 >60 mL/min/1.73    Globulin 2.1 gm/dL    A/G Ratio 1.4 g/dL    BUN/Creatinine Ratio 57.1 (H) 7.0 - 25.0    Anion Gap 11.0 5.0 - 15.0 mmol/L   BNP    Collection Time: 02/16/22 10:47 AM    Specimen: Blood   Result Value Ref Range    proBNP 1,521.0 0.0-1,800.0 pg/mL   Troponin    Collection Time: 02/16/22 10:47 AM     Specimen: Blood   Result Value Ref Range    Troponin T 0.030 0.000 - 0.030 ng/mL   Green Top (Gel)    Collection Time: 02/16/22 10:47 AM   Result Value Ref Range    Extra Tube Hold for add-ons.    Lavender Top    Collection Time: 02/16/22 10:47 AM   Result Value Ref Range    Extra Tube     Gold Top - SST    Collection Time: 02/16/22 10:47 AM   Result Value Ref Range    Extra Tube Hold for add-ons.    Light Blue Top    Collection Time: 02/16/22 10:47 AM   Result Value Ref Range    Extra Tube hold for add-on    CBC Auto Differential    Collection Time: 02/16/22 10:47 AM    Specimen: Blood   Result Value Ref Range    WBC 18.18 (H) 3.40 - 10.80 10*3/mm3    RBC 2.10 (L) 3.77 - 5.28 10*6/mm3    Hemoglobin 7.0 (L) 12.0 - 15.9 g/dL    Hematocrit 21.2 (L) 34.0 - 46.6 %    .0 (H) 79.0 - 97.0 fL    MCH 33.3 (H) 26.6 - 33.0 pg    MCHC 33.0 31.5 - 35.7 g/dL    RDW 20.9 (H) 12.3 - 15.4 %    RDW-SD 72.4 (H) 37.0 - 54.0 fl    MPV 12.1 (H) 6.0 - 12.0 fL    Platelets 199 140 - 450 10*3/mm3    Neutrophil % 86.0 (H) 42.7 - 76.0 %    Lymphocyte % 3.5 (L) 19.6 - 45.3 %    Monocyte % 4.5 (L) 5.0 - 12.0 %    Eosinophil % 0.0 (L) 0.3 - 6.2 %    Basophil % 0.2 0.0 - 1.5 %    Immature Grans % 5.8 (H) 0.0 - 0.5 %    Neutrophils, Absolute 15.62 (H) 1.70 - 7.00 10*3/mm3    Lymphocytes, Absolute 0.64 (L) 0.70 - 3.10 10*3/mm3    Monocytes, Absolute 0.82 0.10 - 0.90 10*3/mm3    Eosinophils, Absolute 0.00 0.00 - 0.40 10*3/mm3    Basophils, Absolute 0.04 0.00 - 0.20 10*3/mm3    Immature Grans, Absolute 1.06 (H) 0.00 - 0.05 10*3/mm3    nRBC 0.6 (H) 0.0 - 0.2 /100 WBC   Protime-INR    Collection Time: 02/16/22 10:47 AM    Specimen: Blood   Result Value Ref Range    Protime 23.7 (H) 11.4 - 14.4 Seconds    INR 2.21 (H) 0.85 - 1.16   Magnesium    Collection Time: 02/16/22 10:47 AM    Specimen: Blood   Result Value Ref Range    Magnesium 2.2 1.6 - 2.4 mg/dL   T4, Free    Collection Time: 02/16/22 10:47 AM    Specimen: Blood   Result Value Ref Range     Free T4 0.86 (L) 0.93 - 1.70 ng/dL   TSH    Collection Time: 02/16/22 10:47 AM    Specimen: Blood   Result Value Ref Range    TSH 1.010 0.270 - 4.200 uIU/mL   Lactic Acid, Plasma    Collection Time: 02/16/22 10:47 AM    Specimen: Blood   Result Value Ref Range    Lactate 2.4 (C) 0.5 - 2.0 mmol/L   Procalcitonin    Collection Time: 02/16/22 10:47 AM    Specimen: Blood   Result Value Ref Range    Procalcitonin 0.11 0.00 - 0.25 ng/mL   POC Occult Blood Stool    Collection Time: 02/16/22 12:38 PM    Specimen: Per Rectum; Stool   Result Value Ref Range    Fecal Occult Blood Positive (A) Negative    Lot Number [id number]     Expiration Date 9-24     DEVELOPER LOT NUMBER [id number]     DEVELOPER EXPIRATION DATE 6/24     Positive Control Positive Positive    Negative Control Positive (A) Negative   Type & Screen    Collection Time: 02/16/22  1:11 PM    Specimen: Blood   Result Value Ref Range    ABO Type O     RH type Positive     Antibody Screen Negative     T&S Expiration Date 2/19/2022 11:59:59 PM      Note: In addition to lab results from this visit, the labs listed above may include labs taken at another facility or during a different encounter within the last 24 hours. Please correlate lab times with ED admission and discharge times for further clarification of the services performed during this visit.    CT Angiogram Chest   Final Result   Unchanged small filling defect within the left lower lobe   segmental branch compatible with small pulmonary embolus. There is no   evidence of new or progressive pulmonary emboli. No evidence of right   heart strain.       Redemonstrated findings of likely Covid 19 related pneumonia with   extensive peripheral reticulation and groundglass opacity.           This report was finalized on 2/16/2022 1:28 PM by Kavin Sandhu.          CT Head Without Contrast   Final Result   Age-related changes of the brain as above, otherwise without   evidence of acute intracranial abnormality.      "  Aerated secretions and mucosal thickening in the left sphenoid chamber   and left maxillary sinus, consistent with acute sinusitis in the correct   clinical setting.           This report was finalized on 2/16/2022 1:25 PM by Kavin Sandhu.          XR Chest 1 View   Final Result   1.  There are some peripheral vague densities within the right lung in   addition bibasilar areas of density. This could reflect sequela to   recent Covid pneumonia. Some atelectasis might also account for at least   some of the appearance to the basilar areas.       This report was finalized on 2/16/2022 10:45 AM by Damaso Dutta MD.            Vitals:    02/16/22 1024   BP: 115/69   BP Location: Right arm   Patient Position: Lying   Pulse: 106   Resp: 16   Temp: 98.9 °F (37.2 °C)   TempSrc: Oral   SpO2: 97%   Weight: 58.1 kg (128 lb)   Height: 165.1 cm (65\")     Medications   sodium chloride 0.9 % flush 10 mL (has no administration in time range)   iopamidol (ISOVUE-370) 76 % injection 100 mL (80 mL Intravenous Given 2/16/22 1217)     ECG/EMG Results (last 24 hours)     Procedure Component Value Units Date/Time    ECG 12 Lead [[id number]] Collected: 02/16/22 1025     Updated: 02/16/22 1247     QT Interval 328 ms      QTC Interval 437 ms     Narrative:      Test Reason : SOA Protocol  Blood Pressure :   */*   mmHG  Vent. Rate : 107 BPM     Atrial Rate : 107 BPM     P-R Int : 120 ms          QRS Dur :  82 ms      QT Int : 328 ms       P-R-T Axes :  36  11  47 degrees     QTc Int : 437 ms    Sinus tachycardia  Otherwise normal ECG  When compared with ECG of 11-FEB-2022 16:47,  No significant change was found  Confirmed by MD Kassandra, Ricky ([id number]) on 2/16/2022 12:47:02 PM    Referred By: SHON           Confirmed By: Ricky Cannon MD        ECG 12 Lead   Final Result   Test Reason : SOA Protocol   Blood Pressure :   */*   mmHG   Vent. Rate : 107 BPM     Atrial Rate : 107 BPM      P-R Int : 120 ms          QRS Dur :  82 ms       QT Int : " 328 ms       P-R-T Axes :  36  11  47 degrees      QTc Int : 437 ms      Sinus tachycardia   Otherwise normal ECG   When compared with ECG of 11-FEB-2022 16:47,   No significant change was found   Confirmed by MD Cannon Michael ([id number]) on 2/16/2022 12:47:02 PM      Referred By: SHON           Confirmed By: Ricky Cannon MD      ECG 12 Lead    (Results Pending)               MDM    Final diagnoses:   Pneumonia due to COVID-19 virus   Anemia, unspecified type   Leukocytosis, unspecified type   History of non-Hodgkin's lymphoma       ED Disposition  ED Disposition     ED Disposition Condition Comment    Decision to Admit  Level of Care: Telemetry [5]   Diagnosis: Symptomatic anemia [[id number]]   Bed Request Comments: needs COVID isolation            No follow-up provider specified.       Medication List      ASK your doctor about these medications    ondansetron ODT 4 MG disintegrating tablet  Commonly known as: Zofran ODT  Place 1 tablet on the tongue Every 8 (Eight) Hours As Needed for Nausea or Vomiting.  Ask about: Which instructions should I use?             Stuart Cannon MD  02/16/22 8411

## 2022-02-16 NOTE — TELEPHONE ENCOUNTER
- Not currently taking any medications except Norco  - Continue Norco prn pain     Returned patients son's phone call.  He advised that since the home health nurse came out yesterday afternoon that patient has been significantly more short of air. Denies chest pain.  He advised she can't go more than 10 feet without urinating on herself. She is not having any burning with urination but has frequency.  Her son does report she seems a little more confused than baseline.  She has been using the inhaler prescribed to her and taking the decadron and still significantly short of air.  He advised her o2 is hanging around 94%.  She has been compliant with her Eliquis since new diagnosis of PE as well. Her son advised it is very hard to get her to a vehicle as she is so weak. I discussed with Dr. Al and he wants her to go to ER as he is concerned about her developing covid pneumonia and possible UTI.   Her son advised he is going to call an ambulance to bring her to [facility] ER.  I called and gave report to nurse in ER as well about why we are sending her.

## 2022-02-16 NOTE — OUTREACH NOTE
COVID-19 Week 1 Survey      Responses   [facility] patient discharged from? [facility]   Does the patient have one of the following disease processes/diagnoses(primary or secondary)? COVID-19   COVID-19 underlying condition? None   Call Number Call 3   Week 1 Call successful? Yes   Call start time [phone]   Call end time [phone]   Discharge diagnosis Covid 19,  Pulmonary embolism,  diffuse large B-cell lymphoma,  anemail r/t chemotherapy   Is patient permission given to speak with other caregiver? Yes   List who call center can speak with Aristides Kenny (Nathan xochilt Myers's number [phone])   Person spoke with today (if not patient) and relationship Son Efraín    Meds reviewed with patient/caregiver? Yes   What is the patient's perception of their health status since discharge? Worsening  [pt currently at [facility] ER for SOB and weakness ]   Pulse Ox monitoring Intermittent   O2 Sat comments sats are 93-94%    COVID-19 call completed? Yes   Wrap up additional comments Nathan Myers's number d/t Louis is caring for pt-Spoke with Cherie states pt is currently in [facility] ER for weakness and increased SOB          Benita Freeman, RN

## 2022-02-17 ENCOUNTER — HOME CARE VISIT (OUTPATIENT)
Dept: HOME HEALTH SERVICES | Facility: HOME HEALTHCARE | Age: 77
End: 2022-02-17

## 2022-02-17 PROBLEM — J12.82 PNEUMONIA DUE TO COVID-19 VIRUS: Status: ACTIVE | Noted: 2022-02-17

## 2022-02-17 PROBLEM — U07.1 PNEUMONIA DUE TO COVID-19 VIRUS: Status: ACTIVE | Noted: 2022-02-17

## 2022-02-17 LAB
AMORPH URATE CRY URNS QL MICRO: ABNORMAL /HPF
ANION GAP SERPL CALCULATED.3IONS-SCNC: 9 MMOL/L (ref 5–15)
APTT PPP: 25.6 SECONDS (ref 22–39)
BACTERIA UR QL AUTO: ABNORMAL /HPF
BASOPHILS # BLD AUTO: 0.02 10*3/MM3 (ref 0–0.2)
BASOPHILS NFR BLD AUTO: 0.2 % (ref 0–1.5)
BH BB BLOOD EXPIRATION DATE: NORMAL
BH BB BLOOD TYPE BARCODE: 5100
BH BB DISPENSE STATUS: NORMAL
BH BB PRODUCT CODE: NORMAL
BH BB UNIT NUMBER: NORMAL
BILIRUB UR QL STRIP: NEGATIVE
BUN SERPL-MCNC: 42 MG/DL (ref 8–23)
BUN/CREAT SERPL: 59.2 (ref 7–25)
CALCIUM SPEC-SCNC: 8.1 MG/DL (ref 8.6–10.5)
CHLORIDE SERPL-SCNC: 108 MMOL/L (ref 98–107)
CLARITY UR: ABNORMAL
CO2 SERPL-SCNC: 23 MMOL/L (ref 22–29)
COD CRY URNS QL: ABNORMAL /HPF
COLOR UR: YELLOW
CREAT SERPL-MCNC: 0.71 MG/DL (ref 0.57–1)
CROSSMATCH INTERPRETATION: NORMAL
DEPRECATED RDW RBC AUTO: 66.4 FL (ref 37–54)
EOSINOPHIL # BLD AUTO: 0 10*3/MM3 (ref 0–0.4)
EOSINOPHIL NFR BLD AUTO: 0 % (ref 0.3–6.2)
ERYTHROCYTE [DISTWIDTH] IN BLOOD BY AUTOMATED COUNT: 20.9 % (ref 12.3–15.4)
GFR SERPL CREATININE-BSD FRML MDRD: 80 ML/MIN/1.73
GLUCOSE SERPL-MCNC: 113 MG/DL (ref 65–99)
GLUCOSE UR STRIP-MCNC: NEGATIVE MG/DL
HCT VFR BLD AUTO: 22.5 % (ref 34–46.6)
HCT VFR BLD AUTO: 23 % (ref 34–46.6)
HCT VFR BLD AUTO: 23.6 % (ref 34–46.6)
HCT VFR BLD AUTO: 24.2 % (ref 34–46.6)
HGB BLD-MCNC: 7.6 G/DL (ref 12–15.9)
HGB BLD-MCNC: 7.8 G/DL (ref 12–15.9)
HGB BLD-MCNC: 7.8 G/DL (ref 12–15.9)
HGB BLD-MCNC: 8.3 G/DL (ref 12–15.9)
HGB UR QL STRIP.AUTO: ABNORMAL
HYALINE CASTS UR QL AUTO: ABNORMAL /LPF
IMM GRANULOCYTES # BLD AUTO: 0.77 10*3/MM3 (ref 0–0.05)
IMM GRANULOCYTES NFR BLD AUTO: 7.1 % (ref 0–0.5)
INR PPP: 1.96 (ref 0.85–1.16)
KETONES UR QL STRIP: NEGATIVE
LEUKOCYTE ESTERASE UR QL STRIP.AUTO: ABNORMAL
LYMPHOCYTES # BLD AUTO: 0.49 10*3/MM3 (ref 0.7–3.1)
LYMPHOCYTES NFR BLD AUTO: 4.5 % (ref 19.6–45.3)
MCH RBC QN AUTO: 32.4 PG (ref 26.6–33)
MCHC RBC AUTO-ENTMCNC: 33.1 G/DL (ref 31.5–35.7)
MCV RBC AUTO: 97.9 FL (ref 79–97)
MONOCYTES # BLD AUTO: 0.48 10*3/MM3 (ref 0.1–0.9)
MONOCYTES NFR BLD AUTO: 4.4 % (ref 5–12)
NEUTROPHILS NFR BLD AUTO: 83.8 % (ref 42.7–76)
NEUTROPHILS NFR BLD AUTO: 9.15 10*3/MM3 (ref 1.7–7)
NITRITE UR QL STRIP: NEGATIVE
NRBC BLD AUTO-RTO: 0.7 /100 WBC (ref 0–0.2)
PH UR STRIP.AUTO: 5.5 [PH] (ref 5–8)
PLAT MORPH BLD: NORMAL
PLATELET # BLD AUTO: 156 10*3/MM3 (ref 140–450)
PMV BLD AUTO: 11.8 FL (ref 6–12)
POTASSIUM SERPL-SCNC: 4.1 MMOL/L (ref 3.5–5.2)
PROT UR QL STRIP: ABNORMAL
PROTHROMBIN TIME: 21.5 SECONDS (ref 11.4–14.4)
RBC # BLD AUTO: 2.41 10*6/MM3 (ref 3.77–5.28)
RBC # UR STRIP: ABNORMAL /HPF
RBC MORPH BLD: NORMAL
REF LAB TEST METHOD: ABNORMAL
SODIUM SERPL-SCNC: 140 MMOL/L (ref 136–145)
SP GR UR STRIP: 1.03 (ref 1–1.03)
SQUAMOUS #/AREA URNS HPF: ABNORMAL /HPF
UFH PPP CHRO-ACNC: >1.1 IU/ML (ref 0.3–0.7)
UFH PPP CHRO-ACNC: >1.1 IU/ML (ref 0.3–0.7)
UNIT  ABO: NORMAL
UNIT  RH: NORMAL
UROBILINOGEN UR QL STRIP: ABNORMAL
WBC # UR STRIP: ABNORMAL /HPF
WBC MORPH BLD: NORMAL
WBC NRBC COR # BLD: 10.91 10*3/MM3 (ref 3.4–10.8)

## 2022-02-17 PROCEDURE — 97110 THERAPEUTIC EXERCISES: CPT

## 2022-02-17 PROCEDURE — 99233 SBSQ HOSP IP/OBS HIGH 50: CPT | Performed by: INTERNAL MEDICINE

## 2022-02-17 PROCEDURE — 94799 UNLISTED PULMONARY SVC/PX: CPT

## 2022-02-17 PROCEDURE — 85730 THROMBOPLASTIN TIME PARTIAL: CPT

## 2022-02-17 PROCEDURE — 86900 BLOOD TYPING SEROLOGIC ABO: CPT

## 2022-02-17 PROCEDURE — P9016 RBC LEUKOCYTES REDUCED: HCPCS

## 2022-02-17 PROCEDURE — 97161 PT EVAL LOW COMPLEX 20 MIN: CPT

## 2022-02-17 PROCEDURE — 85014 HEMATOCRIT: CPT | Performed by: NURSE PRACTITIONER

## 2022-02-17 PROCEDURE — 85007 BL SMEAR W/DIFF WBC COUNT: CPT | Performed by: NURSE PRACTITIONER

## 2022-02-17 PROCEDURE — 85520 HEPARIN ASSAY: CPT

## 2022-02-17 PROCEDURE — 81001 URINALYSIS AUTO W/SCOPE: CPT | Performed by: INTERNAL MEDICINE

## 2022-02-17 PROCEDURE — 63710000001 DEXAMETHASONE PER 0.25 MG: Performed by: INTERNAL MEDICINE

## 2022-02-17 PROCEDURE — 85018 HEMOGLOBIN: CPT | Performed by: NURSE PRACTITIONER

## 2022-02-17 PROCEDURE — 99222 1ST HOSP IP/OBS MODERATE 55: CPT | Performed by: PHYSICIAN ASSISTANT

## 2022-02-17 PROCEDURE — 99223 1ST HOSP IP/OBS HIGH 75: CPT | Performed by: INTERNAL MEDICINE

## 2022-02-17 PROCEDURE — 85025 COMPLETE CBC W/AUTO DIFF WBC: CPT | Performed by: NURSE PRACTITIONER

## 2022-02-17 PROCEDURE — 36430 TRANSFUSION BLD/BLD COMPNT: CPT

## 2022-02-17 PROCEDURE — 85610 PROTHROMBIN TIME: CPT | Performed by: NURSE PRACTITIONER

## 2022-02-17 PROCEDURE — 80048 BASIC METABOLIC PNL TOTAL CA: CPT | Performed by: INTERNAL MEDICINE

## 2022-02-17 RX ORDER — PEG-3350, SODIUM SULFATE, SODIUM CHLORIDE, POTASSIUM CHLORIDE, SODIUM ASCORBATE AND ASCORBIC ACID 7.5-2.691G
1000 KIT ORAL
Status: DISCONTINUED | OUTPATIENT
Start: 2022-02-18 | End: 2022-02-17

## 2022-02-17 RX ORDER — PEG-3350, SODIUM SULFATE, SODIUM CHLORIDE, POTASSIUM CHLORIDE, SODIUM ASCORBATE AND ASCORBIC ACID 7.5-2.691G
1000 KIT ORAL
Status: DISCONTINUED | OUTPATIENT
Start: 2022-02-17 | End: 2022-02-17

## 2022-02-17 RX ADMIN — SODIUM CHLORIDE, PRESERVATIVE FREE 10 ML: 5 INJECTION INTRAVENOUS at 09:22

## 2022-02-17 RX ADMIN — SODIUM CHLORIDE, PRESERVATIVE FREE 10 ML: 5 INJECTION INTRAVENOUS at 20:42

## 2022-02-17 RX ADMIN — SENNOSIDES AND DOCUSATE SODIUM 2 TABLET: 50; 8.6 TABLET ORAL at 09:21

## 2022-02-17 RX ADMIN — ALLOPURINOL 300 MG: 300 TABLET ORAL at 09:21

## 2022-02-17 RX ADMIN — METOPROLOL SUCCINATE 25 MG: 25 TABLET, FILM COATED, EXTENDED RELEASE ORAL at 09:21

## 2022-02-17 RX ADMIN — Medication 5 MG: at 20:42

## 2022-02-17 RX ADMIN — ALBUTEROL SULFATE 2 PUFF: 90 AEROSOL, METERED RESPIRATORY (INHALATION) at 11:03

## 2022-02-17 RX ADMIN — DEXAMETHASONE 6 MG: 2 TABLET ORAL at 09:21

## 2022-02-17 RX ADMIN — ROSUVASTATIN CALCIUM 20 MG: 20 TABLET, FILM COATED ORAL at 20:42

## 2022-02-17 RX ADMIN — MIRTAZAPINE 15 MG: 15 TABLET, FILM COATED ORAL at 20:41

## 2022-02-17 RX ADMIN — SODIUM CHLORIDE, PRESERVATIVE FREE 10 ML: 5 INJECTION INTRAVENOUS at 21:00

## 2022-02-17 RX ADMIN — ALBUTEROL SULFATE 2 PUFF: 90 AEROSOL, METERED RESPIRATORY (INHALATION) at 15:17

## 2022-02-17 RX ADMIN — ALBUTEROL SULFATE 2 PUFF: 90 AEROSOL, METERED RESPIRATORY (INHALATION) at 19:14

## 2022-02-17 RX ADMIN — ASPIRIN 81 MG 81 MG: 81 TABLET ORAL at 09:21

## 2022-02-17 RX ADMIN — ALBUTEROL SULFATE 2 PUFF: 90 AEROSOL, METERED RESPIRATORY (INHALATION) at 07:20

## 2022-02-17 NOTE — NURSING NOTE
"Arrived to 6A from ED in stable condition. VS obtained, placed on tele (ST); Skin checked, pt oriented to room, unit routine, \"call dont fall' video. MIVF infusing via PAC (accessed in ED) at 75ml/HR. Denies pain. Due to void. Purewick placed on wall suction. Spoke with son over speaker in patient's room. Pt verbalizes understanding of present POC, son to call tomorrow after rounds for update.   "

## 2022-02-17 NOTE — PROGRESS NOTES
[facility]  PROGRESS NOTE    Patient Name: Vale Cee  : 1945  MRN: [id number]    Date of Admission: 2022  Primary Care Physician: Sarah Redman APRN    Subjective   Subjective     CC:  weakness    HPI:  Patient is concerned that she isn't going to get better. She feels week in her legs. She has some BRBPR earlier today per nursing staff    ROS:  General: denies fevers or chills  CV: denies chest pain  Resp: denies shortness of breath  Abd: denies abd pain, nausea      Objective   Objective     Vital Signs:   Temp:  [98 °F (36.7 °C)-98.7 °F (37.1 °C)] 98.2 °F (36.8 °C)  Heart Rate:  [] 86  Resp:  [16-21] 20  BP: ()/(61-79) 125/72     Physical Exam:  Constitutional: No acute distress, resting in bed, thin and frail  Respiratory: Clear to auscultation bilaterally, respiratory effort normal   Cardiovascular: RRR, no murmurs, rubs, or gallops  Gastrointestinal: Positive bowel sounds, soft, nontender, nondistended  Musculoskeletal: No bilateral ankle edema  Psychiatric: Appropriate affect, cooperative  Neurologic: Oriented x 3, no focal deficits      Results Reviewed:  LAB RESULTS:      Lab 22  1219 22  0742 22  0415 224 22  1047 22  0931 22  1611 22  1611   WBC  --   --  10.91*  --  18.18* 8.63  --  6.29   HEMOGLOBIN 7.8* 7.6* 7.8*  --  7.0* 9.2*   < > 8.2*   HEMATOCRIT 23.0* 22.5* 23.6*  --  21.2* 28.9*   < > 24.5*   PLATELETS  --   --  156  --  199 222  --  175   NEUTROS ABS  --   --  9.15*  --  15.62* 7.20*  --  5.36   IMMATURE GRANS (ABS)  --   --  0.77*  --  1.06* 0.53*  --  0.31*   LYMPHS ABS  --   --  0.49*  --  0.64* 0.41*  --  0.29*   MONOS ABS  --   --  0.48  --  0.82 0.45  --  0.32   EOS ABS  --   --  0.00  --  0.00 0.00  --  0.00   MCV  --   --  97.9*  --  101.0* 101.4*  --  97.6*   SED RATE  --   --   --   --   --   --   --  48*   CRP  --   --   --   --   --  1.34*  --  3.50*    PROCALCITONIN  --   --   --   --  0.11  --   --  0.09   LACTATE  --   --   --  1.5 2.4*  --   --  1.6   LDH  --   --   --   --   --   --   --  444*   PROTIME  --   --  21.5*  --  23.7*  --   --  14.3   APTT  --   --  25.6  --   --   --   --   --    HEPARIN ANTI-XA  --  >1.10* >1.10*  --   --   --   --   --     < > = values in this interval not displayed.         Lab 02/17/22 0415 02/16/22 1047 02/13/22  0931 02/11/22  1611   SODIUM 140 140 141 141   POTASSIUM 4.1 3.8 3.9 3.5   CHLORIDE 108* 107 107 107   CO2 23.0 22.0 24.0 25.0   ANION GAP 9.0 11.0 10.0 9.0   BUN 42* 44* 27* 17   CREATININE 0.71 0.77 0.84 0.82   GLUCOSE 113* 128* 110* 125*   CALCIUM 8.1* 8.5* 8.7 8.5*   MAGNESIUM  --  2.2  --  2.0   TSH  --  1.010  --   --          Lab 02/16/22  1047 02/13/22  0931 02/11/22  1611   TOTAL PROTEIN 5.0* 5.5* 5.6*   ALBUMIN 2.90* 3.10* 2.80*   GLOBULIN 2.1 2.4 2.8   ALT (SGPT) 16 9 9   AST (SGOT) 20 13 14   BILIRUBIN 0.3 0.3 0.2   ALK PHOS 46 56 55         Lab 02/17/22 0415 02/16/22  1047 02/11/22  1611   PROBNP  --  1,521.0 1,152.0   TROPONIN T  --  0.030 0.021   PROTIME 21.5* 23.7* 14.3   INR 1.96* 2.21* 1.14             Lab 02/16/22  1311   ABO TYPING O   RH TYPING Positive   ANTIBODY SCREEN Negative         Brief Urine Lab Results  (Last result in the past 365 days)      Color   Clarity   Blood   Leuk Est   Nitrite   Protein   CREAT   Urine HCG        02/17/22 0400 Yellow   Cloudy   Small (1+)   Trace   Negative   100 mg/dL (2+)                 Microbiology Results Abnormal     Procedure Component Value - Date/Time    Blood Culture - Blood, Arm, Right [[id number]]  (Normal) Collected: 02/16/22 1230    Lab Status: Preliminary result Specimen: Blood from Arm, Right Updated: 02/17/22 1315     Blood Culture No growth at 24 hours    Blood Culture - Blood, Arm, Left [[id number]]  (Normal) Collected: 02/16/22 1200    Lab Status: Preliminary result Specimen: Blood from Arm, Left Updated: 02/17/22 1315     Blood Culture  No growth at 24 hours          CT Head Without Contrast    Result Date: 2/16/2022  EXAMINATION: CT HEAD WO CONTRAST-  INDICATION: Generalized weakness  TECHNIQUE: Axial noncontrast CT of the head with multiplanar reconstruction  The radiation dose reduction device was turned on for each scan per the ALARA (As Low as Reasonably Achievable) protocol.  COMPARISON: 11/18/2021  FINDINGS: Gray-white differentiation is maintained and there is no evidence of intracranial hemorrhage, mass or mass effect. Age-related changes of the brain are present including volume loss and typical periventricular sequela of chronic small vessel ischemia. There is otherwise no evidence of intracranial hemorrhage, mass or mass effect. The ventricles are normal in size and configuration accounting for surrounding volume loss. The orbits are normal. There is fluid opacification including aerated secretions noted in the left maxillary sinus and left sphenoid chamber.      Impression: Age-related changes of the brain as above, otherwise without evidence of acute intracranial abnormality.  Aerated secretions and mucosal thickening in the left sphenoid chamber and left maxillary sinus, consistent with acute sinusitis in the correct clinical setting.   This report was finalized on 2/16/2022 1:25 PM by Kavin Sandhu.      XR Chest 1 View    Result Date: 2/16/2022  DATE OF EXAM: 2/16/2022 10:35 AM  PROCEDURE: XR CHEST 1 VW-  INDICATIONS: SOA triage protocol  COMPARISON: CT chest February 11, 2022  TECHNIQUE: Single radiographic AP view of the chest was obtained.  FINDINGS: A port catheter is noted with its tip in the superior vena cava. A cardiac recording device is seen overlying the left chest. There are bibasilar densities which could relate to atelectasis or sequela of more recent inflammatory process. There are some vague interstitial changes at least more peripherally within the right lung. There are no definite pleural effusions. It looks  like there is an element of COPD.      Impression: 1.  There are some peripheral vague densities within the right lung in addition bibasilar areas of density. This could reflect sequela to recent Covid pneumonia. Some atelectasis might also account for at least some of the appearance to the basilar areas.  This report was finalized on 2/16/2022 10:45 AM by Damaso Dutta MD.      CT Angiogram Chest    Result Date: 2/16/2022  EXAMINATION: CT ANGIOGRAM CHEST-  INDICATION: SOB  TECHNIQUE: Axial pulmonary arterial phase IV contrast enhanced CT angiogram of the chest per PE protocol. Two-dimensional reconstructions were postprocessed.  The radiation dose reduction device was turned on for each scan per the ALARA (As Low as Reasonably Achievable) protocol.  COMPARISON: 2/11/2022  FINDINGS: No pathologic axillary adenopathy or other worrisome body wall soft tissue findings in the chest. No acute findings in the partially imaged upper abdomen. No pleural or pericardial effusion. No pathologic mediastinal or hilar lymphadenopathy. Nonaneurysmal mildly atherosclerotic thoracic aorta. Evaluation of the osseous structures demonstrates no evidence of acute fracture or aggressive osseous lesion, with redemonstrated spondylosis and exaggerated kyphosis. There is redemonstration of a filling defect within the branching left lower lobe segmental pulmonary artery branch, unchanged from recent comparison. No new or more extensive coronary emboli evident. Evaluation of the lung fields redemonstrates peripheral predominant areas of groundglass opacity and reticulation compatible with Covid 19 related pneumonia.      Impression: Unchanged small filling defect within the left lower lobe segmental branch compatible with small pulmonary embolus. There is no evidence of new or progressive pulmonary emboli. No evidence of right heart strain.  Redemonstrated findings of likely Covid 19 related pneumonia with extensive peripheral reticulation and  groundglass opacity.   This report was finalized on 2/16/2022 1:28 PM by Kavin Sandhu.        Results for orders placed during the hospital encounter of 02/11/22    Adult Transthoracic Echo Complete w/ Color, Spectral and Contrast if necessary per protocol    Interpretation Summary  · Normal LV systolic function  · No significant valvular abnormality      I have reviewed the medications:  Scheduled Meds:!NOT ORDERED- apixaban (ELIQUIS), , Does not apply, Daily With Dinner  albuterol sulfate HFA, 2 puff, Inhalation, 4x Daily - RT  allopurinol, 300 mg, Oral, Daily  aspirin, 81 mg, Oral, Daily  dexamethasone, 6 mg, Oral, Daily With Breakfast  metoprolol succinate XL, 25 mg, Oral, Daily  mirtazapine, 15 mg, Oral, Nightly  PEG-KCl-NaCl-NaSulf-Na Asc-C, 1,000 mL, Oral, Once When Specified   Followed by  [START ON 2/18/2022] PEG-KCl-NaCl-NaSulf-Na Asc-C, 1,000 mL, Oral, Once When Specified  rosuvastatin, 20 mg, Oral, Nightly  senna-docusate sodium, 2 tablet, Oral, BID  sodium chloride, 10 mL, Intravenous, Q12H  sodium chloride, 10 mL, Intravenous, Q12H      Continuous Infusions:lactated ringers, 75 mL/hr, Last Rate: 75 mL/hr (02/17/22 1040)      PRN Meds:.acetaminophen **OR** acetaminophen **OR** acetaminophen  •  senna-docusate sodium **AND** polyethylene glycol **AND** bisacodyl **AND** bisacodyl  •  heparin  •  LORazepam  •  melatonin  •  ondansetron **OR** ondansetron  •  sodium chloride  •  sodium chloride  •  sodium chloride  •  sodium chloride    Assessment/Plan   Assessment & Plan     Active Hospital Problems    Diagnosis  POA   • Pneumonia due to COVID-19 virus [U07.1, J12.82]  Yes   • Symptomatic anemia [D64.9]  Yes   • BRBPR (bright red blood per rectum) [K62.5]  Unknown   • Lactic acidosis [E87.2]  Unknown   • Pulmonary embolism (HCC) [I26.99]  Yes   • 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) [C83.33]  Yes      Resolved Hospital Problems   No resolved problems to display.         Brief Hospital Course to date:  Vale Cee is a 76 y.o. female with history of diffuse large B-cell lymphoma on R-CHOP, recent Covid infection, PE who presents with weakness.  Patient was recently admitted to the hospital from 2/11/2022 to 2/13/2022 for severe bilateral Covid pneumonia and an acute PE.      Failure to thrive  -Patient recently discharged from the hospital with worsening weakness, decreased p.o. intake.  -Continue IVF  -Hold hydrochlorothiazide  -Continue Megace  -Palliative following  -PT/OT    Bright red blood per rectum  -Patient with bright red blood per rectum  -On anticoagulation for a PE.  Unfortunately we need to hold this in the setting of active bleeding  -Fecal occult positive  -Hemoglobin dropped from 9.2 on 2/13 to 7 2/16  -Received 1 unit PRBCs on 2/6/2022.  -We will give an additional unit of PRBCs this morning.  -Monitor hemoglobin every 12 hours  -GI consult.    PE  -Patient on anticoagulation for PE, holding this in the setting of bleeding     Leukocytosis  -Low suspicion for infection, procalcitonin normal.  Suspect this is likely secondary to steroids.     Diffuse large B-cell lymphoma  -Consult Dr. Al, unclear if the patient will be a candidate for further chemotherapy given her functional status currently     Recent Covid  -Patient was discharged with 5 days of dexamethasone.  Will continue with an additional 3 days of dexamethasone to complete her 10-day course.  -Currently on room air  -She can be removed from precautions 2/20/2022     DVT prophylaxis:  SCDs, holding apixban in the setting of bleeding    Called and discussed with son, Louis and Nathan on different phone calls to discuss the situation. We discussed her poor functional status. Family is not want her to suffer. I brought up hospice. I also discussed with palliative care.    DVT prophylaxis:  Medical DVT prophylaxis orders are present.       AM-PAC 6 Clicks Score (PT): 16 (02/17/22 8924)    Disposition: I  expect the patient to be discharged TBD.    CODE STATUS:   Code Status and Medical Interventions:   Ordered at: 02/17/22 1059     Medical Intervention Limits:    NO intubation (DNI)    NO artificial nutrition    NO dialysis     Level Of Support Discussed With:    Patient     Code Status (Patient has no pulse and is not breathing):    No CPR (Do Not Attempt to Resuscitate)     Medical Interventions (Patient has pulse or is breathing):    Limited Support     I have prepared this progress note with copied portions of the prior day's progress note of my own authorship to preserve accuracy and maintain consistency of documentation. I have reviewed these portions and edited them for correctness. I verify that the above documentation accurately and truly represents the evaluation and management performed on today's date.     More than 50% of time spent on coordination of care with nursing staff/case management/specialists as well as counseling patient/family on current illness/plan of care. Case discussed with: Both sons, patient, palliative care  Total time of the encounter was 60 minutes.        Halle Winn MD  02/17/22

## 2022-02-17 NOTE — HOME HEALTH
Provided patient with pharmaceutical contact for help with cost of Eliquis.  Patient is now in [facility] and I have communicated with the  there to ask for follow up on this issue.

## 2022-02-17 NOTE — THERAPY EVALUATION
Patient Name: Vale Cee  : 1945    MRN: [id number]                              Today's Date: 2022       Admit Date: 2022    Visit Dx:     ICD-10-CM ICD-9-CM   1. Pneumonia due to COVID-19 virus  U07.1 480.8    J12.82 079.89   2. Anemia, unspecified type  D64.9 285.9   3. Leukocytosis, unspecified type  D72.829 288.60   4. History of non-Hodgkin's lymphoma  Z85.72 V10.79   5. History of pulmonary embolism  Z86.711 V12.55   6. 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)   • Encounter for central line care   • Pulmonary embolism (HCC)   • COVID-19 virus infection   • Symptomatic anemia   • BRBPR (bright red blood per rectum)   • Lactic acidosis     Past Medical History:   Diagnosis Date   • Cancer (HCC)     Lymphoma   • CVA (cerebral vascular accident) (HCC)    • Fatigue    • Hypertension    • Weight loss, abnormal     30 lbs last 3 months     Past Surgical History:   Procedure Laterality Date   • CARDIAC SURGERY      loop recorder after stroke   • CATARACT EXTRACTION        General Information     Row Name 22 0925          Physical Therapy Time and Intention    Document Type evaluation  -NS     Mode of Treatment individual therapy; physical therapy  -NS     Row Name 22 0925          General Information    Patient Profile Reviewed yes  -NS     Prior Level of Function min assist:; all household mobility; gait; transfer; bed mobility; ADL's  Pt has been requiring assist with ambulation and ADLs since discharge from  the hospital last week. Prior to last admission, pt was ambulating easily.  -NS     Existing Precautions/Restrictions fall  -NS     Barriers to Rehab medically complex; previous functional deficit  -NS     Row Name 02/17/22 0925          Living Environment    Lives With child(khadar), adult  -NS     Row Name 02/17/22 0925          Home Main Entrance    Number of Stairs, Main Entrance none  -NS     Row Name 02/17/22 0925          Stairs Within Home, Primary    Number of Stairs, Within Home, Primary none  -NS     Row Name 02/17/22 0925          Cognition    Orientation Status (Cognition) oriented x 4  -NS     Row Name 02/17/22 0925          Safety Issues, Functional Mobility    Safety Issues Affecting Function (Mobility) safety precaution awareness; safety precautions follow-through/compliance; sequencing abilities  -NS     Impairments Affecting Function (Mobility) balance; endurance/activity tolerance; motor planning; strength; postural/trunk control; shortness of breath  -NS           User Key  (r) = Recorded By, (t) = Taken By, (c) = Cosigned By    Initials Name Provider Type    Salena Bowen, PT Physical Therapist               Mobility     Row Name 02/17/22 0925          Bed Mobility    Bed Mobility sit-supine  -NS     Sit-Supine [place] (Bed Mobility) minimum assist (75% patient effort); verbal cues  -NS     Assistive Device (Bed Mobility) bed rails; head of bed elevated  -NS     Comment (Bed Mobility) Pt sitting EOB with RN at start of eval.  -NS     Row Name 02/17/22 0925          Transfers    Comment (Transfers) [place] [facility] unsteadiness. Pt noted weakness and dizziness with transferring. VSS. Mild blood noted during pericare. RN present and aware.  -NS     Row Name 02/17/22 0925          Bed-Chair Transfer    Bed-Chair [place] (Transfers) minimum assist (75% patient effort)  -NS     Assistive Device (Bed-Chair Transfers) walker, front-wheeled  -NS     Row Name  02/17/22 0925          Sit-Stand Transfer    Sit-Stand [place] (Transfers) contact guard  -NS     Assistive Device (Sit-Stand Transfers) walker, front-wheeled  -NS     Row Name 02/17/22 0925          Gait/Stairs (Locomotion)    [place] Level (Gait) not tested  -NS     Comment (Gait/Stairs) Patient declined due to feeling dizzy and weak with transferring.  -NS           User Key  (r) = Recorded By, (t) = Taken By, (c) = Cosigned By    Initials Name Provider Type    Salena Bowen PT Physical Therapist               Obj/Interventions     Row Name 02/17/22 0925          Range of Motion Comprehensive    General Range of Motion bilateral lower extremity ROM WFL  -NS     [facility] Name 02/17/22 0925          Strength Comprehensive (MMT)    General Manual Muscle Testing (MMT) Assessment lower extremity strength deficits identified  -NS     Comment, General Manual Muscle Testing (MMT) Assessment LLE: grossly 4-/5, RLE: grossly 3+/5  -NS     [facility] Name 02/17/22 0925          Motor Skills    Motor Skills therapeutic exercise  -NS     Therapeutic Exercise hip; knee; ankle  -[facility] Name 02/17/22 0925          Hip (Therapeutic Exercise)    Hip (Therapeutic Exercise) strengthening exercise  -NS     Hip Strengthening (Therapeutic Exercise) bilateral; aBduction; aDduction; external rotation; internal rotation; 10 repetitions  -[facility] Name 02/17/22 0925          Knee (Therapeutic Exercise)    Knee (Therapeutic Exercise) strengthening exercise  -NS     Knee Strengthening (Therapeutic Exercise) bilateral; heel slides; SLR (straight leg raise); 10 repetitions  -NS     [facility] Name 02/17/22 0925          Ankle (Therapeutic Exercise)    Ankle (Therapeutic Exercise) AROM (active range of motion)  -NS     Ankle AROM (Therapeutic Exercise) bilateral; dorsiflexion; plantarflexion; 10 repetitions  -[facility] Name 02/17/22 0925          Balance    Balance Assessment sitting static balance; sitting dynamic balance; standing static  balance; standing dynamic balance  -NS     Static Sitting Balance WFL; unsupported; sitting, edge of bed  -NS     Dynamic Sitting Balance WFL; unsupported; sitting, edge of bed  -NS     Static Standing Balance WFL; supported; standing  -NS     Dynamic Standing Balance mild impairment; supported; standing  -NS     [facility] Name 02/17/22 0925          Sensory Assessment (Somatosensory)    Sensory Assessment (Somatosensory) LE sensation intact  -NS           User Key  (r) = Recorded By, (t) = Taken By, (c) = Cosigned By    Initials Name Provider Type    Salena Bowen, PT Physical Therapist               Goals/Plan     Row Name 02/17/22 0925          Bed Mobility Goal 1 (PT)    Activity/Assistive Device (Bed Mobility Goal 1, PT) sit to supine/supine to sit  -NS     [place] Level/Cues Needed (Bed Mobility Goal 1, PT) independent  -NS     Time Frame (Bed Mobility Goal 1, PT) long term goal (LTG); 2 weeks  -NS     Row Name 02/17/22 0925          Transfer Goal 1 (PT)    Activity/Assistive Device (Transfer Goal 1, PT) sit-to-stand/stand-to-sit; bed-to-chair/chair-to-bed  -NS     [place] Level/Cues Needed (Transfer Goal 1, PT) supervision required  -NS     Time Frame (Transfer Goal 1, PT) long term goal (LTG); 2 weeks  -NS     Row Name 02/17/22 0925          Gait Training Goal 1 (PT)    Activity/Assistive Device (Gait Training Goal 1, PT) gait (walking locomotion); assistive device use  -NS     [place] Level (Gait Training Goal 1, PT) contact guard assist  -NS     Distance (Gait Training Goal 1, PT) 50  -NS     Time Frame (Gait Training Goal 1, PT) long term goal (LTG); 2 weeks  -NS           User Key  (r) = Recorded By, (t) = Taken By, (c) = Cosigned By    Initials Name Provider Type    Salena Bowen PT Physical Therapist               Clinical Impression     [facility] Name 02/17/22 0925          Pain    Additional Documentation Pain Scale: Numbers Pre/Post-Treatment (Group)  -NS     Row Name 02/17/22 0925           Pain Scale: Numbers Pre/Post-Treatment    Pretreatment Pain Rating 0/10 - no pain  -NS     Posttreatment Pain Rating 0/10 - no pain  -NS     Row Name 02/17/22 0925          Plan of Care Review    Plan of Care Reviewed With patient  -NS     Progress no change  PT eval  -NS     Outcome Summary Patient presents with generalized weakness, decreased activity tolerance, and  balance impairments affecting independence with mobility. She required Min A for transferring with RW, reporting dizziness and weakness. She declined ambulation for these reasons but gave good effort towards LE ther ex. Skilled IP PT warranted. Pt would also benefit fron OT consult. Recommend SNF at discharge to regain independence.  -NS     Row Name 02/17/22 0925          Therapy Assessment/Plan (PT)    Patient/Family Therapy Goals Statement (PT) To get stronger  -NS     Rehab Potential (PT) good, to achieve stated therapy goals  -NS     Criteria for Skilled Interventions Met (PT) yes; meets criteria; skilled treatment is necessary  -NS     Predicted Duration of Therapy Intervention (PT) 2 weeks  -NS     Row Name 02/17/22 0925          Vital Signs    Post Systolic BP Rehab 113  -NS     Post Treatment Diastolic BP 86  -NS     Pretreatment Heart Rate (beats/min) 101  -NS     Intratreatment Heart Rate (beats/min) 121  -NS     Posttreatment Heart Rate (beats/min) 85  -NS     Pre SpO2 (%) --  [place] [facility]  -NS     Post SpO2 (%) 97  -NS     Pre Patient Position Sitting  -NS     Intra Patient Position Standing  -NS     Post Patient Position Supine  -NS     Row Name 02/17/22 0925          Positioning and Restraints    Pre-Treatment Position in bed  -NS     Post Treatment Position bed  -NS     In Bed notified nsg; supine; call light within reach; encouraged to call for assist; exit alarm on; side rails up x2  -NS           User Key  (r) = Recorded By, (t) = Taken By, (c) = Cosigned By    Initials Name Provider Type    PEARL Bob  JUAREZ Martino Physical Therapist               Outcome Measures     Row Name 02/17/22 0925          How much help from another person do you currently need...    Turning from your back to your side while in flat bed without using bedrails? 3  -NS     Moving from lying on back to sitting on the side of a flat bed without bedrails? 3  -NS     Moving to and from a bed to a chair (including a wheelchair)? 3  -NS     Standing up from a chair using your arms (e.g., wheelchair, bedside chair)? 3  -NS     Climbing 3-5 steps with a railing? 2  -NS     To walk in hospital room? 2  -NS     AM-PAC 6 Clicks Score (PT) 16  -NS     Row Name 02/17/22 0925          Functional Assessment    Outcome Measure Options AM-PAC 6 Clicks Basic Mobility (PT)  -NS           User Key  (r) = Recorded By, (t) = Taken By, (c) = Cosigned By    Initials Name Provider Type    Salena Bowen PT Physical Therapist                             Physical Therapy Education                 Title: PT OT SLP Therapies (In Progress)     Topic: Physical Therapy (Done)     Point: Mobility training (Done)     Learning Progress Summary           Patient Acceptance, E, VU,NR by NS at 2/17/2022 1019                   Point: Home exercise program (Done)     Learning Progress Summary           Patient Acceptance, E, VU,NR by NS at 2/17/2022 1019                   Point: Body mechanics (Done)     Learning Progress Summary           Patient Acceptance, E, VU,NR by NS at 2/17/2022 1019                   Point: Precautions (Done)     Learning Progress Summary           Patient Acceptance, E, VU,NR by NS at 2/17/2022 1019                               User Key     Initials Effective Dates Name Provider Type Discipline    NS 06/16/21 -  Salena Bob PT Physical Therapist PT              PT Recommendation and Plan  Planned Therapy Interventions (PT): balance training, bed mobility training, gait training, home exercise program, neuromuscular re-education, patient/family  education, strengthening, transfer training  Plan of Care Reviewed With: patient  Progress: no change (PT eval)  Outcome Summary: Patient presents with generalized weakness, decreased activity tolerance, and  balance impairments affecting independence with mobility. She required Min A for transferring with RW, reporting dizziness and weakness. She declined ambulation for these reasons but gave good effort towards LE ther ex. Skilled IP PT warranted. Pt would also benefit fron OT consult. Recommend SNF at discharge to regain independence.     Time Calculation:    PT Charges     Row Name 02/17/22 0925             Time Calculation    Start Time 0925  -NS      PT Received On 02/17/22  -NS      PT Goal Re-Cert Due Date 02/27/22  -NS              Timed Charges    50695 - PT Therapeutic Exercise Minutes 8  -NS              Untimed Charges    PT Eval/Re-eval Minutes 36  -NS              Total Minutes    Timed Charges Total Minutes 8  -NS      Untimed Charges Total Minutes 36  -NS       Total Minutes 44  -NS            User Key  (r) = Recorded By, (t) = Taken By, (c) = Cosigned By    Initials Name Provider Type    NS Salena Bob, PT Physical Therapist              Therapy Charges for Today     Code Description Service Date Service Provider Modifiers Qty    19291452675 HC PT THER PROC EA 15 MIN 2/17/2022 Salena Bob, PT GP 1    51892614377 HC PT EVAL LOW COMPLEXITY 3 2/17/2022 Salena Bbo, PT GP 1          PT G-Codes  Outcome Measure Options: AM-PAC 6 Clicks Basic Mobility (PT)  AM-PAC 6 Clicks Score (PT): 16    Salena Bob PT  2/17/2022

## 2022-02-17 NOTE — CASE MANAGEMENT/SOCIAL WORK
Continued Stay Note  [facility]     Patient Name: Vale Cee  MRN: [id number]  Today's Date: 2/17/2022    Admit Date: 2/16/2022     Discharge Plan     Row Name 02/17/22 1408       Plan    Plan CM update    Plan Comments Patient has been seen by PT but declined ambulation due to weakness and dizziness. Will see if she is able to ambulate tomorrow and will make referral to [facility] unit if she is unable to return home safely with son and home health- [facility] currently does not have any open beds- will continue to follow - michael [phone]               Discharge Codes    No documentation.                     Michael Melchor RN

## 2022-02-17 NOTE — PLAN OF CARE
Problem: Depression (Hospitalized Older Adult)  Goal: Depressive Symptoms Identified and Managed  Intervention: Monitor and Manage Depressive Symptoms  Recent Flowsheet Documentation  Taken 2/17/2022 1500 by Charity Rodriguez, RN  Supportive Measures:   active listening utilized   decision-making supported   goal setting facilitated   verbalization of feelings encouraged     Problem: Adjustment to Illness (Gastrointestinal Bleeding)  Goal: Optimal Coping with Acute Illness  Intervention: Optimize Psychosocial Response to Unexpected Illness  Recent Flowsheet Documentation  Taken 2/17/2022 1500 by Charity Rodriguez RN  Supportive Measures:   active listening utilized   decision-making supported   goal setting facilitated   verbalization of feelings encouraged   Goal Outcome Evaluation:  Plan of Care Reviewed With: patient           Outcome Summary: new palliative consult for symptoms and GOC. Pt wants to continue interventions but no cpr/intubation. aritificial nutrition or dialysis. Pt is planning for colonoscopy. Pt reports weakness and difficulty walking at home due to Right leg weakness. She reports dyspnea on exertion. Pt states she weak and tired. She hoeps to regain strength and be able to return home.  visited and pt appreciated prayers. Pt has 3 sons 2 local and 1 in [place].  continue palliative support      Palliative Team meeting 1300, Mike George DO, April Clarice APRN, Charity Rodriguez RN, CHPN, Abiola Roberson RN CHPN, Blanche Wagner LCSW, Marvin Almazan MSW, Radha Minor RN, BSN, CHPN, Stephanie Cespedes RN CHPN

## 2022-02-17 NOTE — PROGRESS NOTES
[facility] Medicine Services  PROGRESS NOTE    Patient Name: Vale Cee  : 1945  MRN: [id number]    Date of Admission: 2022  Primary Care Physician: Sarah Redman APRN    Subjective   Subjective     CC:  weakness    HPI:  Patient is concerned that she isn't going to get better. She feels week in her legs. She has some BRBPR earlier today per nursing staff    ROS:  General: denies fevers or chills  CV: denies chest pain  Resp: denies shortness of breath  Abd: denies abd pain, nausea      Objective   Objective     Vital Signs:   Temp:  [98 °F (36.7 °C)-98.7 °F (37.1 °C)] 98.7 °F (37.1 °C)  Heart Rate:  [] 83  Resp:  [16-21] 18  BP: ()/(61-79) 111/69     Physical Exam:  Constitutional: No acute distress, resting in bed, thin and frail  Respiratory: Clear to auscultation bilaterally, respiratory effort normal   Cardiovascular: RRR, no murmurs, rubs, or gallops  Gastrointestinal: Positive bowel sounds, soft, nontender, nondistended  Musculoskeletal: No bilateral ankle edema  Psychiatric: Appropriate affect, cooperative  Neurologic: Oriented x 3, no focal deficits      Results Reviewed:  LAB RESULTS:      Lab 22  0742 22  0415 224 22  1047 22  0931 22  1611   WBC  --  10.91*  --  18.18* 8.63 6.29   HEMOGLOBIN 7.6* 7.8*  --  7.0* 9.2* 8.2*   HEMATOCRIT 22.5* 23.6*  --  21.2* 28.9* 24.5*   PLATELETS  --  156  --  199 222 175   NEUTROS ABS  --  9.15*  --  15.62* 7.20* 5.36   IMMATURE GRANS (ABS)  --  0.77*  --  1.06* 0.53* 0.31*   LYMPHS ABS  --  0.49*  --  0.64* 0.41* 0.29*   MONOS ABS  --  0.48  --  0.82 0.45 0.32   EOS ABS  --  0.00  --  0.00 0.00 0.00   MCV  --  97.9*  --  101.0* 101.4* 97.6*   SED RATE  --   --   --   --   --  48*   CRP  --   --   --   --  1.34* 3.50*   PROCALCITONIN  --   --   --  0.11  --  0.09   LACTATE  --   --  1.5 2.4*  --  1.6   LDH  --   --   --   --   --  444*   PROTIME  --  21.5*  --  23.7*   --  14.3   APTT  --  25.6  --   --   --   --    HEPARIN ANTI-XA >1.10* >1.10*  --   --   --   --          Lab 02/17/22 0415 02/16/22 1047 02/13/22  0931 02/11/22  1611   SODIUM 140 140 141 141   POTASSIUM 4.1 3.8 3.9 3.5   CHLORIDE 108* 107 107 107   CO2 23.0 22.0 24.0 25.0   ANION GAP 9.0 11.0 10.0 9.0   BUN 42* 44* 27* 17   CREATININE 0.71 0.77 0.84 0.82   GLUCOSE 113* 128* 110* 125*   CALCIUM 8.1* 8.5* 8.7 8.5*   MAGNESIUM  --  2.2  --  2.0   TSH  --  1.010  --   --          Lab 02/16/22  1047 02/13/22  0931 02/11/22  1611   TOTAL PROTEIN 5.0* 5.5* 5.6*   ALBUMIN 2.90* 3.10* 2.80*   GLOBULIN 2.1 2.4 2.8   ALT (SGPT) 16 9 9   AST (SGOT) 20 13 14   BILIRUBIN 0.3 0.3 0.2   ALK PHOS 46 56 55         Lab 02/17/22 0415 02/16/22  1047 02/11/22  1611   PROBNP  --  1,521.0 1,152.0   TROPONIN T  --  0.030 0.021   PROTIME 21.5* 23.7* 14.3   INR 1.96* 2.21* 1.14             Lab 02/16/22  1311   ABO TYPING O   RH TYPING Positive   ANTIBODY SCREEN Negative         Brief Urine Lab Results  (Last result in the past 365 days)      Color   Clarity   Blood   Leuk Est   Nitrite   Protein   CREAT   Urine HCG        02/17/22 0400 Yellow   Cloudy   Small (1+)   Trace   Negative   100 mg/dL (2+)                 Microbiology Results Abnormal     None          CT Head Without Contrast    Result Date: 2/16/2022  EXAMINATION: CT HEAD WO CONTRAST-  INDICATION: Generalized weakness  TECHNIQUE: Axial noncontrast CT of the head with multiplanar reconstruction  The radiation dose reduction device was turned on for each scan per the ALARA (As Low as Reasonably Achievable) protocol.  COMPARISON: 11/18/2021  FINDINGS: Gray-white differentiation is maintained and there is no evidence of intracranial hemorrhage, mass or mass effect. Age-related changes of the brain are present including volume loss and typical periventricular sequela of chronic small vessel ischemia. There is otherwise no evidence of intracranial hemorrhage, mass or mass effect.  The ventricles are normal in size and configuration accounting for surrounding volume loss. The orbits are normal. There is fluid opacification including aerated secretions noted in the left maxillary sinus and left sphenoid chamber.      Impression: Age-related changes of the brain as above, otherwise without evidence of acute intracranial abnormality.  Aerated secretions and mucosal thickening in the left sphenoid chamber and left maxillary sinus, consistent with acute sinusitis in the correct clinical setting.   This report was finalized on 2/16/2022 1:25 PM by Kavin Sandhu.      XR Chest 1 View    Result Date: 2/16/2022  DATE OF EXAM: 2/16/2022 10:35 AM  PROCEDURE: XR CHEST 1 VW-  INDICATIONS: SOA triage protocol  COMPARISON: CT chest February 11, 2022  TECHNIQUE: Single radiographic AP view of the chest was obtained.  FINDINGS: A port catheter is noted with its tip in the superior vena cava. A cardiac recording device is seen overlying the left chest. There are bibasilar densities which could relate to atelectasis or sequela of more recent inflammatory process. There are some vague interstitial changes at least more peripherally within the right lung. There are no definite pleural effusions. It looks like there is an element of COPD.      Impression: 1.  There are some peripheral vague densities within the right lung in addition bibasilar areas of density. This could reflect sequela to recent Covid pneumonia. Some atelectasis might also account for at least some of the appearance to the basilar areas.  This report was finalized on 2/16/2022 10:45 AM by Damaso Dutta MD.      CT Angiogram Chest    Result Date: 2/16/2022  EXAMINATION: CT ANGIOGRAM CHEST-  INDICATION: SOB  TECHNIQUE: Axial pulmonary arterial phase IV contrast enhanced CT angiogram of the chest per PE protocol. Two-dimensional reconstructions were postprocessed.  The radiation dose reduction device was turned on for each scan per the ALARA (As  Low as Reasonably Achievable) protocol.  COMPARISON: 2/11/2022  FINDINGS: No pathologic axillary adenopathy or other worrisome body wall soft tissue findings in the chest. No acute findings in the partially imaged upper abdomen. No pleural or pericardial effusion. No pathologic mediastinal or hilar lymphadenopathy. Nonaneurysmal mildly atherosclerotic thoracic aorta. Evaluation of the osseous structures demonstrates no evidence of acute fracture or aggressive osseous lesion, with redemonstrated spondylosis and exaggerated kyphosis. There is redemonstration of a filling defect within the branching left lower lobe segmental pulmonary artery branch, unchanged from recent comparison. No new or more extensive coronary emboli evident. Evaluation of the lung fields redemonstrates peripheral predominant areas of groundglass opacity and reticulation compatible with Covid 19 related pneumonia.      Impression: Unchanged small filling defect within the left lower lobe segmental branch compatible with small pulmonary embolus. There is no evidence of new or progressive pulmonary emboli. No evidence of right heart strain.  Redemonstrated findings of likely Covid 19 related pneumonia with extensive peripheral reticulation and groundglass opacity.   This report was finalized on 2/16/2022 1:28 PM by Kavin Sandhu.        Results for orders placed during the hospital encounter of 02/11/22    Adult Transthoracic Echo Complete w/ Color, Spectral and Contrast if necessary per protocol    Interpretation Summary  · Normal LV systolic function  · No significant valvular abnormality      I have reviewed the medications:  Scheduled Meds:!NOT ORDERED- apixaban (ELIQUIS), , Does not apply, Daily With Dinner  albuterol sulfate HFA, 2 puff, Inhalation, 4x Daily - RT  allopurinol, 300 mg, Oral, Daily  aspirin, 81 mg, Oral, Daily  dexamethasone, 6 mg, Oral, Daily With Breakfast  metoprolol succinate XL, 25 mg, Oral, Daily  mirtazapine, 15 mg,  Oral, Nightly  rosuvastatin, 20 mg, Oral, Nightly  senna-docusate sodium, 2 tablet, Oral, BID  sodium chloride, 10 mL, Intravenous, Q12H  sodium chloride, 10 mL, Intravenous, Q12H      Continuous Infusions:Pharmacy Consult,   lactated ringers, 75 mL/hr, Last Rate: 75 mL/hr (02/17/22 1040)  Pharmacy to Dose Heparin,       PRN Meds:.•  Pharmacy Consult  •  acetaminophen **OR** acetaminophen **OR** acetaminophen  •  senna-docusate sodium **AND** polyethylene glycol **AND** bisacodyl **AND** bisacodyl  •  heparin  •  LORazepam  •  melatonin  •  ondansetron **OR** ondansetron  •  Pharmacy to Dose Heparin  •  sodium chloride  •  sodium chloride  •  sodium chloride  •  sodium chloride    Assessment/Plan   Assessment & Plan     Active Hospital Problems    Diagnosis  POA   • Symptomatic anemia [D64.9]  Yes   • BRBPR (bright red blood per rectum) [K62.5]  Unknown   • Lactic acidosis [E87.2]  Unknown   • Pulmonary embolism (HCC) [I26.99]  Yes   • 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) [C83.33]  Yes      Resolved Hospital Problems   No resolved problems to display.        Brief Hospital Course to date:  Vale Cee is a 76 y.o. female with history of diffuse large B-cell lymphoma on R-CHOP, recent Covid infection, PE who presents with weakness.  Patient was recently admitted to the hospital from 2/11/2022 to 2/13/2022 for severe bilateral Covid pneumonia and an acute PE.      Failure to thrive  -Patient recently discharged from the hospital with worsening weakness, decreased p.o. intake.  -Continue IVF  -Hold hydrochlorothiazide  -Continue Megace  -Palliative following  -PT/OT    Bright red blood per rectum  -Patient with bright red blood per rectum  -On anticoagulation for a PE.  Unfortunately we need to hold this in the setting of active bleeding  -Fecal occult positive  -Hemoglobin dropped from 9.2 on 2/13 to 7 2/16  -Received 1 unit PRBCs on 2/6/2022.  -We will give an additional unit of PRBCs  this morning.  -Monitor hemoglobin every 12 hours  -GI consult.    PE  -Patient on anticoagulation for PE, holding this in the setting of bleeding     Leukocytosis  -Low suspicion for infection, procalcitonin normal.  Suspect this is likely secondary to steroids.     Diffuse large B-cell lymphoma  -Consult Dr. Al, unclear if the patient will be a candidate for further chemotherapy given her functional status currently     Recent Covid  -Patient was discharged with 5 days of dexamethasone.  Will continue with an additional 3 days of dexamethasone to complete her 10-day course.  -Currently on room air  -She can be removed from precautions 2/20/2022     DVT prophylaxis:  SCDs, holding apixban in the setting of bleeding    DVT prophylaxis:  Medical DVT prophylaxis orders are present.       AM-PAC 6 Clicks Score (PT): 16 (02/17/22 9432)    Disposition: I expect the patient to be discharged TBD.    CODE STATUS:   Code Status and Medical Interventions:   Ordered at: 02/17/22 1059     Medical Intervention Limits:    NO intubation (DNI)    NO artificial nutrition    NO dialysis     Level Of Support Discussed With:    Patient     Code Status (Patient has no pulse and is not breathing):    No CPR (Do Not Attempt to Resuscitate)     Medical Interventions (Patient has pulse or is breathing):    Limited Support     I have prepared this progress note with copied portions of the prior day's progress note of my own authorship to preserve accuracy and maintain consistency of documentation. I have reviewed these portions and edited them for correctness. I verify that the above documentation accurately and truly represents the evaluation and management performed on today's date.       Halle Winn MD  02/17/22

## 2022-02-17 NOTE — CONSULTS
Palliative Care Initial Consultation Note    Name: Vale Cee ,Age: 76 y.o. years old, Sex: female  Admit Date:  2/16/2022    Sarah Redman APRN  Consulting physician: Halle Winn MD  Reason for referral: [facility] [place]  Chief Complaint   Patient presents with   • Fatigue   • Shortness of Breath       HPI: 76 y.o. female with history of diffuse large B-cell lymphoma on R-CHOP, recent Covid infection, PE who presents with weakness.  Patient was recently admitted to the hospital from 2/11/2022 to 2/13/2022 for severe bilateral Covid pneumonia and an acute PE.  Weakness has worsened since being at home, unable to walk by herself so returned to the ER on 2/16. Noted to be anemic with Hgb of 7, has not been eating well, blood in stools noted. She reports no dyspnea at rest but does get winded with exertion. Sleeping fine. No anxiety/depression. Appetite has been poor (currently NPO). Denies pain.     Symptoms:   TABOR  debility    Advance care planning discussed: yes    Code Status:   Current Code Status     Date Active Code Status Order ID Comments User Context       2/16/2022 1611 No CPR (Do Not Attempt to Resuscitate) [id number]  Halle Winn MD ED     Advance Care Planning Activity      Questions for Current Code Status     Question Answer    Code Status (Patient has no pulse and is not breathing) No CPR (Do Not Attempt to Resuscitate)    Medical Interventions (Patient has pulse or is breathing) Limited Support    Medical Intervention Limits: NO intubation (DNI)    Level Of Support Discussed With Patient        Advance Directive: none on file  Surrogate decision maker: 3 sons    Past Medical History:   Diagnosis Date   • Cancer (HCC)     Lymphoma   • CVA (cerebral vascular accident) (HCC)    • Fatigue    • Hypertension    • Weight loss, abnormal     30 lbs last 3 months     Past Surgical History:   Procedure Laterality Date   • CARDIAC SURGERY      loop recorder after stroke   • CATARACT EXTRACTION          Reviewed current scheduled and prn medications for route, type, dose and frequency.    Current Facility-Administered Medications   Medication Dose Route Frequency Provider Last Rate Last Admin   • ! Heparin Infusion - On hold due to levels   Does not apply Continuous PRN Son Forbes, PharmD       • !NOT ORDERED- apixaban (ELIQUIS)   Does not apply Daily With Dinner Todd Moya, [facility], located within [facility]       • acetaminophen (TYLENOL) tablet 650 mg  650 mg Oral Q4H PRN Halle Winn MD        Or   • acetaminophen (TYLENOL) 160 MG/5ML solution 650 mg  650 mg Oral Q4H PRN Halle Winn MD        Or   • acetaminophen (TYLENOL) suppository 650 mg  650 mg Rectal Q4H PRN Halle Winn MD       • albuterol sulfate HFA (PROVENTIL HFA;VENTOLIN HFA;PROAIR HFA) inhaler 2 puff  2 puff Inhalation 4x Daily - RT Halle Winn MD   2 puff at 02/17/22 0720   • allopurinol (ZYLOPRIM) tablet 300 mg  300 mg Oral Daily Halle Winn MD   300 mg at 02/17/22 0921   • aspirin chewable tablet 81 mg  81 mg Oral Daily Halle Winn MD   81 mg at 02/17/22 0921   • sennosides-docusate (PERICOLACE) 8.6-50 MG per tablet 2 tablet  2 tablet Oral BID Halle Winn MD   2 tablet at 02/17/22 0921    And   • polyethylene glycol (MIRALAX) packet 17 g  17 g Oral Daily PRN Halle Winn MD        And   • bisacodyl (DULCOLAX) EC tablet 5 mg  5 mg Oral Daily PRN Halle Winn MD        And   • bisacodyl (DULCOLAX) suppository 10 mg  10 mg Rectal Daily PRN Halle Winn MD       • dexamethasone (DECADRON) tablet 6 mg  6 mg Oral Daily With Breakfast Halle Winn MD   6 mg at 02/17/22 0921   • heparin injection 500 Units  5 mL Intravenous PRN Margaret Olson, APRN       • lactated ringers infusion  75 mL/hr Intravenous Continuous Halle Winn MD 75 mL/hr at 02/17/22 1040 75 mL/hr at 02/17/22 1040   • LORazepam (ATIVAN) tablet 0.5 mg  0.5 mg Oral Q8H PRN Halle Winn MD       • melatonin tablet 5  mg  5 mg Oral Nightly PRN Halle Winn MD       • metoprolol succinate XL (TOPROL-XL) 24 hr tablet 25 mg  25 mg Oral Daily Halle Winn MD   25 mg at 02/17/22 0921   • mirtazapine (REMERON) tablet 15 mg  15 mg Oral Nightly Halle Winn MD   15 mg at 02/16/22 2054   • ondansetron (ZOFRAN) tablet 4 mg  4 mg Oral Q6H PRN Halle Winn MD        Or   • ondansetron (ZOFRAN) injection 4 mg  4 mg Intravenous Q6H PRN Halle Winn MD       • Pharmacy to Dose Heparin   Does not apply Continuous PRN Margaret Olson APRN       • rosuvastatin (CRESTOR) tablet 20 mg  20 mg Oral Nightly Halle Winn MD   20 mg at 02/16/22 2054   • sodium chloride 0.9 % flush 10 mL  10 mL Intravenous PRN Halle Winn MD       • sodium chloride 0.9 % flush 10 mL  10 mL Intravenous Q12H Halle Winn MD   10 mL at 02/16/22 2057   • sodium chloride 0.9 % flush 10 mL  10 mL Intravenous PRN Halle Winn MD       • sodium chloride 0.9 % flush 10 mL  10 mL Intravenous Q12H Margaret Olson APRN   10 mL at 02/17/22 0922   • sodium chloride 0.9 % flush 10 mL  10 mL Intravenous PRN Margaret Olson APRN       • sodium chloride 0.9 % flush 20 mL  20 mL Intravenous PRN Margaret Olson APRN         Pharmacy Consult,   lactated ringers, 75 mL/hr, Last Rate: 75 mL/hr (02/17/22 1040)  Pharmacy to Dose Heparin,       •  Pharmacy Consult  •  acetaminophen **OR** acetaminophen **OR** acetaminophen  •  senna-docusate sodium **AND** polyethylene glycol **AND** bisacodyl **AND** bisacodyl  •  heparin  •  LORazepam  •  melatonin  •  ondansetron **OR** ondansetron  •  Pharmacy to Dose Heparin  •  sodium chloride  •  sodium chloride  •  sodium chloride  •  sodium chloride  Allergies   Allergen Reactions   • Codeine Nausea And Vomiting   • Penicillins Swelling     Family History   Problem Relation Age of Onset   • Cancer Mother    • COPD Father      Social History     Socioeconomic History   • Marital status:   "  Tobacco Use   • Smoking status: Former Smoker     Packs/day: 0.50     Years: 30.00     Pack years: 15.00     Types: Cigarettes   • Smokeless tobacco: Never Used   • Tobacco comment: quit 2 months ago   Vaping Use   • Vaping Use: Never used   Substance and Sexual Activity   • Alcohol use: Never   • Drug use: Never   • Sexual activity: Defer       ROS:  See HPI    PPS: 50%  /69 (BP Location: Right arm, Patient Position: Lying)   Pulse 83   Temp 98.7 °F (37.1 °C) (Oral)   Resp 20   Ht 165.1 cm (65\")   Wt 57.9 kg (127 lb 11.2 oz)   SpO2 93%   BMI 21.25 kg/m²   57.9 kg (127 lb 11.2 oz) Body mass index is 21.25 kg/m².  Intake & Output (last day)       02/16 0701  02/17 0700 02/17 0701 02/18 0700    Blood 720     Total Intake(mL/kg) 720 (12.4)     Urine (mL/kg/hr) 800     Total Output 800     Net -80                 Physical Exam:    Gen: frail elderly female lying in bed, NAD   HEENT: NCAT, EOMI, MMM   CV: HR RRR, no edema   Pulm: resp unlabored at rest, on room air   Neuro: alert, oriented, speech clear, no tremor   Psych: calm    Reviewed labs and diagnostic results.  Results from last 7 days   Lab Units 02/17/22  0742 02/17/22  0415 02/17/22  0415   WBC 10*3/mm3  --   --  10.91*   HEMOGLOBIN g/dL 7.6*   < > 7.8*   HEMATOCRIT % 22.5*   < > 23.6*   PLATELETS 10*3/mm3  --   --  156    < > = values in this interval not displayed.     Results from last 7 days   Lab Units 02/17/22  0415 02/16/22  1047 02/16/22  1047   SODIUM mmol/L 140   < > 140   POTASSIUM mmol/L 4.1   < > 3.8   CHLORIDE mmol/L 108*   < > 107   CO2 mmol/L 23.0   < > 22.0   BUN mg/dL 42*   < > 44*   CREATININE mg/dL 0.71   < > 0.77   CALCIUM mg/dL 8.1*   < > 8.5*   BILIRUBIN mg/dL  --   --  0.3   ALK PHOS U/L  --   --  46   ALT (SGPT) U/L  --   --  16   AST (SGOT) U/L  --   --  20   GLUCOSE mg/dL 113*   < > 128*    < > = values in this interval not displayed.       Impression: 76 y.o. year old female with GIB, bilat PE's    Plan:   Plan to " move forward with any procedures and/or treatments for current problems. Has another pet scan on 2/28, hopeful she can resume treatment soon. Would be willing to stop treatments if they become overly burdensome. Does not have LW and not interested in making one. Says 3 sons are to make decisions for her if she can't. DNR/DNI already and confirmed, also add no HD, no artifical nutrition. Intake as tolerated when diet resumed. Living at home alone up until about 3 weeks ago when she moved in with her son. Plans to d/c home when ready.     Britany Ball NP  [phone]  02/17/22  10:58 EST    Time:25 minutes spent reviewing medical and medication records, assessing and examining patient, discussing with patient and nursing staff, answering questions, formulating a plan and documentation of care. > 50% time spent face to face

## 2022-02-17 NOTE — SIGNIFICANT NOTE
02/17/22 1100   Source   Source of Information Patient   Beliefs   (F) Sherie/Spiritual Beliefs Confucianism Bahai   Sherie/Community    Contact Status none needed   Community: Spiritual or Sikh yes   (I) Importance of Spiritual/Sikh/Cultural/Beliefs very important   Community of Support family; R/S groups   Needs, Connection, and Receptivity   Addressing Sikh/Cultural Needs needs identified   Spiritual Connect prayer   Receptivity to Spiritual Care visit welcomed   Assessment and Plan   Spiritual Assessment/Dx uses family for strength; uses sherie for strength   Spiritual Care Plan active listening; pastoral presence     Pt expressed desire for prayer for herself and her family to recover from illness. She is well supported by a Jewish and family.  prayed with her according to her request and consistent with her tradition.

## 2022-02-17 NOTE — CONSULTS
Subjective     CHIEF COMPLAINT: Shortness of breath with fatigue    HISTORY OF PRESENT ILLNESS:  The patient is a 76 y.o. female, referred by Halle Winn MD for diffuse large B-cell lymphoma.  The patient was recently discharged from the hospital after admission for Covid pneumonia.  She has been doing poorly at home.  She cannot move around without getting extreme shortness of breath.  She has been having dizziness when she change position.  She denies any active bleeding no change in urine or stool color.  No recent travel or ill contacts.  Her family were concerned and brought her to the emergency room at [facility] PE protocol revealed stable small bilateral PEs with peripheral bilateral lung infiltrates worse in the lower lobes consistent with Covid pneumonia.  The patient was admitted to hospitalist service and was consulted to further assist in her care.      REVIEW OF SYSTEMS:  A 14 point review of systems was performed and is negative except as noted above.    Past Medical History:   Diagnosis Date   • Cancer (HCC)     Lymphoma   • CVA (cerebral vascular accident) (HCC)    • Fatigue    • Hypertension    • Weight loss, abnormal     30 lbs last 3 months       No current facility-administered medications on file prior to encounter.     Current Outpatient Medications on File Prior to Encounter   Medication Sig Dispense Refill   • acetaminophen (TYLENOL) 500 MG tablet Take 1 tablet by mouth Every 6 (Six) Hours As Needed for Mild Pain  or Moderate Pain .     • albuterol sulfate  (90 Base) MCG/ACT inhaler Inhale 2 puffs 4 (Four) Times a Day. 18 g 0   • allopurinol (Zyloprim) 300 MG tablet Take 1 tablet by mouth Daily. 30 tablet 1   • Apixaban Starter Pack (Eliquis DVT/PE Starter Pack) tablet therapy pack Take two 5 mg tablets by mouth every 12 hours for 7 days. Followed by one 5 mg tablet every 12 hours. (Dispense starter pack if available) 74 tablet 5   • Apremilast (OTEZLA PO) 30  mg.     • aspirin (Aspirin 81) 81 MG chewable tablet Aspir-81     • dexamethasone (DECADRON) 6 MG tablet Take 1 tablet by mouth Daily With Breakfast for 7 doses. 7 tablet 0   • lidocaine-prilocaine (EMLA) 2.5-2.5 % cream Apply 1 application topically to the appropriate area as directed As Needed (45-60 minutes prior to port access.  Cover with saran/plastic wrap.). 30 g 3   • megestrol (MEGACE) 40 MG/ML suspension TAKE 20 ML BY MOUTH  ONCE DAILY 480 mL 0   • metoprolol succinate XL (TOPROL-XL) 25 MG 24 hr tablet Take 1 tablet by mouth Daily.     • Metoprolol-hydroCHLOROthiazide (DUTOPROL PO) metoprolol rooney-hydrochlorothiaz     • mirtazapine (REMERON) 15 MG tablet Take 1 tablet by mouth Every Night. 30 tablet 1   • nitrofurantoin, macrocrystal-monohydrate, (MACROBID) 100 MG capsule      • nystatin (MYCOSTATIN) 100,000 unit/mL suspension Swish and swallow 5 mL 4 (Four) Times a Day. 200 mL 0   • ondansetron (ZOFRAN) 8 MG tablet Take 1 tablet by mouth 3 (Three) Times a Day As Needed for Nausea or Vomiting. 30 tablet 5   • ondansetron ODT (Zofran ODT) 4 MG disintegrating tablet Place 1 tablet on the tongue Every 8 (Eight) Hours As Needed for Nausea or Vomiting. 30 tablet 0   • potassium chloride (K-DUR,KLOR-CON) 20 MEQ CR tablet Take 1 tablet by mouth Daily. 7 tablet 0   • potassium chloride 10 MEQ CR tablet      • predniSONE (DELTASONE) 20 MG tablet Take 60 mg daily for 2 days with food.  Taper by 20 mg every 2 days until complete 12 tablet 0   • rosuvastatin (CRESTOR) 20 MG tablet Take 20 mg by mouth.         Allergies   Allergen Reactions   • Codeine Nausea And Vomiting   • Penicillins Swelling       Past Surgical History:   Procedure Laterality Date   • CARDIAC SURGERY      loop recorder after stroke   • CATARACT EXTRACTION         OB History   No obstetric history on file.       Social History     Socioeconomic History   • Marital status:    Tobacco Use   • Smoking status: Former Smoker     Packs/day: 0.50      Years: 30.00     Pack years: 15.00     Types: Cigarettes   • Smokeless tobacco: Never Used   • Tobacco comment: quit 2 months ago   Vaping Use   • Vaping Use: Never used   Substance and Sexual Activity   • Alcohol use: Never   • Drug use: Never   • Sexual activity: Defer       Family History   Problem Relation Age of Onset   • Cancer Mother    • COPD Father        Objective     Vitals:    02/17/22 0038 02/17/22 0446 02/17/22 0720 02/17/22 0749   BP: 128/79 118/75  111/69   BP Location:  Right arm  Right arm   Patient Position:  Lying  Lying   Pulse: 74   83   Resp: 18 20 21 20   Temp: 98.4 °F (36.9 °C) 98.4 °F (36.9 °C)  98.7 °F (37.1 °C)   TempSrc: Oral Oral  Oral   SpO2: 97%   93%   Weight:  57.9 kg (127 lb 11.2 oz)     Height:                            ECOG Performance Status: 3 - Symptomatic, >50% confined to bed  General: well appearing female in no acute distress  Neuro/Psych: A&O x 3, gait steady, appropriate affect, strength 5/5 in all muscle groups  HEENT: sclerae anicteric, oropharynx clear  Lymphatics: no cervical, supraclavicular, or axillary adenopathy  Cardiovascular: regular rate and rhythm, no murmurs  Lungs: clear to auscultation bilaterally  Abdomen: soft, nontender, nondistended.  No palpable organomegaly  Extremities: no lower extremity edema  Skin: no rashes, lesions, bruising, or petechiae      Admission on 02/16/2022   Component Date Value Ref Range Status   • QT Interval 02/16/2022 328  ms Final   • QTC Interval 02/16/2022 437  ms Final   • Glucose 02/16/2022 128* 65 - 99 mg/dL Final   • BUN 02/16/2022 44* 8 - 23 mg/dL Final   • Creatinine 02/16/2022 0.77  0.57 - 1.00 mg/dL Final   • Sodium 02/16/2022 140  136 - 145 mmol/L Final   • Potassium 02/16/2022 3.8  3.5 - 5.2 mmol/L Final   • Chloride 02/16/2022 107  98 - 107 mmol/L Final   • CO2 02/16/2022 22.0  22.0 - 29.0 mmol/L Final   • Calcium 02/16/2022 8.5* 8.6 - 10.5 mg/dL Final   • Total Protein 02/16/2022 5.0* 6.0 - 8.5 g/dL Final   •  Albumin 02/16/2022 2.90* 3.50 - 5.20 g/dL Final   • ALT (SGPT) 02/16/2022 16  1 - 33 U/L Final   • AST (SGOT) 02/16/2022 20  1 - 32 U/L Final   • Alkaline Phosphatase 02/16/2022 46  39 - 117 U/L Final   • Total Bilirubin 02/16/2022 0.3  0.0 - 1.2 mg/dL Final   • eGFR Non  Amer 02/16/2022 72  >60 mL/min/1.73 Final   • Globulin 02/16/2022 2.1  gm/dL Final    Calculated Result   • A/G Ratio 02/16/2022 1.4  g/dL Final   • BUN/Creatinine Ratio 02/16/2022 57.1* 7.0 - 25.0 Final   • Anion Gap 02/16/2022 11.0  5.0 - 15.0 mmol/L Final   • proBNP 02/16/2022 1,521.0  0.0-1,800.0 pg/mL Final   • Troponin T 02/16/2022 0.030  0.000 - 0.030 ng/mL Final   • Extra Tube 02/16/2022 Hold for add-ons.   Final    Auto resulted.   • Extra Tube 02/16/2022 Hold for add-ons.   Final    Auto resulted.   • Extra Tube 02/16/2022 Hold for add-ons.   Final    Auto resulted.   • Extra Tube 02/16/2022 hold for add-on   Final    Auto resulted   • WBC 02/16/2022 18.18* 3.40 - 10.80 10*3/mm3 Final   • RBC 02/16/2022 2.10* 3.77 - 5.28 10*6/mm3 Final   • Hemoglobin 02/16/2022 7.0* 12.0 - 15.9 g/dL Final   • Hematocrit 02/16/2022 21.2* 34.0 - 46.6 % Final   • MCV 02/16/2022 101.0* 79.0 - 97.0 fL Final   • MCH 02/16/2022 33.3* 26.6 - 33.0 pg Final   • MCHC 02/16/2022 33.0  31.5 - 35.7 g/dL Final   • RDW 02/16/2022 20.9* 12.3 - 15.4 % Final   • RDW-SD 02/16/2022 72.4* 37.0 - 54.0 fl Final   • MPV 02/16/2022 12.1* 6.0 - 12.0 fL Final   • Platelets 02/16/2022 199  140 - 450 10*3/mm3 Final   • Neutrophil % 02/16/2022 86.0* 42.7 - 76.0 % Final   • Lymphocyte % 02/16/2022 3.5* 19.6 - 45.3 % Final   • Monocyte % 02/16/2022 4.5* 5.0 - 12.0 % Final   • Eosinophil % 02/16/2022 0.0* 0.3 - 6.2 % Final   • Basophil % 02/16/2022 0.2  0.0 - 1.5 % Final   • Immature Grans % 02/16/2022 5.8* 0.0 - 0.5 % Final   • Neutrophils, Absolute 02/16/2022 15.62* 1.70 - 7.00 10*3/mm3 Final   • Lymphocytes, Absolute 02/16/2022 0.64* 0.70 - 3.10 10*3/mm3 Final   • Monocytes,  Absolute 02/16/2022 0.82  0.10 - 0.90 10*3/mm3 Final   • Eosinophils, Absolute 02/16/2022 0.00  0.00 - 0.40 10*3/mm3 Final   • Basophils, Absolute 02/16/2022 0.04  0.00 - 0.20 10*3/mm3 Final   • Immature Grans, Absolute 02/16/2022 1.06* 0.00 - 0.05 10*3/mm3 Final   • nRBC 02/16/2022 0.6* 0.0 - 0.2 /100 WBC Final   • Protime 02/16/2022 23.7* 11.4 - 14.4 Seconds Final   • INR 02/16/2022 2.21* 0.85 - 1.16 Final   • Magnesium 02/16/2022 2.2  1.6 - 2.4 mg/dL Final   • Free T4 02/16/2022 0.86* 0.93 - 1.70 ng/dL Final   • TSH 02/16/2022 1.010  0.270 - 4.200 uIU/mL Final   • Color, UA 02/17/2022 Yellow  Yellow, Straw Final   • Appearance, UA 02/17/2022 Cloudy* Clear Final   • pH, UA 02/17/2022 5.5  5.0 - 8.0 Final   • Specific Gravity, UA 02/17/2022 1.032* 1.001 - 1.030 Final   • Glucose, UA 02/17/2022 Negative  Negative Final   • Ketones, UA 02/17/2022 Negative  Negative Final   • Bilirubin, UA 02/17/2022 Negative  Negative Final   • Blood, UA 02/17/2022 Small (1+)* Negative Final   • Protein, UA 02/17/2022 100 mg/dL (2+)* Negative Final   • Leuk Esterase, UA 02/17/2022 Trace* Negative Final   • Nitrite, UA 02/17/2022 Negative  Negative Final   • Urobilinogen, UA 02/17/2022 1.0 E.U./dL  0.2 - 1.0 E.U./dL Final   • Fecal Occult Blood 02/16/2022 Positive* Negative Final   • Lot Number 02/16/2022 [id number]   Final   • Expiration Date 02/16/2022 9-24   Final   • DEVELOPER LOT NUMBER 02/16/2022 [id number]   Final   • DEVELOPER EXPIRATION DATE 02/16/2022 6/24   Final   • Positive Control 02/16/2022 Positive  Positive Final   • Negative Control 02/16/2022 Positive* Negative Final   • ABO Type 02/16/2022 O   Final   • RH type 02/16/2022 Positive   Final   • Antibody Screen 02/16/2022 Negative   Final   • T&S Expiration Date 02/16/2022 2/19/2022 11:59:59 PM   Final   • Lactate 02/16/2022 2.4* 0.5 - 2.0 mmol/L Final    Falsely depressed results may occur on samples drawn from patients receiving N-Acetylcysteine (NAC) or Metamizole.   •  Procalcitonin 02/16/2022 0.11  0.00 - 0.25 ng/mL Final   • Lactate 02/16/2022 1.5  0.5 - 2.0 mmol/L Final    Falsely depressed results may occur on samples drawn from patients receiving N-Acetylcysteine (NAC) or Metamizole.   • Product Code 02/16/2022 [id number]   Final   • Unit Number 02/16/2022 [id number]   Final   • UNIT  ABO 02/16/2022 O   Final   • UNIT  RH 02/16/2022 POS   Final   • Crossmatch Interpretation 02/16/2022 Compatible   Final   • Dispense Status 02/16/2022 IS   Final   • Blood Expiration Date 02/16/2022 [id number]   Final   • Blood Type Barcode 02/16/2022 5100   Final   • Protime 02/17/2022 21.5* 11.4 - 14.4 Seconds Final   • INR 02/17/2022 1.96* 0.85 - 1.16 Final   • WBC 02/17/2022 10.91* 3.40 - 10.80 10*3/mm3 Final   • RBC 02/17/2022 2.41* 3.77 - 5.28 10*6/mm3 Final   • Hemoglobin 02/17/2022 7.8* 12.0 - 15.9 g/dL Final   • Hematocrit 02/17/2022 23.6* 34.0 - 46.6 % Final   • MCV 02/17/2022 97.9* 79.0 - 97.0 fL Final   • MCH 02/17/2022 32.4  26.6 - 33.0 pg Final   • MCHC 02/17/2022 33.1  31.5 - 35.7 g/dL Final   • RDW 02/17/2022 20.9* 12.3 - 15.4 % Final   • RDW-SD 02/17/2022 66.4* 37.0 - 54.0 fl Final   • MPV 02/17/2022 11.8  6.0 - 12.0 fL Final   • Platelets 02/17/2022 156  140 - 450 10*3/mm3 Final   • Neutrophil % 02/17/2022 83.8* 42.7 - 76.0 % Final   • Lymphocyte % 02/17/2022 4.5* 19.6 - 45.3 % Final   • Monocyte % 02/17/2022 4.4* 5.0 - 12.0 % Final   • Eosinophil % 02/17/2022 0.0* 0.3 - 6.2 % Final   • Basophil % 02/17/2022 0.2  0.0 - 1.5 % Final   • Immature Grans % 02/17/2022 7.1* 0.0 - 0.5 % Final   • Neutrophils, Absolute 02/17/2022 9.15* 1.70 - 7.00 10*3/mm3 Final   • Lymphocytes, Absolute 02/17/2022 0.49* 0.70 - 3.10 10*3/mm3 Final   • Monocytes, Absolute 02/17/2022 0.48  0.10 - 0.90 10*3/mm3 Final   • Eosinophils, Absolute 02/17/2022 0.00  0.00 - 0.40 10*3/mm3 Final   • Basophils, Absolute 02/17/2022 0.02  0.00 - 0.20 10*3/mm3 Final   • Immature Grans, Absolute 02/17/2022 0.77*  0.00 - 0.05 10*3/mm3 Final   • nRBC 02/17/2022 0.7* 0.0 - 0.2 /100 WBC Final   • Heparin Anti-Xa (UFH) 02/17/2022 >1.10* 0.30 - 0.70 IU/ml Final   • PTT 02/17/2022 25.6  22.0 - 39.0 seconds Final   • Glucose 02/17/2022 113* 65 - 99 mg/dL Final   • BUN 02/17/2022 42* 8 - 23 mg/dL Final   • Creatinine 02/17/2022 0.71  0.57 - 1.00 mg/dL Final   • Sodium 02/17/2022 140  136 - 145 mmol/L Final   • Potassium 02/17/2022 4.1  3.5 - 5.2 mmol/L Final   • Chloride 02/17/2022 108* 98 - 107 mmol/L Final   • CO2 02/17/2022 23.0  22.0 - 29.0 mmol/L Final   • Calcium 02/17/2022 8.1* 8.6 - 10.5 mg/dL Final   • eGFR Non  Amer 02/17/2022 80  >60 mL/min/1.73 Final   • BUN/Creatinine Ratio 02/17/2022 59.2* 7.0 - 25.0 Final   • Anion Gap 02/17/2022 9.0  5.0 - 15.0 mmol/L Final   • Hemoglobin 02/17/2022 7.6* 12.0 - 15.9 g/dL Final   • Hematocrit 02/17/2022 22.5* 34.0 - 46.6 % Final   • RBC Morphology 02/17/2022 Normal  Normal Final   • WBC Morphology 02/17/2022 Normal  Normal Final   • Platelet Morphology 02/17/2022 Normal  Normal Final   • RBC, UA 02/17/2022 0-2  None Seen, 0-2 /HPF Final   • WBC, UA 02/17/2022 3-5* None Seen, 0-2 /HPF Final   • Bacteria, UA 02/17/2022 Trace  None Seen, Trace /HPF Final   • Squamous Epithelial Cells, UA 02/17/2022 3-6* None Seen, 0-2 /HPF Final   • Hyaline Casts, UA 02/17/2022 0-6  0 - 6 /LPF Final   • Calcium Oxalate Crystals, UA 02/17/2022 Small/1+  None Seen /HPF Final   • Amorphous Crystals, UA 02/17/2022 Small/1+  None Seen /HPF Final   • Methodology 02/17/2022 Manual Light Microscopy   Final        No results found.    ASSESSMENT 76 years old female with symptomatic anemia    PROBLEM LIST AND DISCUSSION:  1.  Symptomatic anemia: Induced by previous chemotherapy as well as suppressed bone marrow from chronic inflammation and acute infection.  Rule out bleeding.  2.  Diffuse large B-cell lymphoma: Status post 3 cycles of chemotherapy using R-CHOP.  Patient scheduled for restaging work-up next  week.  3.  Covid pneumonia: Patient saturation is normal without any oxygen supplement.  Her levels drop with activity.  4.  Weakness    PLAN  1.  I reviewed the patient's chart including admission notes blood work results and imaging report.  I reviewed the patient's CT scan films myself.  2.  Agree with blood transfusion trying to maintain hemoglobin more than 8 given her weakness and shortness of breath.  3.  Her anticoagulation is on hold for now given her positive stool guaiac.  She may need to have GI evaluation.  4.  If hemoglobin remains stable after transfusion patient might be able to go home.  5.  Physical therapy evaluation.  6.  Follow-up with me as scheduled with repeated PET scan next week.    Discussed with patient's son Louis over the phone to coordinate patient care.  He helped me important parts of the medical history.  Adelaida Al MD    2/17/2022

## 2022-02-17 NOTE — CONSULTS
[facility] [place] [facility]    Referring Provider: Halle Winn MD   PCP: Sarah Redman APRN    Reason for Consultation: Symptomatic anemia     Chief complaint: Weakness     History of present illness:    Vale Cee is a 76 y.o. female who is admitted with symptomatic anemia.  She has diffuse large B-cell lymphoma, previously on chemotherapy, recent severe COVID-19 pneumonia and pulmonary embolism on anticoagulation with Apixiban.    She was recently discharged from our facility four days ago but has done poorly at home due to significant weakness.      H&H at discharge was 9.2 and 28.   H&H on arrival yesterday was 7.0 and 21.   MCV is 101.   Her stool was hemoccult positive.     She reports bright red blood in her stools for the last few days.   She had witnessed bright red blood per rectum today by her primary nurse.    Her last colonoscopy was approximately six years ago and she states she had 6 polyps removed.   She denies any recent change in bowel habits, constipation nor straining.       Allergies:  Codeine and Penicillins    Scheduled Meds:  !NOT ORDERED- apixaban (ELIQUIS), , Does not apply, Daily With Dinner  albuterol sulfate HFA, 2 puff, Inhalation, 4x Daily - RT  allopurinol, 300 mg, Oral, Daily  aspirin, 81 mg, Oral, Daily  dexamethasone, 6 mg, Oral, Daily With Breakfast  metoprolol succinate XL, 25 mg, Oral, Daily  mirtazapine, 15 mg, Oral, Nightly  rosuvastatin, 20 mg, Oral, Nightly  senna-docusate sodium, 2 tablet, Oral, BID  sodium chloride, 10 mL, Intravenous, Q12H  sodium chloride, 10 mL, Intravenous, Q12H         Infusions:  Pharmacy Consult,   lactated ringers, 75 mL/hr, Last Rate: 75 mL/hr (02/17/22 1040)  Pharmacy to Dose Heparin,         PRN Meds:  •  Pharmacy Consult  •  acetaminophen **OR** acetaminophen **OR** acetaminophen  •  senna-docusate sodium **AND** polyethylene glycol **AND** bisacodyl **AND** bisacodyl  •  heparin  •  LORazepam  •  melatonin  •  ondansetron  **OR** ondansetron  •  Pharmacy to Dose Heparin  •  sodium chloride  •  sodium chloride  •  sodium chloride  •  sodium chloride    Home Meds:  Medications Prior to Admission   Medication Sig Dispense Refill Last Dose   • acetaminophen (TYLENOL) 500 MG tablet Take 1 tablet by mouth Every 6 (Six) Hours As Needed for Mild Pain  or Moderate Pain .      • albuterol sulfate  (90 Base) MCG/ACT inhaler Inhale 2 puffs 4 (Four) Times a Day. 18 g 0    • allopurinol (Zyloprim) 300 MG tablet Take 1 tablet by mouth Daily. 30 tablet 1    • Apixaban Starter Pack (Eliquis DVT/PE Starter Pack) tablet therapy pack Take two 5 mg tablets by mouth every 12 hours for 7 days. Followed by one 5 mg tablet every 12 hours. (Dispense starter pack if available) 74 tablet 5    • Apremilast (OTEZLA PO) 30 mg.      • aspirin (Aspirin 81) 81 MG chewable tablet Aspir-81      • dexamethasone (DECADRON) 6 MG tablet Take 1 tablet by mouth Daily With Breakfast for 7 doses. 7 tablet 0    • lidocaine-prilocaine (EMLA) 2.5-2.5 % cream Apply 1 application topically to the appropriate area as directed As Needed (45-60 minutes prior to port access.  Cover with saran/plastic wrap.). 30 g 3    • megestrol (MEGACE) 40 MG/ML suspension TAKE 20 ML BY MOUTH  ONCE DAILY 480 mL 0    • metoprolol succinate XL (TOPROL-XL) 25 MG 24 hr tablet Take 1 tablet by mouth Daily.      • Metoprolol-hydroCHLOROthiazide (DUTOPROL PO) metoprolol rooney-hydrochlorothiaz      • mirtazapine (REMERON) 15 MG tablet Take 1 tablet by mouth Every Night. 30 tablet 1    • nitrofurantoin, macrocrystal-monohydrate, (MACROBID) 100 MG capsule       • nystatin (MYCOSTATIN) 100,000 unit/mL suspension Swish and swallow 5 mL 4 (Four) Times a Day. 200 mL 0    • ondansetron (ZOFRAN) 8 MG tablet Take 1 tablet by mouth 3 (Three) Times a Day As Needed for Nausea or Vomiting. 30 tablet 5    • ondansetron ODT (Zofran ODT) 4 MG disintegrating tablet Place 1 tablet on the tongue Every 8 (Eight) Hours As  Needed for Nausea or Vomiting. 30 tablet 0    • potassium chloride (K-DUR,KLOR-CON) 20 MEQ CR tablet Take 1 tablet by mouth Daily. 7 tablet 0    • potassium chloride 10 MEQ CR tablet       • predniSONE (DELTASONE) 20 MG tablet Take 60 mg daily for 2 days with food.  Taper by 20 mg every 2 days until complete 12 tablet 0    • rosuvastatin (CRESTOR) 20 MG tablet Take 20 mg by mouth.          ROS: Review of Systems   Constitutional: Positive for fatigue.   HENT: Negative.    Eyes: Negative.    Respiratory: Positive for shortness of breath.    Cardiovascular: Negative.    Gastrointestinal: Positive for blood in stool. Negative for abdominal pain, constipation, nausea and vomiting.   Endocrine: Negative.    Genitourinary: Negative.    Musculoskeletal: Negative.    Skin: Positive for pallor.   Allergic/Immunologic: Negative.    Neurological: Positive for weakness.   Hematological: Negative.    Psychiatric/Behavioral: Negative.        PAST MED HX:  Past Medical History:   Diagnosis Date   • Cancer (HCC)     Lymphoma   • CVA (cerebral vascular accident) (HCC)    • Fatigue    • Hypertension    • Weight loss, abnormal     30 lbs last 3 months       PAST SURG HX:  Past Surgical History:   Procedure Laterality Date   • CARDIAC SURGERY      loop recorder after stroke   • CATARACT EXTRACTION         FAM HX:  Family History   Problem Relation Age of Onset   • Cancer Mother    • COPD Father        SOC HX:  Social History     Socioeconomic History   • Marital status:    Tobacco Use   • Smoking status: Former Smoker     Packs/day: 0.50     Years: 30.00     Pack years: 15.00     Types: Cigarettes   • Smokeless tobacco: Never Used   • Tobacco comment: quit 2 months ago   Vaping Use   • Vaping Use: Never used   Substance and Sexual Activity   • Alcohol use: Never   • Drug use: Never   • Sexual activity: Defer       PHYSICAL EXAM  /70 (BP Location: Right arm, Patient Position: Lying)   Pulse 86   Temp 98.5 °F (36.9 °C)  "(Oral)   Resp 18   Ht 165.1 cm (65\")   Wt 57.9 kg (127 lb 11.2 oz)   SpO2 96%   BMI 21.25 kg/m²   Wt Readings from Last 3 Encounters:   02/17/22 57.9 kg (127 lb 11.2 oz)   02/14/22 58.2 kg (128 lb 5 oz)   02/12/22 58.2 kg (128 lb 4 oz)   ,body mass index is 21.25 kg/m².  Physical Exam  Constitutional:       General: She is not in acute distress.     Comments: Thin female resting supine.   No acute distress.  Appears her stated age or slightly younger.   Pleasant to speak with.     HENT:      Head: Normocephalic and atraumatic.      Mouth/Throat:      Mouth: Mucous membranes are moist.   Eyes:      General: No scleral icterus.  Cardiovascular:      Rate and Rhythm: Normal rate and regular rhythm.   Pulmonary:      Effort: Pulmonary effort is normal.      Breath sounds: Normal breath sounds.   Abdominal:      General: Bowel sounds are normal. There is no distension.      Palpations: Abdomen is soft.      Tenderness: There is no abdominal tenderness.   Musculoskeletal:      Right lower leg: No edema.      Left lower leg: No edema.   Skin:     Coloration: Skin is pale.   Neurological:      Mental Status: She is alert and oriented to person, place, and time.   Psychiatric:         Behavior: Behavior normal.       Results Review:   I reviewed the patient's new clinical results.    Lab Results   Component Value Date    WBC 10.91 (H) 02/17/2022    HGB 7.6 (L) 02/17/2022    HGB 7.8 (L) 02/17/2022    HGB 7.0 (L) 02/16/2022    HCT 22.5 (L) 02/17/2022    MCV 97.9 (H) 02/17/2022     02/17/2022       Lab Results   Component Value Date    INR 1.96 (H) 02/17/2022    INR 2.21 (H) 02/16/2022    INR 1.14 02/11/2022       Lab Results   Component Value Date    GLUCOSE 113 (H) 02/17/2022    BUN 42 (H) 02/17/2022    CREATININE 0.71 02/17/2022    EGFRIFNONA 80 02/17/2022    BCR 59.2 (H) 02/17/2022     02/17/2022    K 4.1 02/17/2022    CO2 23.0 02/17/2022    CALCIUM 8.1 (L) 02/17/2022    ALBUMIN 2.90 (L) 02/16/2022    " ALKPHOS 46 02/16/2022    BILITOT 0.3 02/16/2022    ALT 16 02/16/2022    AST 20 02/16/2022       ASSESSMENTS/PLANS    1. Bright red blood per rectum  2. Symptomatic anemia   3. History of colon polyps  4. Anticoagulation use, on Eliquis for pulmonary embolism   5. Diffuse large B-cell lymphoma     Ms. Cee is a pleasant 76 year old female with B-cell lymphoma, recent hospitalization for COVID-19 pneumonia and pulmonary embolism presents with symptomatic anemia and bright red blood per rectum on Eliquis.  Her last colonoscopy was approximately 6 years ago and she states she had 6 polyps removed at that time.   Suspect bleeding is secondary to hemorrhoidal source, bleeding polyp or diverticulosis.       >>> Recommend Colonoscopy tomorrow, 2/18/2022  >>> Split dose Moviprep to begin this evening   >>> Clear liquid diet today and NPO at midnight tonight     I discussed the patient's findings and my recommendations with patient    KADE Guardado  02/17/22  12:37 EST

## 2022-02-17 NOTE — PLAN OF CARE
Goal Outcome Evaluation:  Plan of Care Reviewed With: son        Progress: no change  Outcome Summary: Spoke with Dr Winn about pt. Pt has been declining since November. Sons are concerned pt is not getting better. They want pt to make decisions regarding care and are looking for input from what the doctors think is best.  For now colonoscopy and prep on hold until further conversation. Plans to meet with pt again tomorrow and conference call 3 sons. Nathan states he is on his way up from [place].  ammy Novak's other son who is local added to contact list.

## 2022-02-17 NOTE — PLAN OF CARE
Goal Outcome Evaluation:  Plan of Care Reviewed With: patient        Progress: no change   VSS. Pt remains on RA. 1 unit of PRBCs given per orders. PO eliquis and aspirin held per provider due to + occult stool. Pt has no complaints of pain or discomfort.

## 2022-02-17 NOTE — PLAN OF CARE
Goal Outcome Evaluation:  Plan of Care Reviewed With: patient        Progress: no change (PT eval)  Outcome Summary: Patient presents with generalized weakness, decreased activity tolerance, and  balance impairments affecting independence with mobility. She required Min A for transferring with RW, reporting dizziness and weakness. She declined ambulation for these reasons but gave good effort towards LE ther ex. Skilled IP PT warranted. Pt would also benefit fron OT consult. Recommend SNF at discharge to regain independence.

## 2022-02-18 LAB
BH BB BLOOD EXPIRATION DATE: NORMAL
BH BB BLOOD TYPE BARCODE: 5100
BH BB DISPENSE STATUS: NORMAL
BH BB PRODUCT CODE: NORMAL
BH BB UNIT NUMBER: NORMAL
CROSSMATCH INTERPRETATION: NORMAL
HCT VFR BLD AUTO: 25.3 % (ref 34–46.6)
HCT VFR BLD AUTO: 27.6 % (ref 34–46.6)
HGB BLD-MCNC: 8.4 G/DL (ref 12–15.9)
HGB BLD-MCNC: 9.4 G/DL (ref 12–15.9)
UFH PPP CHRO-ACNC: >1.1 IU/ML (ref 0.3–0.7)
UNIT  ABO: NORMAL
UNIT  RH: NORMAL

## 2022-02-18 PROCEDURE — 94761 N-INVAS EAR/PLS OXIMETRY MLT: CPT

## 2022-02-18 PROCEDURE — 63710000001 DEXAMETHASONE PER 0.25 MG: Performed by: INTERNAL MEDICINE

## 2022-02-18 PROCEDURE — 85018 HEMOGLOBIN: CPT | Performed by: NURSE PRACTITIONER

## 2022-02-18 PROCEDURE — 97530 THERAPEUTIC ACTIVITIES: CPT

## 2022-02-18 PROCEDURE — 99232 SBSQ HOSP IP/OBS MODERATE 35: CPT | Performed by: INTERNAL MEDICINE

## 2022-02-18 PROCEDURE — 94799 UNLISTED PULMONARY SVC/PX: CPT

## 2022-02-18 PROCEDURE — 97165 OT EVAL LOW COMPLEX 30 MIN: CPT

## 2022-02-18 PROCEDURE — 85014 HEMATOCRIT: CPT | Performed by: NURSE PRACTITIONER

## 2022-02-18 RX ORDER — MORPHINE SULFATE 20 MG/ML
5 SOLUTION ORAL
Status: DISCONTINUED | OUTPATIENT
Start: 2022-02-18 | End: 2022-02-19 | Stop reason: HOSPADM

## 2022-02-18 RX ORDER — MORPHINE SULFATE 20 MG/ML
5 SOLUTION ORAL
Qty: 30 ML | Refills: 0 | Status: SHIPPED | OUTPATIENT
Start: 2022-02-18 | End: 2022-03-20

## 2022-02-18 RX ORDER — HEPARIN SOD,PORCINE/0.9 % NACL 25000/250
18 INTRAVENOUS SOLUTION INTRAVENOUS
Status: DISCONTINUED | OUTPATIENT
Start: 2022-02-18 | End: 2022-02-18

## 2022-02-18 RX ORDER — HYDROCORTISONE ACETATE 25 MG/1
25 SUPPOSITORY RECTAL 2 TIMES DAILY
Status: DISCONTINUED | OUTPATIENT
Start: 2022-02-18 | End: 2022-02-19 | Stop reason: HOSPADM

## 2022-02-18 RX ADMIN — SODIUM CHLORIDE, PRESERVATIVE FREE 10 ML: 5 INJECTION INTRAVENOUS at 20:42

## 2022-02-18 RX ADMIN — ROSUVASTATIN CALCIUM 20 MG: 20 TABLET, FILM COATED ORAL at 20:40

## 2022-02-18 RX ADMIN — MIRTAZAPINE 15 MG: 15 TABLET, FILM COATED ORAL at 20:40

## 2022-02-18 RX ADMIN — ALBUTEROL SULFATE 2 PUFF: 90 AEROSOL, METERED RESPIRATORY (INHALATION) at 07:24

## 2022-02-18 RX ADMIN — APIXABAN 5 MG: 5 TABLET, FILM COATED ORAL at 14:38

## 2022-02-18 RX ADMIN — APIXABAN 5 MG: 5 TABLET, FILM COATED ORAL at 20:49

## 2022-02-18 RX ADMIN — SODIUM CHLORIDE, PRESERVATIVE FREE 10 ML: 5 INJECTION INTRAVENOUS at 08:45

## 2022-02-18 RX ADMIN — Medication 5 MG: at 20:40

## 2022-02-18 RX ADMIN — DEXAMETHASONE 6 MG: 2 TABLET ORAL at 08:45

## 2022-02-18 RX ADMIN — ALBUTEROL SULFATE 2 PUFF: 90 AEROSOL, METERED RESPIRATORY (INHALATION) at 17:22

## 2022-02-18 RX ADMIN — ALBUTEROL SULFATE 2 PUFF: 90 AEROSOL, METERED RESPIRATORY (INHALATION) at 20:12

## 2022-02-18 RX ADMIN — METOPROLOL SUCCINATE 25 MG: 25 TABLET, FILM COATED, EXTENDED RELEASE ORAL at 08:45

## 2022-02-18 RX ADMIN — ALBUTEROL SULFATE 2 PUFF: 90 AEROSOL, METERED RESPIRATORY (INHALATION) at 12:53

## 2022-02-18 RX ADMIN — ASPIRIN 81 MG 81 MG: 81 TABLET ORAL at 08:45

## 2022-02-18 RX ADMIN — SENNOSIDES AND DOCUSATE SODIUM 2 TABLET: 50; 8.6 TABLET ORAL at 08:45

## 2022-02-18 RX ADMIN — ALLOPURINOL 300 MG: 300 TABLET ORAL at 08:45

## 2022-02-18 RX ADMIN — SENNOSIDES AND DOCUSATE SODIUM 2 TABLET: 50; 8.6 TABLET ORAL at 20:39

## 2022-02-18 NOTE — THERAPY EVALUATION
Patient Name: Vale Cee  : 1945    MRN: [id number]                              Today's Date: 2022       Admit Date: 2022    Visit Dx:     ICD-10-CM ICD-9-CM   1. Pneumonia due to COVID-19 virus  U07.1 480.8    J12.82 079.89   2. Anemia, unspecified type  D64.9 285.9   3. Leukocytosis, unspecified type  D72.829 288.60   4. History of non-Hodgkin's lymphoma  Z85.72 V10.79   5. History of pulmonary embolism  Z86.711 V12.55   6. Fecal occult blood test positive  R19.5 792.1   7. BRBPR (bright red blood per rectum)  K62.5 569.3   8. 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)   • Encounter for central line care   • Pulmonary embolism (HCC)   • COVID-19 virus infection   • Symptomatic anemia   • BRBPR (bright red blood per rectum)   • Lactic acidosis   • Pneumonia due to COVID-19 virus     Past Medical History:   Diagnosis Date   • Cancer (HCC)     Lymphoma   • CVA (cerebral vascular accident) (HCC)    • Fatigue    • Hypertension    • Weight loss, abnormal     30 lbs last 3 months     Past Surgical History:   Procedure Laterality Date   • CARDIAC SURGERY      loop recorder after stroke   • CATARACT EXTRACTION        General Information     Row Name 22          OT Time and Intention    Document Type evaluation  -MA     Mode of Treatment occupational therapy  -MA     Row Name 22          General Information    Patient Profile Reviewed yes  -MA     Prior Level of Function min assist:; bed mobility; transfer; all household  mobility; max assist:; dressing  Pt reports recent functional decline requiring increased A for ambulation w/ RW. Pt reports her son provides A for LB ADLs at baseline.  -MA     Existing Precautions/Restrictions fall  -MA     Barriers to Rehab medically complex; previous functional deficit  -MA     Row Name 02/18/22 0930          Living Environment    Lives With child(khadar), adult  -MA     Row Name 02/18/22 0930          Home Main Entrance    Number of Stairs, Main Entrance none  -MA     Row Name 02/18/22 0930          Stairs Within Home, Primary    Number of Stairs, Within Home, Primary none  -MA     Row Name 02/18/22 0930          Cognition    Orientation Status (Cognition) oriented x 4  -MA     Row Name 02/18/22 0930          Safety Issues, Functional Mobility    Safety Issues Affecting Function (Mobility) safety precaution awareness; safety precautions follow-through/compliance; sequencing abilities  -MA     Impairments Affecting Function (Mobility) balance; endurance/activity tolerance; motor planning; strength; postural/trunk control; shortness of breath  -MA           User Key  (r) = Recorded By, (t) = Taken By, (c) = Cosigned By    Initials Name Provider Type    Meenu Wong OT Occupational Therapist                 Mobility/ADL's     Row Name 02/18/22 0931          Bed Mobility    Bed Mobility supine-sit  -MA     Supine-Sit [place] (Bed Mobility) supervision  -MA     Assistive Device (Bed Mobility) bed rails; head of bed elevated  -MA     Row Name 02/18/22 0931          Transfers    Transfers sit-stand transfer; bed-chair transfer  -MA     Bed-Chair [place] (Transfers) minimum assist (75% patient effort); 1 person assist; verbal cues  -MA     Assistive Device (Bed-Chair Transfers) other (see comments)  BUE support  -MA     Sit-Stand [place] (Transfers) contact guard; verbal cues  -MA     Row Name 02/18/22 0931          Sit-Stand Transfer    Assistive Device (Sit-Stand Transfers) --  No  AD  -MA     Row Name 02/18/22 0931          Activities of Daily Living    BADL Assessment/Intervention lower body dressing  Pt declined need for toileting, grooming tasks.  -MA     Row Name 02/18/22 0931          Lower Body Dressing Assessment/Training    [place] Level (Lower Body Dressing) don; socks; dependent (less than 25% patient effort)  -MA           User Key  (r) = Recorded By, (t) = Taken By, (c) = Cosigned By    Initials Name Provider Type    Meenu Wong OT Occupational Therapist               Obj/Interventions     Row Name 02/18/22 0932          Sensory Assessment (Somatosensory)    Sensory Assessment (Somatosensory) UE sensation intact  -MA     Row Name 02/18/22 0932          Vision Assessment/Intervention    Visual Impairment/Limitations WFL  -MA     Row Name 02/18/22 0932          Range of Motion Comprehensive    General Range of Motion bilateral upper extremity ROM WFL  -MA     Row Name 02/18/22 0932          Strength Comprehensive (MMT)    General Manual Muscle Testing (MMT) Assessment upper extremity strength deficits identified  -MA     Comment, General Manual Muscle Testing (MMT) Assessment BUE grossly 4-/5  -MA     Row Name 02/18/22 0932          Balance    Balance Assessment sitting static balance; standing static balance; standing dynamic balance  -MA     Static Sitting Balance WFL; unsupported; sitting, edge of bed; sitting in chair  -MA     Static Standing Balance WFL; supported; standing  -MA     Dynamic Standing Balance mild impairment; supported; standing  -MA     Balance Interventions sit to stand; occupation based/functional task  -MA           User Key  (r) = Recorded By, (t) = Taken By, (c) = Cosigned By    Initials Name Provider Type    Meenu Wong OT Occupational Therapist               Goals/Plan     Row Name 02/18/22 0936          Transfer Goal 1 (OT)    Activity/Assistive Device (Transfer Goal 1, OT) bed-to-chair/chair-to-bed; toilet; commode; walker, rolling   -MA     [place] Level/Cues Needed (Transfer Goal 1, OT) contact guard assist  -MA     Time Frame (Transfer Goal 1, OT) long term goal (LTG); 10 days  -MA     Progress/Outcome (Transfer Goal 1, OT) goal ongoing  -MA     Row Name 02/18/22 0936          Dressing Goal 1 (OT)    Activity/Device (Dressing Goal 1, OT) lower body dressing  don/doff socks w/ AAD  -MA     [place]/Cues Needed (Dressing Goal 1, OT) minimum assist (75% or more patient effort); verbal cues required  -MA     Time Frame (Dressing Goal 1, OT) long term goal (LTG); 10 days  -MA     Progress/Outcome (Dressing Goal 1, OT) goal ongoing  -MA     Row Name 02/18/22 0936          Toileting Goal 1 (OT)    Activity/Device (Toileting Goal 1, OT) adjust/manage clothing; perform perineal hygiene; commode; grab bar/safety frame  -MA     [place] Level/Cues Needed (Toileting Goal 1, OT) supervision required  -MA     Time Frame (Toileting Goal 1, OT) long term goal (LTG); 10 days  -MA     Progress/Outcome (Toileting Goal 1, OT) goal ongoing  -MA     Row Name 02/18/22 0936          Grooming Goal 1 (OT)    Activity/Device (Grooming Goal 1, OT) oral care; wash face, hands  standing sinkside  -MA     [place] (Grooming Goal 1, OT) set-up required; verbal cues required  -MA     Time Frame (Grooming Goal 1, OT) long term goal (LTG); 10 days  -MA     Progress/Outcome (Grooming Goal 1, OT) goal ongoing  -MA           User Key  (r) = Recorded By, (t) = Taken By, (c) = Cosigned By    Initials Name Provider Type    Meenu Wong OT Occupational Therapist               Clinical Impression     Row Name 02/18/22 0933          Pain Assessment    Additional Documentation Pain Scale: Numbers Pre/Post-Treatment (Group)  -[facility] Name 02/18/22 0933          Pain Scale: Numbers Pre/Post-Treatment    Pretreatment Pain Rating 0/10 - no pain  -MA     Posttreatment Pain Rating 0/10 - no pain  -MA     Row Name 02/18/22 0933          Plan of Care Review    Plan of  Care Reviewed With patient  -MA     Outcome Summary OT ana complete. Pt ADL independence limited d/t decreased activity tolerance, generalized weakness, and mild balance deficits limiting her ADL independence. Pt sup for bed mobility, CGA for STS, min Ax1 for SPT from bed-to-chair w/ BUE support, dep for LB ADLs. Pt would benefit from continued skilled IPOT services to address current functional deficits. Recommend SNF at discharge.  -MA     Row Name 02/18/22 0933          Therapy Assessment/Plan (OT)    Rehab Potential (OT) good, to achieve stated therapy goals  -MA     Criteria for Skilled Therapeutic Interventions Met (OT) yes; skilled treatment is necessary  -MA     Therapy Frequency (OT) daily  -MA     Row Name 02/18/22 0933          Therapy Plan Review/Discharge Plan (OT)    Anticipated Discharge Disposition (OT) skilled nursing facility  -MA     Row Name 02/18/22 0933          Vital Signs    Pre Systolic BP Rehab 120  -MA     Pre Treatment Diastolic BP 70  -MA     Post Systolic BP Rehab 114  -MA     Post Treatment Diastolic BP 69  -MA     Pretreatment Heart Rate (beats/min) 83  -MA     Posttreatment Heart Rate (beats/min) 84  -MA     Pre SpO2 (%) 91  -MA     O2 Delivery Pre Treatment room air  -MA     O2 Delivery Intra Treatment room air  -MA     Post SpO2 (%) 92  -MA     O2 Delivery Post Treatment room air  -MA     Pre Patient Position Supine  -MA     Intra Patient Position Standing  -MA     Post Patient Position Sitting  -MA     Row Name 02/18/22 0933          Positioning and Restraints    Pre-Treatment Position in bed  -MA     Post Treatment Position chair  -MA     In Chair reclined; call light within reach; encouraged to call for assist; exit alarm on; waffle cushion; legs elevated  -MA           User Key  (r) = Recorded By, (t) = Taken By, (c) = Cosigned By    Initials Name Provider Type    Meenu Wong, OT Occupational Therapist               Outcome Measures     Row Name 02/18/22 0937           How much help from another is currently needed...    Putting on and taking off regular lower body clothing? 2  -MA     Bathing (including washing, rinsing, and drying) 2  -MA     Toileting (which includes using toilet bed pan or urinal) 3  -MA     Putting on and taking off regular upper body clothing 3  -MA     Taking care of personal grooming (such as brushing teeth) 3  -MA     Eating meals 3  -MA     AM-PAC 6 Clicks Score (OT) 16  -MA     Row Name 02/18/22 0937          Functional Assessment    Outcome Measure Options AM-PAC 6 Clicks Daily Activity (OT)  -MA           User Key  (r) = Recorded By, (t) = Taken By, (c) = Cosigned By    Initials Name Provider Type    Meenu Wong OT Occupational Therapist                Occupational Therapy Education                 Title: PT OT SLP Therapies (In Progress)     Topic: Occupational Therapy (In Progress)     Point: ADL training (Done)     Description:   Instruct learner(s) on proper safety adaptation and remediation techniques during self care or transfers.   Instruct in proper use of assistive devices.              Learning Progress Summary           Patient Acceptance, E, VU by MA at 2/18/2022 0937                   Point: Home exercise program (Not Started)     Description:   Instruct learner(s) on appropriate technique for monitoring, assisting and/or progressing therapeutic exercises/activities.              Learner Progress:  Not documented in this visit.          Point: Precautions (Done)     Description:   Instruct learner(s) on prescribed precautions during self-care and functional transfers.              Learning Progress Summary           Patient Acceptance, E, VU by MA at 2/18/2022 0937                   Point: Body mechanics (Done)     Description:   Instruct learner(s) on proper positioning and spine alignment during self-care, functional mobility activities and/or exercises.              Learning Progress Summary           Patient Acceptance, E, VU  by MA at 2/18/2022 0937                               User Key     Initials Effective Dates Name Provider Type Discipline    MA 11/19/20 -  Meenu Pizano OT Occupational Therapist OT              OT Recommendation and Plan  Therapy Frequency (OT): daily  Plan of Care Review  Plan of Care Reviewed With: patient  Outcome Summary: OT eval complete. Pt ADL independence limited d/t decreased activity tolerance, generalized weakness, and mild balance deficits limiting her ADL independence. Pt sup for bed mobility, CGA for STS, min Ax1 for SPT from bed-to-chair w/ BUE support, dep for LB ADLs. Pt would benefit from continued skilled IPOT services to address current functional deficits. Recommend SNF at discharge.     Time Calculation:    Time Calculation- OT     Row Name 02/18/22 0937             Time Calculation- OT    OT Start Time 0902  -MA      OT Received On 02/18/22  -MA      OT Goal Re-Cert Due Date 02/28/22  -MA              Timed Charges    67316 - OT Therapeutic Activity Minutes 6  -MA      47166 - OT Self Care/Mgmt Minutes 2  -MA              Untimed Charges    OT Eval/Re-eval Minutes 38  -MA              Total Minutes    Timed Charges Total Minutes 8  -MA      Untimed Charges Total Minutes 38  -MA       Total Minutes 46  -MA            User Key  (r) = Recorded By, (t) = Taken By, (c) = Cosigned By    Initials Name Provider Type    Meenu Wong OT Occupational Therapist              Therapy Charges for Today     Code Description Service Date Service Provider Modifiers Qty    67503340705  OT THERAPEUTIC ACT EA 15 MIN 2/18/2022 Meenu Pizano OT GO 1    28106501754 HC OT EVAL LOW COMPLEXITY 3 2/18/2022 Meenu Pizano OT GO 1               Meenu Pizano OT  2/18/2022

## 2022-02-18 NOTE — PLAN OF CARE
Goal Outcome Evaluation:  Plan of Care Reviewed With: patient           Outcome Summary: OT ana complete. Pt ADL independence limited d/t decreased activity tolerance, generalized weakness, and mild balance deficits limiting her ADL independence. Pt sup for bed mobility, CGA for STS, min Ax1 for SPT from bed-to-chair w/ BUE support, dep for LB ADLs. Pt would benefit from continued skilled IPOT services to address current functional deficits. Recommend SNF at discharge.

## 2022-02-18 NOTE — PROGRESS NOTES
[facility]  PROGRESS NOTE    Patient Name: Vale Cee  : 1945  MRN: [id number]    Date of Admission: 2022  Primary Care Physician: Sarah Redman APRN    Subjective   Subjective     CC:  Anemia, failure to thrive    HPI:  Patient is doing okay this morning.  Ate a little bit more of her food this morning.  Anxious about getting home.  Decided to go with hospice.    ROS:  General: denies fevers or chills  CV: denies chest pain  Resp: denies shortness of breath  Abd: denies abd pain, nausea      Objective   Objective     Vital Signs:   Temp:  [97.6 °F (36.4 °C)-98.5 °F (36.9 °C)] 98.3 °F (36.8 °C)  Heart Rate:  [71-90] 81  Resp:  [16-20] 16  BP: (108-125)/(67-78) 120/70     Physical Exam:  Constitutional: No acute distress, awake, alert  Respiratory: Clear to auscultation bilaterally, respiratory effort normal   Cardiovascular: RRR, no murmurs, rubs, or gallops  Gastrointestinal: Positive bowel sounds, soft, nontender, nondistended  Musculoskeletal: No bilateral ankle edema  Psychiatric: Appropriate affect, cooperative  Neurologic: Oriented x 3, no focal neurological deficits  Skin: No rashes      Results Reviewed:  LAB RESULTS:      Lab 22  0439 22  2317 22  1219 22  0742 22  0415 224 22  1047 22  1047 22  0931 22  0931 22  1611 22  1611   WBC  --   --   --   --  10.91*  --   --  18.18*  --  8.63  --  6.29   HEMOGLOBIN 8.4* 8.3* 7.8* 7.6* 7.8*  --    < > 7.0*   < > 9.2*   < > 8.2*   HEMATOCRIT 25.3* 24.2* 23.0* 22.5* 23.6*  --    < > 21.2*   < > 28.9*   < > 24.5*   PLATELETS  --   --   --   --  156  --   --  199  --  222  --  175   NEUTROS ABS  --   --   --   --  9.15*  --   --  15.62*  --  7.20*  --  5.36   IMMATURE GRANS (ABS)  --   --   --   --  0.77*  --   --  1.06*  --  0.53*  --  0.31*   LYMPHS ABS  --   --   --   --  0.49*  --   --  0.64*  --  0.41*  --  0.29*   MONOS  ABS  --   --   --   --  0.48  --   --  0.82  --  0.45  --  0.32   EOS ABS  --   --   --   --  0.00  --   --  0.00  --  0.00  --  0.00   MCV  --   --   --   --  97.9*  --   --  101.0*  --  101.4*  --  97.6*   SED RATE  --   --   --   --   --   --   --   --   --   --   --  48*   CRP  --   --   --   --   --   --   --   --   --  1.34*  --  3.50*   PROCALCITONIN  --   --   --   --   --   --   --  0.11  --   --   --  0.09   LACTATE  --   --   --   --   --  1.5  --  2.4*  --   --   --  1.6   LDH  --   --   --   --   --   --   --   --   --   --   --  444*   PROTIME  --   --   --   --  21.5*  --   --  23.7*  --   --   --  14.3   APTT  --   --   --   --  25.6  --   --   --   --   --   --   --    HEPARIN ANTI-XA  --  >1.10*  --  >1.10* >1.10*  --   --   --   --   --   --   --     < > = values in this interval not displayed.         Lab 02/17/22  0415 02/16/22 1047 02/13/22 0931 02/11/22  1611   SODIUM 140 140 141 141   POTASSIUM 4.1 3.8 3.9 3.5   CHLORIDE 108* 107 107 107   CO2 23.0 22.0 24.0 25.0   ANION GAP 9.0 11.0 10.0 9.0   BUN 42* 44* 27* 17   CREATININE 0.71 0.77 0.84 0.82   GLUCOSE 113* 128* 110* 125*   CALCIUM 8.1* 8.5* 8.7 8.5*   MAGNESIUM  --  2.2  --  2.0   TSH  --  1.010  --   --          Lab 02/16/22 1047 02/13/22  0931 02/11/22  1611   TOTAL PROTEIN 5.0* 5.5* 5.6*   ALBUMIN 2.90* 3.10* 2.80*   GLOBULIN 2.1 2.4 2.8   ALT (SGPT) 16 9 9   AST (SGOT) 20 13 14   BILIRUBIN 0.3 0.3 0.2   ALK PHOS 46 56 55         Lab 02/17/22  0415 02/16/22  1047 02/11/22  1611   PROBNP  --  1,521.0 1,152.0   TROPONIN T  --  0.030 0.021   PROTIME 21.5* 23.7* 14.3   INR 1.96* 2.21* 1.14             Lab 02/16/22  1311   ABO TYPING O   RH TYPING Positive   ANTIBODY SCREEN Negative         Brief Urine Lab Results  (Last result in the past 365 days)      Color   Clarity   Blood   Leuk Est   Nitrite   Protein   CREAT   Urine HCG        02/17/22 0400 Yellow   Cloudy   Small (1+)   Trace   Negative   100 mg/dL (2+)                  Microbiology Results Abnormal     Procedure Component Value - Date/Time    Blood Culture - Blood, Arm, Right [[id number]]  (Normal) Collected: 02/16/22 1230    Lab Status: Preliminary result Specimen: Blood from Arm, Right Updated: 02/17/22 1315     Blood Culture No growth at 24 hours    Blood Culture - Blood, Arm, Left [[id number]]  (Normal) Collected: 02/16/22 1200    Lab Status: Preliminary result Specimen: Blood from Arm, Left Updated: 02/17/22 1315     Blood Culture No growth at 24 hours          CT Head Without Contrast    Result Date: 2/16/2022  EXAMINATION: CT HEAD WO CONTRAST-  INDICATION: Generalized weakness  TECHNIQUE: Axial noncontrast CT of the head with multiplanar reconstruction  The radiation dose reduction device was turned on for each scan per the ALARA (As Low as Reasonably Achievable) protocol.  COMPARISON: 11/18/2021  FINDINGS: Gray-white differentiation is maintained and there is no evidence of intracranial hemorrhage, mass or mass effect. Age-related changes of the brain are present including volume loss and typical periventricular sequela of chronic small vessel ischemia. There is otherwise no evidence of intracranial hemorrhage, mass or mass effect. The ventricles are normal in size and configuration accounting for surrounding volume loss. The orbits are normal. There is fluid opacification including aerated secretions noted in the left maxillary sinus and left sphenoid chamber.      Impression: Age-related changes of the brain as above, otherwise without evidence of acute intracranial abnormality.  Aerated secretions and mucosal thickening in the left sphenoid chamber and left maxillary sinus, consistent with acute sinusitis in the correct clinical setting.   This report was finalized on 2/16/2022 1:25 PM by Kavin Sandhu.      XR Chest 1 View    Result Date: 2/16/2022  DATE OF EXAM: 2/16/2022 10:35 AM  PROCEDURE: XR CHEST 1 VW-  INDICATIONS: SOA triage protocol  COMPARISON: CT  chest February 11, 2022  TECHNIQUE: Single radiographic AP view of the chest was obtained.  FINDINGS: A port catheter is noted with its tip in the superior vena cava. A cardiac recording device is seen overlying the left chest. There are bibasilar densities which could relate to atelectasis or sequela of more recent inflammatory process. There are some vague interstitial changes at least more peripherally within the right lung. There are no definite pleural effusions. It looks like there is an element of COPD.      Impression: 1.  There are some peripheral vague densities within the right lung in addition bibasilar areas of density. This could reflect sequela to recent Covid pneumonia. Some atelectasis might also account for at least some of the appearance to the basilar areas.  This report was finalized on 2/16/2022 10:45 AM by Damaso Dutta MD.      CT Angiogram Chest    Result Date: 2/16/2022  EXAMINATION: CT ANGIOGRAM CHEST-  INDICATION: SOB  TECHNIQUE: Axial pulmonary arterial phase IV contrast enhanced CT angiogram of the chest per PE protocol. Two-dimensional reconstructions were postprocessed.  The radiation dose reduction device was turned on for each scan per the ALARA (As Low as Reasonably Achievable) protocol.  COMPARISON: 2/11/2022  FINDINGS: No pathologic axillary adenopathy or other worrisome body wall soft tissue findings in the chest. No acute findings in the partially imaged upper abdomen. No pleural or pericardial effusion. No pathologic mediastinal or hilar lymphadenopathy. Nonaneurysmal mildly atherosclerotic thoracic aorta. Evaluation of the osseous structures demonstrates no evidence of acute fracture or aggressive osseous lesion, with redemonstrated spondylosis and exaggerated kyphosis. There is redemonstration of a filling defect within the branching left lower lobe segmental pulmonary artery branch, unchanged from recent comparison. No new or more extensive coronary emboli evident.  Evaluation of the lung fields redemonstrates peripheral predominant areas of groundglass opacity and reticulation compatible with Covid 19 related pneumonia.      Impression: Unchanged small filling defect within the left lower lobe segmental branch compatible with small pulmonary embolus. There is no evidence of new or progressive pulmonary emboli. No evidence of right heart strain.  Redemonstrated findings of likely Covid 19 related pneumonia with extensive peripheral reticulation and groundglass opacity.   This report was finalized on 2/16/2022 1:28 PM by Kavin Sandhu.        Results for orders placed during the hospital encounter of 02/11/22    Adult Transthoracic Echo Complete w/ Color, Spectral and Contrast if necessary per protocol    Interpretation Summary  · Normal LV systolic function  · No significant valvular abnormality      I have reviewed the medications:  Scheduled Meds:albuterol sulfate HFA, 2 puff, Inhalation, 4x Daily - RT  allopurinol, 300 mg, Oral, Daily  aspirin, 81 mg, Oral, Daily  dexamethasone, 6 mg, Oral, Daily With Breakfast  metoprolol succinate XL, 25 mg, Oral, Daily  mirtazapine, 15 mg, Oral, Nightly  rosuvastatin, 20 mg, Oral, Nightly  senna-docusate sodium, 2 tablet, Oral, BID  sodium chloride, 10 mL, Intravenous, Q12H  sodium chloride, 10 mL, Intravenous, Q12H      Continuous Infusions:Pharmacy to Dose Heparin,       PRN Meds:.•  acetaminophen **OR** acetaminophen **OR** acetaminophen  •  senna-docusate sodium **AND** polyethylene glycol **AND** bisacodyl **AND** bisacodyl  •  heparin  •  LORazepam  •  melatonin  •  ondansetron **OR** ondansetron  •  Pharmacy to Dose Heparin  •  sodium chloride  •  sodium chloride  •  sodium chloride  •  sodium chloride    Assessment/Plan   Assessment & Plan     Active Hospital Problems    Diagnosis  POA   • Pneumonia due to COVID-19 virus [U07.1, J12.82]  Yes   • Symptomatic anemia [D64.9]  Yes   • BRBPR (bright red blood per rectum) [K62.5]   Unknown   • Lactic acidosis [E87.2]  Unknown   • Pulmonary embolism (HCC) [I26.99]  Yes   • 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) [C83.33]  Yes      Resolved Hospital Problems   No resolved problems to display.        Vale Cee is a 76 y.o. female with history of diffuse large B-cell lymphoma on R-CHOP, recent Covid infection, PE who presents with weakness.  Patient was recently admitted to the hospital from 2/11/2022 to 2/13/2022 for severe bilateral Covid pneumonia and an acute PE.      Failure to thrive  -Patient recently discharged from the hospital with worsening weakness, decreased p.o. intake.  -Continue IVF  -Hold hydrochlorothiazide  -Continue Megace  -PT/OT  -Hospice consulted, plan to go home with hospice tomorrow     Bright red blood per rectum  -Patient with bright red blood per rectum  -On anticoagulation for a PE.  Unfortunately we need to hold this in the setting of active bleeding  -Fecal occult positive  -Received 1 unit PRBCs with improvement  -Discussed with son the fact that she has a PE as well as bleeding which is a challenging situation.  I told him that without the blood thinner her PE could get worse and she could have respiratory failure and with the blood thinner she could have a significant GI bleed.  I also discussed with Janet Cardoza with GI and she recommends that we start Anusol suppositories for 5 days if we do start the Xarelto.  What we have decided is to go ahead and start the Xarelto here in the hospital and monitor overnight.  Hemoglobin in the a.m.  If she has bleeding then can discontinue the Xarelto.  Son is agreeable to this plan    PE  -Patient on anticoagulation for PE, holding this in the setting of bleeding     Leukocytosis  -Low suspicion for infection, procalcitonin normal.  Suspect this is likely secondary to steroids.     Diffuse large B-cell lymphoma  -Consult Dr. Al, unclear if the patient will be a candidate for further  chemotherapy given her functional status currently     Recent Covid  -Patient was discharged with 5 days of dexamethasone.  Will continue with an additional 3 days of dexamethasone to complete her 10-day course.  -Currently on room air  -She can be removed from precautions 2/20/2022    Discussed plan of care with son, Louis.      DVT prophylaxis:  SCDs, holding apixban in the setting of bleeding     DVT prophylaxis:  Medical DVT prophylaxis orders are present.       AM-PAC 6 Clicks Score (PT): 16 (02/17/22 4268)    Disposition: I expect the patient to be discharged home tomorrow with hospice.    CODE STATUS:   Code Status and Medical Interventions:   Ordered at: 02/17/22 1059     Medical Intervention Limits:    NO intubation (DNI)    NO artificial nutrition    NO dialysis     Level Of Support Discussed With:    Patient     Code Status (Patient has no pulse and is not breathing):    No CPR (Do Not Attempt to Resuscitate)     Medical Interventions (Patient has pulse or is breathing):    Limited Support     I have prepared this progress note with copied portions of the prior day's progress note of my own authorship to preserve accuracy and maintain consistency of documentation. I have reviewed these portions and edited them for correctness. I verify that the above documentation accurately and truly represents the evaluation and management performed on today's date.       Halle Winn MD  02/18/22

## 2022-02-18 NOTE — CASE MANAGEMENT/SOCIAL WORK
Continued Stay Note  [facility]     Patient Name: Vale Cee  MRN: [id number]  Today's Date: 2/18/2022    Admit Date: 2/16/2022     Discharge Plan     Row Name 02/18/22 1312       Plan    Plan CM update    Plan Comments Patient will be going home with Hospice - Please see Hospice CM note for details - EMS arranged for tomorrow- michael [phone]    Final Discharge Disposition Code 50 - home with hospice               Discharge Codes    No documentation.                     Michael Melchor RN

## 2022-02-18 NOTE — PROGRESS NOTES
Continued Stay Note  [facility]     Patient Name: Vale Cee  MRN: [id number]  Today's Date: 2/18/2022    Admit Date: 2/16/2022     Discharge Plan     Row Name 02/18/22 1826       Plan    Plan Home with [facility]    Plan Comments Hospice referral received, chart reviewed. Telephone call to pt's son Louis regarding hospice referral. Louis stated is aware of the referral. Teaching done on hospice philosophy, goals of care and services. Louis stated wants to bring pt home with hospice, wants to focus on quality of life with comfort measures. Discussed equipment needs, hospital bed, overbed table, transport w/c with cushion to be delivered to pt's home tomorrow prior to discharged. Spoke with GAVINO Melendez, [facility], no transportation available until next week. Spoke with [facility] regarding transportation, was informed EMS can  the pt tomorrow at 1400. Message sent to Dr. Winn regarding discharge plans, Dr. Winn agreed with pt discharging tomorrow. Informed Louis of  time, discussed the EMS/DNR form, Louis stated pt can sign the form. Informed pt's staff nurse and  of tomorrow's discharge. Please call [phone] if can be of further assistance.               Discharge Codes    No documentation.                     Abiola Roberson, RN

## 2022-02-18 NOTE — PLAN OF CARE
Problem: Depression (Hospitalized Older Adult)  Goal: Depressive Symptoms Identified and Managed  Intervention: Monitor and Manage Depressive Symptoms  Recent Flowsheet Documentation  Taken 2/18/2022 1517 by Charity Rodriguez RN  Supportive Measures:   active listening utilized   goal setting facilitated   verbalization of feelings encouraged   positive reinforcement provided  Family/Support System Care:   presence promoted   support provided   involvement promoted     Problem: Adjustment to Illness (Gastrointestinal Bleeding)  Goal: Optimal Coping with Acute Illness  Intervention: Optimize Psychosocial Response to Unexpected Illness  Recent Flowsheet Documentation  Taken 2/18/2022 1517 by Charity Rodriguez RN  Supportive Measures:   active listening utilized   goal setting facilitated   verbalization of feelings encouraged   positive reinforcement provided   Goal Outcome Evaluation:  Plan of Care Reviewed With: patient        Progress: no change  Outcome Summary: Pt is sitting  up in the chair. pt has dyspnea on exertion. Pt is currently on room air. Pt will have 02 at home for comfort. prn morphine for resp distress. Pt is not having colonoscopy and resume diet. plans for home with hospice tomorrow. support to pt      Palliative Team meeting 1330, Mike George DO, Aurora Oneil APRN, Charity Rodriguez RN, CHPN, Abiola Roberson RN CHPN, Blanche MAURICIOW,  Radha Minor RN, BSN, CHPN, Elisha Echeverria MDIV

## 2022-02-18 NOTE — PROGRESS NOTES
Palliative Care Progress Note    Date of Admission: 2/16/2022    Subjective:  Pt sttes that she just feels weak.  Current Code Status     Date Active Code Status Order ID Comments User Context       2/18/2022 1324 No CPR (Do Not Attempt to Resuscitate) [id number]  Mike George, DO Inpatient     Advance Care Planning Activity      Questions for Current Code Status     Question Answer    Code Status (Patient has no pulse and is not breathing) No CPR (Do Not Attempt to Resuscitate)    Medical Interventions (Patient has pulse or is breathing) Comfort Measures        No current facility-administered medications on file prior to encounter.     Current Outpatient Medications on File Prior to Encounter   Medication Sig Dispense Refill   • acetaminophen (TYLENOL) 500 MG tablet Take 1 tablet by mouth Every 6 (Six) Hours As Needed for Mild Pain  or Moderate Pain .     • albuterol sulfate  (90 Base) MCG/ACT inhaler Inhale 2 puffs 4 (Four) Times a Day. 18 g 0   • allopurinol (Zyloprim) 300 MG tablet Take 1 tablet by mouth Daily. 30 tablet 1   • Apixaban Starter Pack (Eliquis DVT/PE Starter Pack) tablet therapy pack Take two 5 mg tablets by mouth every 12 hours for 7 days. Followed by one 5 mg tablet every 12 hours. (Dispense starter pack if available) 74 tablet 5   • Apremilast (OTEZLA PO) 30 mg.     • aspirin (Aspirin 81) 81 MG chewable tablet Aspir-81     • dexamethasone (DECADRON) 6 MG tablet Take 1 tablet by mouth Daily With Breakfast for 7 doses. 7 tablet 0   • lidocaine-prilocaine (EMLA) 2.5-2.5 % cream Apply 1 application topically to the appropriate area as directed As Needed (45-60 minutes prior to port access.  Cover with saran/plastic wrap.). 30 g 3   • megestrol (MEGACE) 40 MG/ML suspension TAKE 20 ML BY MOUTH  ONCE DAILY 480 mL 0   • metoprolol succinate XL (TOPROL-XL) 25 MG 24 hr tablet Take 1 tablet by mouth Daily.     • Metoprolol-hydroCHLOROthiazide (DUTOPROL PO) metoprolol rooney-hydrochlorothiaz     •  "mirtazapine (REMERON) 15 MG tablet Take 1 tablet by mouth Every Night. 30 tablet 1   • nitrofurantoin, macrocrystal-monohydrate, (MACROBID) 100 MG capsule      • nystatin (MYCOSTATIN) 100,000 unit/mL suspension Swish and swallow 5 mL 4 (Four) Times a Day. 200 mL 0   • ondansetron (ZOFRAN) 8 MG tablet Take 1 tablet by mouth 3 (Three) Times a Day As Needed for Nausea or Vomiting. 30 tablet 5   • ondansetron ODT (Zofran ODT) 4 MG disintegrating tablet Place 1 tablet on the tongue Every 8 (Eight) Hours As Needed for Nausea or Vomiting. 30 tablet 0   • potassium chloride (K-DUR,KLOR-CON) 20 MEQ CR tablet Take 1 tablet by mouth Daily. 7 tablet 0   • potassium chloride 10 MEQ CR tablet      • predniSONE (DELTASONE) 20 MG tablet Take 60 mg daily for 2 days with food.  Taper by 20 mg every 2 days until complete 12 tablet 0   • rosuvastatin (CRESTOR) 20 MG tablet Take 20 mg by mouth.          •  acetaminophen **OR** acetaminophen **OR** acetaminophen  •  senna-docusate sodium **AND** polyethylene glycol **AND** bisacodyl **AND** bisacodyl  •  heparin  •  LORazepam  •  melatonin  •  ondansetron **OR** ondansetron  •  sodium chloride  •  sodium chloride  •  sodium chloride  •  sodium chloride    Objective: /68 (BP Location: Left arm, Patient Position: Sitting)   Pulse 90   Temp 98.9 °F (37.2 °C) (Oral)   Resp 16   Ht 165.1 cm (65\")   Wt 57.9 kg (127 lb 11.2 oz)   SpO2 96%   BMI 21.25 kg/m²      Intake/Output Summary (Last 24 hours) at 2/18/2022 1324  Last data filed at 2/18/2022 0724  Gross per 24 hour   Intake 345 ml   Output 400 ml   Net -55 ml     Physical Exam:      General Appearance:    Alert, cooperative, in no acute distress   Head:    Normocephalic, without obvious abnormality, atraumatic   Eyes:            Lids and lashes normal, conjunctivae and sclerae normal, no   icterus, no pallor, corneas clear, PERRLA   Ears:    Ears appear intact with no abnormalities noted   Throat:   No oral lesions, no thrush, " oral mucosa moist   Neck:   No adenopathy, supple, trachea midline, no thyromegaly, no     carotid bruit, no JVD   Back:     No kyphosis present, no scoliosis present, no skin lesions,       erythema or scars, no tenderness to percussion or                   palpation,   range of motion normal   Lungs:     Clear to auscultation,respirations regular, even and                   unlabored    Heart:    Regular rhythm and normal rate, normal S1 and S2, no            murmur, no gallop, no rub, no click   Breast Exam:    Deferred   Abdomen:     Normal bowel sounds, no masses, no organomegaly, soft        non-tender, non-distended, no guarding, no rebound                 tenderness   Genitalia:    Deferred   Extremities:   Moves all extremities well, no edema, no cyanosis, no              redness   Pulses:   Pulses palpable and equal bilaterally   Skin:   No bleeding, bruising or rash   Lymph nodes:   No palpable adenopathy   Neurologic:   Cranial nerves 2 - 12 grossly intact, sensation intact, DTR        present and equal bilaterally     Results from last 7 days   Lab Units 02/18/22  1050 02/17/22  0742 02/17/22  0415   WBC 10*3/mm3  --   --  10.91*   HEMOGLOBIN g/dL 9.4*   < > 7.8*   HEMATOCRIT % 27.6*   < > 23.6*   PLATELETS 10*3/mm3  --   --  156    < > = values in this interval not displayed.     Results from last 7 days   Lab Units 02/17/22  0415 02/16/22  1047 02/16/22  1047   SODIUM mmol/L 140   < > 140   POTASSIUM mmol/L 4.1   < > 3.8   CHLORIDE mmol/L 108*   < > 107   CO2 mmol/L 23.0   < > 22.0   BUN mg/dL 42*   < > 44*   CREATININE mg/dL 0.71   < > 0.77   CALCIUM mg/dL 8.1*   < > 8.5*   BILIRUBIN mg/dL  --   --  0.3   ALK PHOS U/L  --   --  46   ALT (SGPT) U/L  --   --  16   AST (SGOT) U/L  --   --  20   GLUCOSE mg/dL 113*   < > 128*    < > = values in this interval not displayed.       Impression: FTT  B cell lymphoma  Debility  GOC  Plan: Talk to the patient as well as the patient's 3 children.  Overall the  plan is to focus on comfort and the patient wants go home with hospice.  Hospice consult will be placed.        Mike George DO  02/18/22  13:24 EST

## 2022-02-18 NOTE — CONSULTS
Nutrition Services    Patient Name:  Vale Cee  YOB: 1945  MRN: [id number]  Admit Date:  2/16/2022    Consult received 2/2 nutrition screen Pt to be seen 2/18.    Electronically signed by:  Lupe Rajan RD  02/17/22 20:02 EST

## 2022-02-18 NOTE — DISCHARGE PLACEMENT REQUEST
"Edvin Lizarragae EDWIGE (76 y.o. Female)     Referred by Dr. SPARKLE George  PCP-family requesting hospice physician for attending as pt does not have a PCP  Dx-Diffuse Large B cell Lymphoma/anemia  Tested POSITIVE for covid 19 on 2/1/2022          Date of Birth Social Security Number Address Home Phone MRN    1945  [id number] [facility] [address] [phone] [id number]    Advent Marital Status             Pres[employer]        Admission Date Admission Type Admitting Provider Attending Provider Department, Room/Bed    2/16/22 Emergency Halle Winn MD Seward, Kathryn L, MD [address], [address]    Discharge Date Discharge Disposition Discharge Destination                         Attending Provider: Halle Winn MD    Allergies: Codeine, Penicillins    Isolation: Contact Air   Infection: COVID (confirmed) (02/12/22)   Code Status: No CPR   Advance Care Planning Activity    Ht: 165.1 cm (65\")   Wt: 57.9 kg (127 lb 11.2 oz)    Admission Cmt: None   Principal Problem: None                Active Insurance as of 2/16/2022     Primary Coverage     Payor Plan Insurance Group Employer/Plan Group    MEDICARE MEDICARE A & B      Payor Plan Address Payor Plan Phone Number Payor Plan Fax Number Effective Dates    [address] [phone]  9/1/2008 - None Entered    [facility] [id number]       Subscriber Name Subscriber Birth Date Member ID       VALE LIZARRAGA 1945 [id number]           Secondary Coverage     Payor Plan Insurance Group Employer/Plan Group    [employer]                Payor Plan Address Payor Plan Phone Number Payor Plan Fax Number Effective Dates    [address] [address]   11/1/2017 - None Entered    [facility] [id number]       Subscriber Name Subscriber Birth Date Member ID       VALE LIZARRAGA 1945 [id number]                 Emergency Contacts      (Rel.) Home Phone Work Phone Mobile Phone    CRISTHIAN LIZARRAGA (Son) [phone] -- [phone]    " Louis Lizarraga (Son) -- -- [phone]    Kishore Lizarraga (Son) -- -- [phone]            Emergency Contact Information     Name Relation Home Work Mobile    CRISTHIAN LIZARRAGA [phone]    Louis Lizarraga Son   [phone]    Kishore Lizarraga Son   [phone]          Insurance Information                MEDICARE/MEDICARE A & B Phone: [phone]    Subscriber: Vale Lizarraga Subscriber#: [id number]    Group#: -- Precert#: --        CIGNA[employer]           Phone: --    Subscriber: Vale Lizarraga EDWIGE Subscriber#: [id number]    Group#: -- Precert#: --             History & Physical      Halle Winn MD at 22 1545              [facility] Medicine Services  HISTORY AND PHYSICAL    Patient Name: Vale Lizarraga  : 1945  MRN: [id number]  Primary Care Physician: Sarah Redman, MARIA  Date of admission: 2022      Subjective   Subjective     Chief Complaint:  weakness    HPI:  Vale Lizarraga is a 76 y.o. female with history of diffuse large B-cell lymphoma on R-CHOP, recent Covid infection, PE who presents with weakness.  Patient was recently admitted to the hospital from 2022 to 2022 for severe bilateral Covid pneumonia and an acute PE.  After she is discharged the son noticed that she had no strength all of a sudden.  Patient states that she was weak while she was in the hospital but this worsened after discharge.  She is unable to walk by herself.  She was also complaining of shortness of breath.  She has been unable to shower.  She is also had continue forgetfulness and cannot finish her thoughts.  Son reports that she is had decreased p.o. intake.  After questioning, patient admits that she had bright red blood on her toilet paper but her son was unaware of this.  Patient states that she is tired and does not think things are getting better.  She denies any dysuria.  She denies any abdominal symptoms or nausea.  She denies any pain.  Son  reports that she has had weight loss.      COVID Details:    Symptoms:    [] NONE [] Fever []  Cough [x] Shortness of breath [] Change in taste/smell      Review of Systems   Constitutional: Positive for fatigue and unexpected weight change.   HENT: Negative for trouble swallowing.    Eyes: Negative for visual disturbance.   Respiratory: Positive for shortness of breath.    Cardiovascular: Negative for chest pain.   Gastrointestinal: Positive for blood in stool. Negative for abdominal pain.   Genitourinary: Negative for dysuria.   Musculoskeletal: Negative.    Skin: Negative for rash.   Neurological: Positive for weakness.          Personal History     Past Medical History:   Diagnosis Date   • Cancer (HCC)     Lymphoma   • CVA (cerebral vascular accident) (HCC)    • Fatigue    • Hypertension    • Weight loss, abnormal     30 lbs last 3 months       Past Surgical History:   Procedure Laterality Date   • CARDIAC SURGERY      loop recorder after stroke   • CATARACT EXTRACTION         Family History: family history includes COPD in her father; Cancer in her mother. Otherwise pertinent FHx was reviewed and unremarkable.     Social History:  reports that she has quit smoking. Her smoking use included cigarettes. She has a 15.00 pack-year smoking history. She has never used smokeless tobacco. She reports that she does not drink alcohol and does not use drugs.  Social History     Social History Narrative   • Not on file       Medications:  Available home medication information reviewed.  (Not in a hospital admission)      Allergies   Allergen Reactions   • Codeine Nausea And Vomiting   • Penicillins Swelling       Objective   Objective     Vital Signs:   Temp:  [98.9 °F (37.2 °C)] 98.9 °F (37.2 °C)  Heart Rate:  [] 91  Resp:  [16] 16  BP: (109-115)/(65-69) 109/68       Physical Exam   Constitutional: Thin, elderly malnourished female  HENT: NCAT, mucous membranes dry  Respiratory: Clear to auscultation bilaterally,  respiratory effort normal   Cardiovascular: RRR, no murmurs, rubs, or gallops  Gastrointestinal: Positive bowel sounds, soft, nontender, nondistended  Musculoskeletal: No bilateral ankle edema  Psychiatric: Not affect  Neurologic: Oriented x 3, no focal neurological deficits, moving all extremities  Skin: Port in place, clean dry and intact      Result Review:  I have personally reviewed the results from the time of this admission to 2/16/2022 15:57 EST and agree with these findings:  [x]  Laboratory  [x]  Microbiology  [x]  Radiology  [x]  EKG/Telemetry   []  Cardiology/Vascular   []  Pathology  [x]  Old records  []  Other:        LAB RESULTS:      Lab 02/16/22  1047 02/13/22  0931 02/11/22  1611   WBC 18.18* 8.63 6.29   HEMOGLOBIN 7.0* 9.2* 8.2*   HEMATOCRIT 21.2* 28.9* 24.5*   PLATELETS 199 222 175   NEUTROS ABS 15.62* 7.20* 5.36   IMMATURE GRANS (ABS) 1.06* 0.53* 0.31*   LYMPHS ABS 0.64* 0.41* 0.29*   MONOS ABS 0.82 0.45 0.32   EOS ABS 0.00 0.00 0.00   .0* 101.4* 97.6*   SED RATE  --   --  48*   CRP  --  1.34* 3.50*   PROCALCITONIN 0.11  --  0.09   LACTATE 2.4*  --  1.6   LDH  --   --  444*   PROTIME 23.7*  --  14.3   INR 2.21*  --  1.14         Lab 02/16/22  1047 02/13/22  0931 02/11/22  1611   SODIUM 140 141 141   POTASSIUM 3.8 3.9 3.5   CHLORIDE 107 107 107   CO2 22.0 24.0 25.0   ANION GAP 11.0 10.0 9.0   BUN 44* 27* 17   CREATININE 0.77 0.84 0.82   GLUCOSE 128* 110* 125*   CALCIUM 8.5* 8.7 8.5*   MAGNESIUM 2.2  --  2.0   TSH 1.010  --   --          Lab 02/16/22  1047 02/13/22  0931 02/11/22  1611   TOTAL PROTEIN 5.0* 5.5* 5.6*   ALBUMIN 2.90* 3.10* 2.80*   GLOBULIN 2.1 2.4 2.8   ALT (SGPT) 16 9 9   AST (SGOT) 20 13 14   BILIRUBIN 0.3 0.3 0.2   ALK PHOS 46 56 55         Lab 02/16/22  1047 02/11/22  1611   PROBNP 1,521.0 1,152.0   TROPONIN T 0.030 0.021             Lab 02/16/22  1311   ABO TYPING O   RH TYPING Positive   ANTIBODY SCREEN Negative         UA    Urinalysis 11/18/21 11/18/21 1/26/22 1/26/22  2/11/22 2/11/22    0924 0924 0723 0723 1818 1818   Squamous Epithelial Cells, UA  13-20 (A)  0-2  0-2   Specific Gravity, UA 1.018  >=1.030  1.027    Ketones, UA Trace (A)  Negative  Negative    Blood, UA Small (1+) (A)  Small (1+) (A)  Negative    Leukocytes, UA Negative  Negative  Negative    Nitrite, UA Negative  Negative  Negative    RBC, UA  3-6 (A)  None Seen  0-2   WBC, UA  6-12 (A)  0-2  0-2   Bacteria, UA  3+ (A)  Trace  None Seen   (A) Abnormal value              Microbiology Results (last 10 days)     Procedure Component Value - Date/Time    Blood Culture - Blood, Hand, Right [[id number]]  (Normal) Collected: 02/11/22 1640    Lab Status: Preliminary result Specimen: Blood from Hand, Right Updated: 02/15/22 1700     Blood Culture No growth at 4 days    Blood Culture - Blood, Chest, Left [[id number]]  (Normal) Collected: 02/11/22 1600    Lab Status: Preliminary result Specimen: Blood from Chest, Left Updated: 02/15/22 1700     Blood Culture No growth at 4 days          CT Head Without Contrast    Result Date: 2/16/2022  EXAMINATION: CT HEAD WO CONTRAST-  INDICATION: Generalized weakness  TECHNIQUE: Axial noncontrast CT of the head with multiplanar reconstruction  The radiation dose reduction device was turned on for each scan per the ALARA (As Low as Reasonably Achievable) protocol.  COMPARISON: 11/18/2021  FINDINGS: Gray-white differentiation is maintained and there is no evidence of intracranial hemorrhage, mass or mass effect. Age-related changes of the brain are present including volume loss and typical periventricular sequela of chronic small vessel ischemia. There is otherwise no evidence of intracranial hemorrhage, mass or mass effect. The ventricles are normal in size and configuration accounting for surrounding volume loss. The orbits are normal. There is fluid opacification including aerated secretions noted in the left maxillary sinus and left sphenoid chamber.      Impression: Age-related changes  of the brain as above, otherwise without evidence of acute intracranial abnormality.  Aerated secretions and mucosal thickening in the left sphenoid chamber and left maxillary sinus, consistent with acute sinusitis in the correct clinical setting.   This report was finalized on 2/16/2022 1:25 PM by Kavin Sandhu.      XR Chest 1 View    Result Date: 2/16/2022  DATE OF EXAM: 2/16/2022 10:35 AM  PROCEDURE: XR CHEST 1 VW-  INDICATIONS: SOA triage protocol  COMPARISON: CT chest February 11, 2022  TECHNIQUE: Single radiographic AP view of the chest was obtained.  FINDINGS: A port catheter is noted with its tip in the superior vena cava. A cardiac recording device is seen overlying the left chest. There are bibasilar densities which could relate to atelectasis or sequela of more recent inflammatory process. There are some vague interstitial changes at least more peripherally within the right lung. There are no definite pleural effusions. It looks like there is an element of COPD.      Impression: 1.  There are some peripheral vague densities within the right lung in addition bibasilar areas of density. This could reflect sequela to recent Covid pneumonia. Some atelectasis might also account for at least some of the appearance to the basilar areas.  This report was finalized on 2/16/2022 10:45 AM by Damaso Dutta MD.      CT Angiogram Chest    Result Date: 2/16/2022  EXAMINATION: CT ANGIOGRAM CHEST-  INDICATION: SOB  TECHNIQUE: Axial pulmonary arterial phase IV contrast enhanced CT angiogram of the chest per PE protocol. Two-dimensional reconstructions were postprocessed.  The radiation dose reduction device was turned on for each scan per the ALARA (As Low as Reasonably Achievable) protocol.  COMPARISON: 2/11/2022  FINDINGS: No pathologic axillary adenopathy or other worrisome body wall soft tissue findings in the chest. No acute findings in the partially imaged upper abdomen. No pleural or pericardial effusion. No  pathologic mediastinal or hilar lymphadenopathy. Nonaneurysmal mildly atherosclerotic thoracic aorta. Evaluation of the osseous structures demonstrates no evidence of acute fracture or aggressive osseous lesion, with redemonstrated spondylosis and exaggerated kyphosis. There is redemonstration of a filling defect within the branching left lower lobe segmental pulmonary artery branch, unchanged from recent comparison. No new or more extensive coronary emboli evident. Evaluation of the lung fields redemonstrates peripheral predominant areas of groundglass opacity and reticulation compatible with Covid 19 related pneumonia.      Impression: Unchanged small filling defect within the left lower lobe segmental branch compatible with small pulmonary embolus. There is no evidence of new or progressive pulmonary emboli. No evidence of right heart strain.  Redemonstrated findings of likely Covid 19 related pneumonia with extensive peripheral reticulation and groundglass opacity.   This report was finalized on 2/16/2022 1:28 PM by Kavin Sandhu.        Results for orders placed during the hospital encounter of 02/11/22    Adult Transthoracic Echo Complete w/ Color, Spectral and Contrast if necessary per protocol    Interpretation Summary  · Normal LV systolic function  · No significant valvular abnormality      Assessment/Plan   Assessment & Plan     Active Hospital Problems    Diagnosis  POA   • Symptomatic anemia [D64.9]  Yes   • BRBPR (bright red blood per rectum) [K62.5]  Unknown   • Lactic acidosis [E87.2]  Unknown   • Pulmonary embolism (HCC) [I26.99]  Yes   • 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) [C83.33]  Yes       Vale Cee is a 76 y.o. female with history of diffuse large B-cell lymphoma on R-CHOP, recent Covid infection, PE who presents with weakness.  Patient was recently admitted to the hospital from 2/11/2022 to 2/13/2022 for severe bilateral Covid pneumonia and an acute PE.      Failure to thrive  -Patient recently discharged from the hospital with worsening weakness, decreased p.o. intake.  -Start IV fluids, reassess for additional fluids in a.m.  -Long discussion with son about her functional status.  Discussed that her p.o. intake may not improve.  She has been on Megace but this is not even working now.  She is also weak.  I am concerned about her ability and also her want participate with therapy.  I did discuss that if her p.o. intake does not improve and her weakness not improved she may not be a candidate for chemotherapy and we may need to consider other options such as hospice.  He was understanding.  -Hold hydrochlorothiazide  -Continue Megace  -We will place a palliative care consult, discussed with son.  -We will place a consult for PT OT although I am concerned she would not want to participate.    Lactic acidosis  -Lactate elevated at 2.4  -Continue IV fluids  -Reflex pending    Leukocytosis  -Low suspicion for infection, procalcitonin normal.  Suspect this is likely secondary to steroids.    Bright red blood per rectum  -Patient with bright red blood per rectum  -Fecal occult positive  -Hemoglobin dropped from 9.2 on 2/13 to 7 2/16  -We will give her transfusion of PRBCs  -Monitor hemoglobin every 12 hours  -GI consult.    Diffuse large B-cell lymphoma  -Consult Dr. Al, unclear if the patient will be a candidate for further chemotherapy given her functional status currently    Recent Covid  -Patient was discharged with 5 days of dexamethasone.  Will continue with an additional 3 days of dexamethasone to complete her 10-day course.  -Currently on room air  -She can be removed from precautions 2/20/2022    DVT prophylaxis:  apixiban      CODE STATUS:  DNR/DNI  There are no questions and answers to display.         Halle Winn MD  02/16/22      Electronically signed by Halle Winn MD at 02/16/22 1611         Current Facility-Administered Medications    Medication Dose Route Frequency Provider Last Rate Last Admin   • acetaminophen (TYLENOL) tablet 650 mg  650 mg Oral Q4H PRN Halle Winn MD        Or   • acetaminophen (TYLENOL) 160 MG/5ML solution 650 mg  650 mg Oral Q4H PRN Halle Winn MD        Or   • acetaminophen (TYLENOL) suppository 650 mg  650 mg Rectal Q4H PRN Halle Winn MD       • albuterol sulfate HFA (PROVENTIL HFA;VENTOLIN HFA;PROAIR HFA) inhaler 2 puff  2 puff Inhalation 4x Daily - RT Halle Winn MD   2 puff at 02/18/22 0724   • allopurinol (ZYLOPRIM) tablet 300 mg  300 mg Oral Daily Halle Winn MD   300 mg at 02/18/22 0845   • aspirin chewable tablet 81 mg  81 mg Oral Daily Halle Winn MD   81 mg at 02/18/22 0845   • sennosides-docusate (PERICOLACE) 8.6-50 MG per tablet 2 tablet  2 tablet Oral BID Halle Winn MD   2 tablet at 02/18/22 0845    And   • polyethylene glycol (MIRALAX) packet 17 g  17 g Oral Daily PRN Halle Winn MD        And   • bisacodyl (DULCOLAX) EC tablet 5 mg  5 mg Oral Daily PRN Halle Winn MD        And   • bisacodyl (DULCOLAX) suppository 10 mg  10 mg Rectal Daily PRN Halle Winn MD       • dexamethasone (DECADRON) tablet 6 mg  6 mg Oral Daily With Breakfast Halle Winn MD   6 mg at 02/18/22 0845   • heparin injection 500 Units  5 mL Intravenous PRN Margaret Olson APRN       • LORazepam (ATIVAN) tablet 0.5 mg  0.5 mg Oral Q8H PRN Halle Winn MD       • melatonin tablet 5 mg  5 mg Oral Nightly PRN Halle Winn MD   5 mg at 02/17/22 2042   • metoprolol succinate XL (TOPROL-XL) 24 hr tablet 25 mg  25 mg Oral Daily Halle Winn MD   25 mg at 02/18/22 0845   • mirtazapine (REMERON) tablet 15 mg  15 mg Oral Nightly Halle Winn MD   15 mg at 02/17/22 2041   • ondansetron (ZOFRAN) tablet 4 mg  4 mg Oral Q6H PRN Halle Winn MD        Or   • ondansetron (ZOFRAN) injection 4 mg  4 mg Intravenous Q6H PRN Halle Winn MD        • rosuvastatin (CRESTOR) tablet 20 mg  20 mg Oral Nightly Halle Winn MD   20 mg at 22   • sodium chloride 0.9 % flush 10 mL  10 mL Intravenous PRN Halle Winn MD       • sodium chloride 0.9 % flush 10 mL  10 mL Intravenous Q12H Halle Winn MD   10 mL at 22 2100   • sodium chloride 0.9 % flush 10 mL  10 mL Intravenous PRN Halle Winn MD       • sodium chloride 0.9 % flush 10 mL  10 mL Intravenous Q12H Margaret Olson APRN   10 mL at 22 0845   • sodium chloride 0.9 % flush 10 mL  10 mL Intravenous PRN Margaret Olson APRN       • sodium chloride 0.9 % flush 20 mL  20 mL Intravenous PRN Margaret Olson APRN            Physician Progress Notes (last 72 hours)      Halle Cardoza PA at 22 1123        Discussed patient with Dr. Winn.  Family and patient have elected to pursue comfort measures.   Hospice has been consulted.    Will discontinue q4h H&H    We will sign off.  Please call for any questions or concerns    Electronically signed by Halle Cardoza PA at 22 1124     Halle Winn MD at 22 1500              [facility] Medicine Services  PROGRESS NOTE    Patient Name: Vale Cee  : 1945  MRN: [id number]    Date of Admission: 2022  Primary Care Physician: Sarah Redman APRN    Subjective   Subjective     CC:  weakness    HPI:  Patient is concerned that she isn't going to get better. She feels week in her legs. She has some BRBPR earlier today per nursing staff    ROS:  General: denies fevers or chills  CV: denies chest pain  Resp: denies shortness of breath  Abd: denies abd pain, nausea      Objective   Objective     Vital Signs:   Temp:  [98 °F (36.7 °C)-98.7 °F (37.1 °C)] 98.2 °F (36.8 °C)  Heart Rate:  [] 86  Resp:  [16-21] 20  BP: ()/(61-79) 125/72     Physical Exam:  Constitutional: No acute distress, resting in bed, thin and frail  Respiratory: Clear to auscultation  bilaterally, respiratory effort normal   Cardiovascular: RRR, no murmurs, rubs, or gallops  Gastrointestinal: Positive bowel sounds, soft, nontender, nondistended  Musculoskeletal: No bilateral ankle edema  Psychiatric: Appropriate affect, cooperative  Neurologic: Oriented x 3, no focal deficits      Results Reviewed:  LAB RESULTS:      Lab 02/17/22  1219 02/17/22  0742 02/17/22  0415 02/16/22 2024 02/16/22  1047 02/13/22  0931 02/11/22  1611 02/11/22  1611   WBC  --   --  10.91*  --  18.18* 8.63  --  6.29   HEMOGLOBIN 7.8* 7.6* 7.8*  --  7.0* 9.2*   < > 8.2*   HEMATOCRIT 23.0* 22.5* 23.6*  --  21.2* 28.9*   < > 24.5*   PLATELETS  --   --  156  --  199 222  --  175   NEUTROS ABS  --   --  9.15*  --  15.62* 7.20*  --  5.36   IMMATURE GRANS (ABS)  --   --  0.77*  --  1.06* 0.53*  --  0.31*   LYMPHS ABS  --   --  0.49*  --  0.64* 0.41*  --  0.29*   MONOS ABS  --   --  0.48  --  0.82 0.45  --  0.32   EOS ABS  --   --  0.00  --  0.00 0.00  --  0.00   MCV  --   --  97.9*  --  101.0* 101.4*  --  97.6*   SED RATE  --   --   --   --   --   --   --  48*   CRP  --   --   --   --   --  1.34*  --  3.50*   PROCALCITONIN  --   --   --   --  0.11  --   --  0.09   LACTATE  --   --   --  1.5 2.4*  --   --  1.6   LDH  --   --   --   --   --   --   --  444*   PROTIME  --   --  21.5*  --  23.7*  --   --  14.3   APTT  --   --  25.6  --   --   --   --   --    HEPARIN ANTI-XA  --  >1.10* >1.10*  --   --   --   --   --     < > = values in this interval not displayed.         Lab 02/17/22  0415 02/16/22  1047 02/13/22  0931 02/11/22  1611   SODIUM 140 140 141 141   POTASSIUM 4.1 3.8 3.9 3.5   CHLORIDE 108* 107 107 107   CO2 23.0 22.0 24.0 25.0   ANION GAP 9.0 11.0 10.0 9.0   BUN 42* 44* 27* 17   CREATININE 0.71 0.77 0.84 0.82   GLUCOSE 113* 128* 110* 125*   CALCIUM 8.1* 8.5* 8.7 8.5*   MAGNESIUM  --  2.2  --  2.0   TSH  --  1.010  --   --          Lab 02/16/22  1047 02/13/22  0931 02/11/22  1611   TOTAL PROTEIN 5.0* 5.5* 5.6*   ALBUMIN  2.90* 3.10* 2.80*   GLOBULIN 2.1 2.4 2.8   ALT (SGPT) 16 9 9   AST (SGOT) 20 13 14   BILIRUBIN 0.3 0.3 0.2   ALK PHOS 46 56 55         Lab 02/17/22  0415 02/16/22  1047 02/11/22  1611   PROBNP  --  1,521.0 1,152.0   TROPONIN T  --  0.030 0.021   PROTIME 21.5* 23.7* 14.3   INR 1.96* 2.21* 1.14             Lab 02/16/22  1311   ABO TYPING O   RH TYPING Positive   ANTIBODY SCREEN Negative         Brief Urine Lab Results  (Last result in the past 365 days)      Color   Clarity   Blood   Leuk Est   Nitrite   Protein   CREAT   Urine HCG        02/17/22 0400 Yellow   Cloudy   Small (1+)   Trace   Negative   100 mg/dL (2+)                 Microbiology Results Abnormal     Procedure Component Value - Date/Time    Blood Culture - Blood, Arm, Right [[id number]]  (Normal) Collected: 02/16/22 1230    Lab Status: Preliminary result Specimen: Blood from Arm, Right Updated: 02/17/22 1315     Blood Culture No growth at 24 hours    Blood Culture - Blood, Arm, Left [[id number]]  (Normal) Collected: 02/16/22 1200    Lab Status: Preliminary result Specimen: Blood from Arm, Left Updated: 02/17/22 1315     Blood Culture No growth at 24 hours          CT Head Without Contrast    Result Date: 2/16/2022  EXAMINATION: CT HEAD WO CONTRAST-  INDICATION: Generalized weakness  TECHNIQUE: Axial noncontrast CT of the head with multiplanar reconstruction  The radiation dose reduction device was turned on for each scan per the ALARA (As Low as Reasonably Achievable) protocol.  COMPARISON: 11/18/2021  FINDINGS: Gray-white differentiation is maintained and there is no evidence of intracranial hemorrhage, mass or mass effect. Age-related changes of the brain are present including volume loss and typical periventricular sequela of chronic small vessel ischemia. There is otherwise no evidence of intracranial hemorrhage, mass or mass effect. The ventricles are normal in size and configuration accounting for surrounding volume loss. The orbits are normal.  There is fluid opacification including aerated secretions noted in the left maxillary sinus and left sphenoid chamber.      Impression: Age-related changes of the brain as above, otherwise without evidence of acute intracranial abnormality.  Aerated secretions and mucosal thickening in the left sphenoid chamber and left maxillary sinus, consistent with acute sinusitis in the correct clinical setting.   This report was finalized on 2/16/2022 1:25 PM by Kavin Sandhu.      XR Chest 1 View    Result Date: 2/16/2022  DATE OF EXAM: 2/16/2022 10:35 AM  PROCEDURE: XR CHEST 1 VW-  INDICATIONS: SOA triage protocol  COMPARISON: CT chest February 11, 2022  TECHNIQUE: Single radiographic AP view of the chest was obtained.  FINDINGS: A port catheter is noted with its tip in the superior vena cava. A cardiac recording device is seen overlying the left chest. There are bibasilar densities which could relate to atelectasis or sequela of more recent inflammatory process. There are some vague interstitial changes at least more peripherally within the right lung. There are no definite pleural effusions. It looks like there is an element of COPD.      Impression: 1.  There are some peripheral vague densities within the right lung in addition bibasilar areas of density. This could reflect sequela to recent Covid pneumonia. Some atelectasis might also account for at least some of the appearance to the basilar areas.  This report was finalized on 2/16/2022 10:45 AM by Damaso Dutta MD.      CT Angiogram Chest    Result Date: 2/16/2022  EXAMINATION: CT ANGIOGRAM CHEST-  INDICATION: SOB  TECHNIQUE: Axial pulmonary arterial phase IV contrast enhanced CT angiogram of the chest per PE protocol. Two-dimensional reconstructions were postprocessed.  The radiation dose reduction device was turned on for each scan per the ALARA (As Low as Reasonably Achievable) protocol.  COMPARISON: 2/11/2022  FINDINGS: No pathologic axillary adenopathy or other  worrisome body wall soft tissue findings in the chest. No acute findings in the partially imaged upper abdomen. No pleural or pericardial effusion. No pathologic mediastinal or hilar lymphadenopathy. Nonaneurysmal mildly atherosclerotic thoracic aorta. Evaluation of the osseous structures demonstrates no evidence of acute fracture or aggressive osseous lesion, with redemonstrated spondylosis and exaggerated kyphosis. There is redemonstration of a filling defect within the branching left lower lobe segmental pulmonary artery branch, unchanged from recent comparison. No new or more extensive coronary emboli evident. Evaluation of the lung fields redemonstrates peripheral predominant areas of groundglass opacity and reticulation compatible with Covid 19 related pneumonia.      Impression: Unchanged small filling defect within the left lower lobe segmental branch compatible with small pulmonary embolus. There is no evidence of new or progressive pulmonary emboli. No evidence of right heart strain.  Redemonstrated findings of likely Covid 19 related pneumonia with extensive peripheral reticulation and groundglass opacity.   This report was finalized on 2/16/2022 1:28 PM by Kavin Sandhu.        Results for orders placed during the hospital encounter of 02/11/22    Adult Transthoracic Echo Complete w/ Color, Spectral and Contrast if necessary per protocol    Interpretation Summary  · Normal LV systolic function  · No significant valvular abnormality      I have reviewed the medications:  Scheduled Meds:!NOT ORDERED- apixaban (ELIQUIS), , Does not apply, Daily With Dinner  albuterol sulfate HFA, 2 puff, Inhalation, 4x Daily - RT  allopurinol, 300 mg, Oral, Daily  aspirin, 81 mg, Oral, Daily  dexamethasone, 6 mg, Oral, Daily With Breakfast  metoprolol succinate XL, 25 mg, Oral, Daily  mirtazapine, 15 mg, Oral, Nightly  PEG-KCl-NaCl-NaSulf-Na Asc-C, 1,000 mL, Oral, Once When Specified   Followed by  [START ON 2/18/2022]  PEG-KCl-NaCl-NaSulf-Na Asc-C, 1,000 mL, Oral, Once When Specified  rosuvastatin, 20 mg, Oral, Nightly  senna-docusate sodium, 2 tablet, Oral, BID  sodium chloride, 10 mL, Intravenous, Q12H  sodium chloride, 10 mL, Intravenous, Q12H      Continuous Infusions:lactated ringers, 75 mL/hr, Last Rate: 75 mL/hr (02/17/22 1040)      PRN Meds:.acetaminophen **OR** acetaminophen **OR** acetaminophen  •  senna-docusate sodium **AND** polyethylene glycol **AND** bisacodyl **AND** bisacodyl  •  heparin  •  LORazepam  •  melatonin  •  ondansetron **OR** ondansetron  •  sodium chloride  •  sodium chloride  •  sodium chloride  •  sodium chloride    Assessment/Plan   Assessment & Plan     Active Hospital Problems    Diagnosis  POA   • Pneumonia due to COVID-19 virus [U07.1, J12.82]  Yes   • Symptomatic anemia [D64.9]  Yes   • BRBPR (bright red blood per rectum) [K62.5]  Unknown   • Lactic acidosis [E87.2]  Unknown   • Pulmonary embolism (HCC) [I26.99]  Yes   • 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) [C83.33]  Yes      Resolved Hospital Problems   No resolved problems to display.        Brief Hospital Course to date:  Vale Cee is a 76 y.o. female with history of diffuse large B-cell lymphoma on R-CHOP, recent Covid infection, PE who presents with weakness.  Patient was recently admitted to the hospital from 2/11/2022 to 2/13/2022 for severe bilateral Covid pneumonia and an acute PE.      Failure to thrive  -Patient recently discharged from the hospital with worsening weakness, decreased p.o. intake.  -Continue IVF  -Hold hydrochlorothiazide  -Continue Megace  -Palliative following  -PT/OT    Bright red blood per rectum  -Patient with bright red blood per rectum  -On anticoagulation for a PE.  Unfortunately we need to hold this in the setting of active bleeding  -Fecal occult positive  -Hemoglobin dropped from 9.2 on 2/13 to 7 2/16  -Received 1 unit PRBCs on 2/6/2022.  -We will give an additional unit of  PRBCs this morning.  -Monitor hemoglobin every 12 hours  -GI consult.    PE  -Patient on anticoagulation for PE, holding this in the setting of bleeding     Leukocytosis  -Low suspicion for infection, procalcitonin normal.  Suspect this is likely secondary to steroids.     Diffuse large B-cell lymphoma  -Consult Dr. Al, unclear if the patient will be a candidate for further chemotherapy given her functional status currently     Recent Covid  -Patient was discharged with 5 days of dexamethasone.  Will continue with an additional 3 days of dexamethasone to complete her 10-day course.  -Currently on room air  -She can be removed from precautions 2/20/2022     DVT prophylaxis:  SCDs, holding apixban in the setting of bleeding    Called and discussed with son, Louis and Nathan on different phone calls to discuss the situation. We discussed her poor functional status. Family is not want her to suffer. I brought up hospice. I also discussed with palliative care.    DVT prophylaxis:  Medical DVT prophylaxis orders are present.       AM-PAC 6 Clicks Score (PT): 16 (02/17/22 4862)    Disposition: I expect the patient to be discharged TBD.    CODE STATUS:   Code Status and Medical Interventions:   Ordered at: 02/17/22 1059     Medical Intervention Limits:    NO intubation (DNI)    NO artificial nutrition    NO dialysis     Level Of Support Discussed With:    Patient     Code Status (Patient has no pulse and is not breathing):    No CPR (Do Not Attempt to Resuscitate)     Medical Interventions (Patient has pulse or is breathing):    Limited Support     I have prepared this progress note with copied portions of the prior day's progress note of my own authorship to preserve accuracy and maintain consistency of documentation. I have reviewed these portions and edited them for correctness. I verify that the above documentation accurately and truly represents the evaluation and management performed on today's date.     More than  50% of time spent on coordination of care with nursing staff/case management/specialists as well as counseling patient/family on current illness/plan of care. Case discussed with: Both sons, patient, palliative care  Total time of the encounter was 60 minutes.        Halle Winn MD  22                Electronically signed by Halle Winn MD at 22 1531     Halle Winn MD at 22 1133              [facility]  PROGRESS NOTE    Patient Name: Vale Cee  : 1945  MRN: [id number]    Date of Admission: 2022  Primary Care Physician: Sarah Redman APRN    Subjective   Subjective     CC:  weakness    HPI:  Patient is concerned that she isn't going to get better. She feels week in her legs. She has some BRBPR earlier today per nursing staff    ROS:  General: denies fevers or chills  CV: denies chest pain  Resp: denies shortness of breath  Abd: denies abd pain, nausea      Objective   Objective     Vital Signs:   Temp:  [98 °F (36.7 °C)-98.7 °F (37.1 °C)] 98.7 °F (37.1 °C)  Heart Rate:  [] 83  Resp:  [16-21] 18  BP: ()/(61-79) 111/69     Physical Exam:  Constitutional: No acute distress, resting in bed, thin and frail  Respiratory: Clear to auscultation bilaterally, respiratory effort normal   Cardiovascular: RRR, no murmurs, rubs, or gallops  Gastrointestinal: Positive bowel sounds, soft, nontender, nondistended  Musculoskeletal: No bilateral ankle edema  Psychiatric: Appropriate affect, cooperative  Neurologic: Oriented x 3, no focal deficits      Results Reviewed:  LAB RESULTS:      Lab 22  0742 22  0415 22  1047 22  0931 22  1611   WBC  --  10.91*  --  18.18* 8.63 6.29   HEMOGLOBIN 7.6* 7.8*  --  7.0* 9.2* 8.2*   HEMATOCRIT 22.5* 23.6*  --  21.2* 28.9* 24.5*   PLATELETS  --  156  --  199 222 175   NEUTROS ABS  --  9.15*  --  15.62* 7.20* 5.36   IMMATURE GRANS (ABS)  --   0.77*  --  1.06* 0.53* 0.31*   LYMPHS ABS  --  0.49*  --  0.64* 0.41* 0.29*   MONOS ABS  --  0.48  --  0.82 0.45 0.32   EOS ABS  --  0.00  --  0.00 0.00 0.00   MCV  --  97.9*  --  101.0* 101.4* 97.6*   SED RATE  --   --   --   --   --  48*   CRP  --   --   --   --  1.34* 3.50*   PROCALCITONIN  --   --   --  0.11  --  0.09   LACTATE  --   --  1.5 2.4*  --  1.6   LDH  --   --   --   --   --  444*   PROTIME  --  21.5*  --  23.7*  --  14.3   APTT  --  25.6  --   --   --   --    HEPARIN ANTI-XA >1.10* >1.10*  --   --   --   --          Lab 02/17/22 0415 02/16/22 1047 02/13/22 0931 02/11/22  1611   SODIUM 140 140 141 141   POTASSIUM 4.1 3.8 3.9 3.5   CHLORIDE 108* 107 107 107   CO2 23.0 22.0 24.0 25.0   ANION GAP 9.0 11.0 10.0 9.0   BUN 42* 44* 27* 17   CREATININE 0.71 0.77 0.84 0.82   GLUCOSE 113* 128* 110* 125*   CALCIUM 8.1* 8.5* 8.7 8.5*   MAGNESIUM  --  2.2  --  2.0   TSH  --  1.010  --   --          Lab 02/16/22 1047 02/13/22 0931 02/11/22  1611   TOTAL PROTEIN 5.0* 5.5* 5.6*   ALBUMIN 2.90* 3.10* 2.80*   GLOBULIN 2.1 2.4 2.8   ALT (SGPT) 16 9 9   AST (SGOT) 20 13 14   BILIRUBIN 0.3 0.3 0.2   ALK PHOS 46 56 55         Lab 02/17/22 0415 02/16/22 1047 02/11/22  1611   PROBNP  --  1,521.0 1,152.0   TROPONIN T  --  0.030 0.021   PROTIME 21.5* 23.7* 14.3   INR 1.96* 2.21* 1.14             Lab 02/16/22  1311   ABO TYPING O   RH TYPING Positive   ANTIBODY SCREEN Negative         Brief Urine Lab Results  (Last result in the past 365 days)      Color   Clarity   Blood   Leuk Est   Nitrite   Protein   CREAT   Urine HCG        02/17/22 0400 Yellow   Cloudy   Small (1+)   Trace   Negative   100 mg/dL (2+)                 Microbiology Results Abnormal     None          CT Head Without Contrast    Result Date: 2/16/2022  EXAMINATION: CT HEAD WO CONTRAST-  INDICATION: Generalized weakness  TECHNIQUE: Axial noncontrast CT of the head with multiplanar reconstruction  The radiation dose reduction device was turned on for each  scan per the ALARA (As Low as Reasonably Achievable) protocol.  COMPARISON: 11/18/2021  FINDINGS: Gray-white differentiation is maintained and there is no evidence of intracranial hemorrhage, mass or mass effect. Age-related changes of the brain are present including volume loss and typical periventricular sequela of chronic small vessel ischemia. There is otherwise no evidence of intracranial hemorrhage, mass or mass effect. The ventricles are normal in size and configuration accounting for surrounding volume loss. The orbits are normal. There is fluid opacification including aerated secretions noted in the left maxillary sinus and left sphenoid chamber.      Impression: Age-related changes of the brain as above, otherwise without evidence of acute intracranial abnormality.  Aerated secretions and mucosal thickening in the left sphenoid chamber and left maxillary sinus, consistent with acute sinusitis in the correct clinical setting.   This report was finalized on 2/16/2022 1:25 PM by Kavin Sandhu.      XR Chest 1 View    Result Date: 2/16/2022  DATE OF EXAM: 2/16/2022 10:35 AM  PROCEDURE: XR CHEST 1 VW-  INDICATIONS: SOA triage protocol  COMPARISON: CT chest February 11, 2022  TECHNIQUE: Single radiographic AP view of the chest was obtained.  FINDINGS: A port catheter is noted with its tip in the superior vena cava. A cardiac recording device is seen overlying the left chest. There are bibasilar densities which could relate to atelectasis or sequela of more recent inflammatory process. There are some vague interstitial changes at least more peripherally within the right lung. There are no definite pleural effusions. It looks like there is an element of COPD.      Impression: 1.  There are some peripheral vague densities within the right lung in addition bibasilar areas of density. This could reflect sequela to recent Covid pneumonia. Some atelectasis might also account for at least some of the appearance to  the basilar areas.  This report was finalized on 2/16/2022 10:45 AM by Damaso Dutta MD.      CT Angiogram Chest    Result Date: 2/16/2022  EXAMINATION: CT ANGIOGRAM CHEST-  INDICATION: SOB  TECHNIQUE: Axial pulmonary arterial phase IV contrast enhanced CT angiogram of the chest per PE protocol. Two-dimensional reconstructions were postprocessed.  The radiation dose reduction device was turned on for each scan per the ALARA (As Low as Reasonably Achievable) protocol.  COMPARISON: 2/11/2022  FINDINGS: No pathologic axillary adenopathy or other worrisome body wall soft tissue findings in the chest. No acute findings in the partially imaged upper abdomen. No pleural or pericardial effusion. No pathologic mediastinal or hilar lymphadenopathy. Nonaneurysmal mildly atherosclerotic thoracic aorta. Evaluation of the osseous structures demonstrates no evidence of acute fracture or aggressive osseous lesion, with redemonstrated spondylosis and exaggerated kyphosis. There is redemonstration of a filling defect within the branching left lower lobe segmental pulmonary artery branch, unchanged from recent comparison. No new or more extensive coronary emboli evident. Evaluation of the lung fields redemonstrates peripheral predominant areas of groundglass opacity and reticulation compatible with Covid 19 related pneumonia.      Impression: Unchanged small filling defect within the left lower lobe segmental branch compatible with small pulmonary embolus. There is no evidence of new or progressive pulmonary emboli. No evidence of right heart strain.  Redemonstrated findings of likely Covid 19 related pneumonia with extensive peripheral reticulation and groundglass opacity.   This report was finalized on 2/16/2022 1:28 PM by Kavin Sandhu.        Results for orders placed during the hospital encounter of 02/11/22    Adult Transthoracic Echo Complete w/ Color, Spectral and Contrast if necessary per protocol    Interpretation  Summary  · Normal LV systolic function  · No significant valvular abnormality      I have reviewed the medications:  Scheduled Meds:!NOT ORDERED- apixaban (ELIQUIS), , Does not apply, Daily With Dinner  albuterol sulfate HFA, 2 puff, Inhalation, 4x Daily - RT  allopurinol, 300 mg, Oral, Daily  aspirin, 81 mg, Oral, Daily  dexamethasone, 6 mg, Oral, Daily With Breakfast  metoprolol succinate XL, 25 mg, Oral, Daily  mirtazapine, 15 mg, Oral, Nightly  rosuvastatin, 20 mg, Oral, Nightly  senna-docusate sodium, 2 tablet, Oral, BID  sodium chloride, 10 mL, Intravenous, Q12H  sodium chloride, 10 mL, Intravenous, Q12H      Continuous Infusions:Pharmacy Consult,   lactated ringers, 75 mL/hr, Last Rate: 75 mL/hr (02/17/22 1040)  Pharmacy to Dose Heparin,       PRN Meds:.•  Pharmacy Consult  •  acetaminophen **OR** acetaminophen **OR** acetaminophen  •  senna-docusate sodium **AND** polyethylene glycol **AND** bisacodyl **AND** bisacodyl  •  heparin  •  LORazepam  •  melatonin  •  ondansetron **OR** ondansetron  •  Pharmacy to Dose Heparin  •  sodium chloride  •  sodium chloride  •  sodium chloride  •  sodium chloride    Assessment/Plan   Assessment & Plan     Active Hospital Problems    Diagnosis  POA   • Symptomatic anemia [D64.9]  Yes   • BRBPR (bright red blood per rectum) [K62.5]  Unknown   • Lactic acidosis [E87.2]  Unknown   • Pulmonary embolism (HCC) [I26.99]  Yes   • 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) [C83.33]  Yes      Resolved Hospital Problems   No resolved problems to display.        Brief Hospital Course to date:  Vale Cee is a 76 y.o. female with history of diffuse large B-cell lymphoma on R-CHOP, recent Covid infection, PE who presents with weakness.  Patient was recently admitted to the hospital from 2/11/2022 to 2/13/2022 for severe bilateral Covid pneumonia and an acute PE.      Failure to thrive  -Patient recently discharged from the hospital with worsening weakness,  decreased p.o. intake.  -Continue IVF  -Hold hydrochlorothiazide  -Continue Megace  -Palliative following  -PT/OT    Bright red blood per rectum  -Patient with bright red blood per rectum  -On anticoagulation for a PE.  Unfortunately we need to hold this in the setting of active bleeding  -Fecal occult positive  -Hemoglobin dropped from 9.2 on 2/13 to 7 2/16  -Received 1 unit PRBCs on 2/6/2022.  -We will give an additional unit of PRBCs this morning.  -Monitor hemoglobin every 12 hours  -GI consult.    PE  -Patient on anticoagulation for PE, holding this in the setting of bleeding     Leukocytosis  -Low suspicion for infection, procalcitonin normal.  Suspect this is likely secondary to steroids.     Diffuse large B-cell lymphoma  -Consult Dr. Al, unclear if the patient will be a candidate for further chemotherapy given her functional status currently     Recent Covid  -Patient was discharged with 5 days of dexamethasone.  Will continue with an additional 3 days of dexamethasone to complete her 10-day course.  -Currently on room air  -She can be removed from precautions 2/20/2022     DVT prophylaxis:  SCDs, holding apixban in the setting of bleeding    DVT prophylaxis:  Medical DVT prophylaxis orders are present.       AM-PAC 6 Clicks Score (PT): 16 (02/17/22 0948)    Disposition: I expect the patient to be discharged TBD.    CODE STATUS:   Code Status and Medical Interventions:   Ordered at: 02/17/22 1059     Medical Intervention Limits:    NO intubation (DNI)    NO artificial nutrition    NO dialysis     Level Of Support Discussed With:    Patient     Code Status (Patient has no pulse and is not breathing):    No CPR (Do Not Attempt to Resuscitate)     Medical Interventions (Patient has pulse or is breathing):    Limited Support     I have prepared this progress note with copied portions of the prior day's progress note of my own authorship to preserve accuracy and maintain consistency of documentation. I have  reviewed these portions and edited them for correctness. I verify that the above documentation accurately and truly represents the evaluation and management performed on today's date.       Halle Winn MD  02/17/22                Electronically signed by Halle Winn MD at 02/17/22 1327          Consult Notes (last 72 hours)      Halle Cardoza PA at 02/17/22 1236      Consult Orders    1. Inpatient Gastroenterology Consult [[id number]] ordered by Halle Winn MD at 02/16/22 1611          Attestation signed by Aric Rivera MD at 02/17/22 1607    I have reviewed this documentation and agree.                    [facility] [place] [facility]    Referring Provider: Halle Winn MD   PCP: Sarah Redman APRN    Reason for Consultation: Symptomatic anemia     Chief complaint: Weakness     History of present illness:    Vale Cee is a 76 y.o. female who is admitted with symptomatic anemia.  She has diffuse large B-cell lymphoma, previously on chemotherapy, recent severe COVID-19 pneumonia and pulmonary embolism on anticoagulation with Apixiban.    She was recently discharged from our facility four days ago but has done poorly at home due to significant weakness.      H&H at discharge was 9.2 and 28.   H&H on arrival yesterday was 7.0 and 21.   MCV is 101.   Her stool was hemoccult positive.     She reports bright red blood in her stools for the last few days.   She had witnessed bright red blood per rectum today by her primary nurse.    Her last colonoscopy was approximately six years ago and she states she had 6 polyps removed.   She denies any recent change in bowel habits, constipation nor straining.       Allergies:  Codeine and Penicillins    Scheduled Meds:  !NOT ORDERED- apixaban (ELIQUIS), , Does not apply, Daily With Dinner  albuterol sulfate HFA, 2 puff, Inhalation, 4x Daily - RT  allopurinol, 300 mg, Oral, Daily  aspirin, 81 mg, Oral, Daily  dexamethasone, 6 mg, Oral, Daily  With Breakfast  metoprolol succinate XL, 25 mg, Oral, Daily  mirtazapine, 15 mg, Oral, Nightly  rosuvastatin, 20 mg, Oral, Nightly  senna-docusate sodium, 2 tablet, Oral, BID  sodium chloride, 10 mL, Intravenous, Q12H  sodium chloride, 10 mL, Intravenous, Q12H         Infusions:  Pharmacy Consult,   lactated ringers, 75 mL/hr, Last Rate: 75 mL/hr (02/17/22 1040)  Pharmacy to Dose Heparin,         PRN Meds:  •  Pharmacy Consult  •  acetaminophen **OR** acetaminophen **OR** acetaminophen  •  senna-docusate sodium **AND** polyethylene glycol **AND** bisacodyl **AND** bisacodyl  •  heparin  •  LORazepam  •  melatonin  •  ondansetron **OR** ondansetron  •  Pharmacy to Dose Heparin  •  sodium chloride  •  sodium chloride  •  sodium chloride  •  sodium chloride    Home Meds:  Medications Prior to Admission   Medication Sig Dispense Refill Last Dose   • acetaminophen (TYLENOL) 500 MG tablet Take 1 tablet by mouth Every 6 (Six) Hours As Needed for Mild Pain  or Moderate Pain .      • albuterol sulfate  (90 Base) MCG/ACT inhaler Inhale 2 puffs 4 (Four) Times a Day. 18 g 0    • allopurinol (Zyloprim) 300 MG tablet Take 1 tablet by mouth Daily. 30 tablet 1    • Apixaban Starter Pack (Eliquis DVT/PE Starter Pack) tablet therapy pack Take two 5 mg tablets by mouth every 12 hours for 7 days. Followed by one 5 mg tablet every 12 hours. (Dispense starter pack if available) 74 tablet 5    • Apremilast (OTEZLA PO) 30 mg.      • aspirin (Aspirin 81) 81 MG chewable tablet Aspir-81      • dexamethasone (DECADRON) 6 MG tablet Take 1 tablet by mouth Daily With Breakfast for 7 doses. 7 tablet 0    • lidocaine-prilocaine (EMLA) 2.5-2.5 % cream Apply 1 application topically to the appropriate area as directed As Needed (45-60 minutes prior to port access.  Cover with saran/plastic wrap.). 30 g 3    • megestrol (MEGACE) 40 MG/ML suspension TAKE 20 ML BY MOUTH  ONCE DAILY 480 mL 0    • metoprolol succinate XL (TOPROL-XL) 25 MG 24 hr  tablet Take 1 tablet by mouth Daily.      • Metoprolol-hydroCHLOROthiazide (DUTOPROL PO) metoprolol rooney-hydrochlorothiaz      • mirtazapine (REMERON) 15 MG tablet Take 1 tablet by mouth Every Night. 30 tablet 1    • nitrofurantoin, macrocrystal-monohydrate, (MACROBID) 100 MG capsule       • nystatin (MYCOSTATIN) 100,000 unit/mL suspension Swish and swallow 5 mL 4 (Four) Times a Day. 200 mL 0    • ondansetron (ZOFRAN) 8 MG tablet Take 1 tablet by mouth 3 (Three) Times a Day As Needed for Nausea or Vomiting. 30 tablet 5    • ondansetron ODT (Zofran ODT) 4 MG disintegrating tablet Place 1 tablet on the tongue Every 8 (Eight) Hours As Needed for Nausea or Vomiting. 30 tablet 0    • potassium chloride (K-DUR,KLOR-CON) 20 MEQ CR tablet Take 1 tablet by mouth Daily. 7 tablet 0    • potassium chloride 10 MEQ CR tablet       • predniSONE (DELTASONE) 20 MG tablet Take 60 mg daily for 2 days with food.  Taper by 20 mg every 2 days until complete 12 tablet 0    • rosuvastatin (CRESTOR) 20 MG tablet Take 20 mg by mouth.          ROS: Review of Systems   Constitutional: Positive for fatigue.   HENT: Negative.    Eyes: Negative.    Respiratory: Positive for shortness of breath.    Cardiovascular: Negative.    Gastrointestinal: Positive for blood in stool. Negative for abdominal pain, constipation, nausea and vomiting.   Endocrine: Negative.    Genitourinary: Negative.    Musculoskeletal: Negative.    Skin: Positive for pallor.   Allergic/Immunologic: Negative.    Neurological: Positive for weakness.   Hematological: Negative.    Psychiatric/Behavioral: Negative.        PAST MED HX:  Past Medical History:   Diagnosis Date   • Cancer (HCC)     Lymphoma   • CVA (cerebral vascular accident) (HCC)    • Fatigue    • Hypertension    • Weight loss, abnormal     30 lbs last 3 months       PAST SURG HX:  Past Surgical History:   Procedure Laterality Date   • CARDIAC SURGERY      loop recorder after stroke   • CATARACT EXTRACTION         FAM  "HX:  Family History   Problem Relation Age of Onset   • Cancer Mother    • COPD Father        SOC HX:  Social History     Socioeconomic History   • Marital status:    Tobacco Use   • Smoking status: Former Smoker     Packs/day: 0.50     Years: 30.00     Pack years: 15.00     Types: Cigarettes   • Smokeless tobacco: Never Used   • Tobacco comment: quit 2 months ago   Vaping Use   • Vaping Use: Never used   Substance and Sexual Activity   • Alcohol use: Never   • Drug use: Never   • Sexual activity: Defer       PHYSICAL EXAM  /70 (BP Location: Right arm, Patient Position: Lying)   Pulse 86   Temp 98.5 °F (36.9 °C) (Oral)   Resp 18   Ht 165.1 cm (65\")   Wt 57.9 kg (127 lb 11.2 oz)   SpO2 96%   BMI 21.25 kg/m²   Wt Readings from Last 3 Encounters:   02/17/22 57.9 kg (127 lb 11.2 oz)   02/14/22 58.2 kg (128 lb 5 oz)   02/12/22 58.2 kg (128 lb 4 oz)   ,body mass index is 21.25 kg/m².  Physical Exam  Constitutional:       General: She is not in acute distress.     Comments: Thin female resting supine.   No acute distress.  Appears her stated age or slightly younger.   Pleasant to speak with.     HENT:      Head: Normocephalic and atraumatic.      Mouth/Throat:      Mouth: Mucous membranes are moist.   Eyes:      General: No scleral icterus.  Cardiovascular:      Rate and Rhythm: Normal rate and regular rhythm.   Pulmonary:      Effort: Pulmonary effort is normal.      Breath sounds: Normal breath sounds.   Abdominal:      General: Bowel sounds are normal. There is no distension.      Palpations: Abdomen is soft.      Tenderness: There is no abdominal tenderness.   Musculoskeletal:      Right lower leg: No edema.      Left lower leg: No edema.   Skin:     Coloration: Skin is pale.   Neurological:      Mental Status: She is alert and oriented to person, place, and time.   Psychiatric:         Behavior: Behavior normal.       Results Review:   I reviewed the patient's new clinical results.    Lab Results "   Component Value Date    WBC 10.91 (H) 02/17/2022    HGB 7.6 (L) 02/17/2022    HGB 7.8 (L) 02/17/2022    HGB 7.0 (L) 02/16/2022    HCT 22.5 (L) 02/17/2022    MCV 97.9 (H) 02/17/2022     02/17/2022       Lab Results   Component Value Date    INR 1.96 (H) 02/17/2022    INR 2.21 (H) 02/16/2022    INR 1.14 02/11/2022       Lab Results   Component Value Date    GLUCOSE 113 (H) 02/17/2022    BUN 42 (H) 02/17/2022    CREATININE 0.71 02/17/2022    EGFRIFNONA 80 02/17/2022    BCR 59.2 (H) 02/17/2022     02/17/2022    K 4.1 02/17/2022    CO2 23.0 02/17/2022    CALCIUM 8.1 (L) 02/17/2022    ALBUMIN 2.90 (L) 02/16/2022    ALKPHOS 46 02/16/2022    BILITOT 0.3 02/16/2022    ALT 16 02/16/2022    AST 20 02/16/2022       ASSESSMENTS/PLANS    1. Bright red blood per rectum  2. Symptomatic anemia   3. History of colon polyps  4. Anticoagulation use, on Eliquis for pulmonary embolism   5. Diffuse large B-cell lymphoma     Ms. Cee is a pleasant 76 year old female with B-cell lymphoma, recent hospitalization for COVID-19 pneumonia and pulmonary embolism presents with symptomatic anemia and bright red blood per rectum on Eliquis.  Her last colonoscopy was approximately 6 years ago and she states she had 6 polyps removed at that time.   Suspect bleeding is secondary to hemorrhoidal source, bleeding polyp or diverticulosis.       >>> Recommend Colonoscopy tomorrow, 2/18/2022  >>> Split dose Moviprep to begin this evening   >>> Clear liquid diet today and NPO at midnight tonight     I discussed the patient's findings and my recommendations with patient    KADE Guardado  02/17/22  12:37 EST      Electronically signed by Aric Rivera MD at 02/17/22 4257     Britany Ball NP at 02/17/22 1058      Consult Orders    1. Inpatient Palliative Care MD Consult [[id number]] ordered by Halle Winn MD at 02/16/22 1611               Palliative Care Initial Consultation Note    Name: Vale GIBBONS Coleman ,Age: 76 y.o. years old,  Sex: female  Admit Date:  2/16/2022    Sarah Redman, MARIA  Consulting physician: Halle Winn MD  Reason for referral: [facility]  Chief Complaint   Patient presents with   • Fatigue   • Shortness of Breath       HPI: 76 y.o. female with history of diffuse large B-cell lymphoma on R-CHOP, recent Covid infection, PE who presents with weakness.  Patient was recently admitted to the hospital from 2/11/2022 to 2/13/2022 for severe bilateral Covid pneumonia and an acute PE.  Weakness has worsened since being at home, unable to walk by herself so returned to the ER on 2/16. Noted to be anemic with Hgb of 7, has not been eating well, blood in stools noted. She reports no dyspnea at rest but does get winded with exertion. Sleeping fine. No anxiety/depression. Appetite has been poor (currently NPO). Denies pain.     Symptoms:   TABOR  debility    Advance care planning discussed: yes    Code Status:   Current Code Status     Date Active Code Status Order ID Comments User Context       2/16/2022 1611 No CPR (Do Not Attempt to Resuscitate) [id number]  Halle Winn MD ED     Advance Care Planning Activity      Questions for Current Code Status     Question Answer    Code Status (Patient has no pulse and is not breathing) No CPR (Do Not Attempt to Resuscitate)    Medical Interventions (Patient has pulse or is breathing) Limited Support    Medical Intervention Limits: NO intubation (DNI)    Level Of Support Discussed With Patient        Advance Directive: none on file  Surrogate decision maker: 3 sons    Past Medical History:   Diagnosis Date   • Cancer (HCC)     Lymphoma   • CVA (cerebral vascular accident) (HCC)    • Fatigue    • Hypertension    • Weight loss, abnormal     30 lbs last 3 months     Past Surgical History:   Procedure Laterality Date   • CARDIAC SURGERY      loop recorder after stroke   • CATARACT EXTRACTION         Reviewed current scheduled and prn medications for route, type, dose and  frequency.    Current Facility-Administered Medications   Medication Dose Route Frequency Provider Last Rate Last Admin   • ! Heparin Infusion - On hold due to levels   Does not apply Continuous PRN Son Forbes, PharmD       • !NOT ORDERED- apixaban (ELIQUIS)   Does not apply Daily With Dinner Todd Moya, [facility]       • acetaminophen (TYLENOL) tablet 650 mg  650 mg Oral Q4H PRN Halle Winn MD        Or   • acetaminophen (TYLENOL) 160 MG/5ML solution 650 mg  650 mg Oral Q4H PRN Halle Winn MD        Or   • acetaminophen (TYLENOL) suppository 650 mg  650 mg Rectal Q4H PRN Halle Winn MD       • albuterol sulfate HFA (PROVENTIL HFA;VENTOLIN HFA;PROAIR HFA) inhaler 2 puff  2 puff Inhalation 4x Daily - RT Halle Winn MD   2 puff at 02/17/22 0720   • allopurinol (ZYLOPRIM) tablet 300 mg  300 mg Oral Daily Halle Winn MD   300 mg at 02/17/22 0921   • aspirin chewable tablet 81 mg  81 mg Oral Daily Halle Winn MD   81 mg at 02/17/22 0921   • sennosides-docusate (PERICOLACE) 8.6-50 MG per tablet 2 tablet  2 tablet Oral BID Halle Winn MD   2 tablet at 02/17/22 0921    And   • polyethylene glycol (MIRALAX) packet 17 g  17 g Oral Daily PRN Halle Winn MD        And   • bisacodyl (DULCOLAX) EC tablet 5 mg  5 mg Oral Daily PRN Halle Winn MD        And   • bisacodyl (DULCOLAX) suppository 10 mg  10 mg Rectal Daily PRN Halle Winn MD       • dexamethasone (DECADRON) tablet 6 mg  6 mg Oral Daily With Breakfast Halle Winn MD   6 mg at 02/17/22 0921   • heparin injection 500 Units  5 mL Intravenous PRN Margaret Olson APRN       • lactated ringers infusion  75 mL/hr Intravenous Continuous Halle Winn MD 75 mL/hr at 02/17/22 1040 75 mL/hr at 02/17/22 1040   • LORazepam (ATIVAN) tablet 0.5 mg  0.5 mg Oral Q8H PRN Halle Winn MD       • melatonin tablet 5 mg  5 mg Oral Nightly PRN Halle Winn MD       • metoprolol succinate  XL (TOPROL-XL) 24 hr tablet 25 mg  25 mg Oral Daily Halle Winn MD   25 mg at 02/17/22 0921   • mirtazapine (REMERON) tablet 15 mg  15 mg Oral Nightly Halle Winn MD   15 mg at 02/16/22 2054   • ondansetron (ZOFRAN) tablet 4 mg  4 mg Oral Q6H PRN Halle Winn MD        Or   • ondansetron (ZOFRAN) injection 4 mg  4 mg Intravenous Q6H PRN Halle Winn MD       • Pharmacy to Dose Heparin   Does not apply Continuous PRN Margaret Olson APRN       • rosuvastatin (CRESTOR) tablet 20 mg  20 mg Oral Nightly Halle Winn MD   20 mg at 02/16/22 2054   • sodium chloride 0.9 % flush 10 mL  10 mL Intravenous PRN Halle Winn MD       • sodium chloride 0.9 % flush 10 mL  10 mL Intravenous Q12H Halle Winn MD   10 mL at 02/16/22 2057   • sodium chloride 0.9 % flush 10 mL  10 mL Intravenous PRN Halle Winn MD       • sodium chloride 0.9 % flush 10 mL  10 mL Intravenous Q12H Margaret Olson APRN   10 mL at 02/17/22 0922   • sodium chloride 0.9 % flush 10 mL  10 mL Intravenous PRN Margaret Olson APRN       • sodium chloride 0.9 % flush 20 mL  20 mL Intravenous PRN Margaret Olson APRN         Pharmacy Consult,   lactated ringers, 75 mL/hr, Last Rate: 75 mL/hr (02/17/22 1040)  Pharmacy to Dose Heparin,       •  Pharmacy Consult  •  acetaminophen **OR** acetaminophen **OR** acetaminophen  •  senna-docusate sodium **AND** polyethylene glycol **AND** bisacodyl **AND** bisacodyl  •  heparin  •  LORazepam  •  melatonin  •  ondansetron **OR** ondansetron  •  Pharmacy to Dose Heparin  •  sodium chloride  •  sodium chloride  •  sodium chloride  •  sodium chloride  Allergies   Allergen Reactions   • Codeine Nausea And Vomiting   • Penicillins Swelling     Family History   Problem Relation Age of Onset   • Cancer Mother    • COPD Father      Social History     Socioeconomic History   • Marital status:    Tobacco Use   • Smoking status: Former Smoker     Packs/day: 0.50     Years: 30.00  "    Pack years: 15.00     Types: Cigarettes   • Smokeless tobacco: Never Used   • Tobacco comment: quit 2 months ago   Vaping Use   • Vaping Use: Never used   Substance and Sexual Activity   • Alcohol use: Never   • Drug use: Never   • Sexual activity: Defer       ROS:  See HPI    PPS: 50%  /69 (BP Location: Right arm, Patient Position: Lying)   Pulse 83   Temp 98.7 °F (37.1 °C) (Oral)   Resp 20   Ht 165.1 cm (65\")   Wt 57.9 kg (127 lb 11.2 oz)   SpO2 93%   BMI 21.25 kg/m²   57.9 kg (127 lb 11.2 oz) Body mass index is 21.25 kg/m².  Intake & Output (last day)       02/16 0701 02/17 0700 02/17 0701 02/18 0700    Blood 720     Total Intake(mL/kg) 720 (12.4)     Urine (mL/kg/hr) 800     Total Output 800     Net -80                 Physical Exam:    Gen: frail elderly female lying in bed, NAD   HEENT: NCAT, EOMI, MMM   CV: HR RRR, no edema   Pulm: resp unlabored at rest, on room air   Neuro: alert, oriented, speech clear, no tremor   Psych: calm    Reviewed labs and diagnostic results.  Results from last 7 days   Lab Units 02/17/22  0742 02/17/22 0415 02/17/22  0415   WBC 10*3/mm3  --   --  10.91*   HEMOGLOBIN g/dL 7.6*   < > 7.8*   HEMATOCRIT % 22.5*   < > 23.6*   PLATELETS 10*3/mm3  --   --  156    < > = values in this interval not displayed.     Results from last 7 days   Lab Units 02/17/22  0415 02/16/22  1047 02/16/22  1047   SODIUM mmol/L 140   < > 140   POTASSIUM mmol/L 4.1   < > 3.8   CHLORIDE mmol/L 108*   < > 107   CO2 mmol/L 23.0   < > 22.0   BUN mg/dL 42*   < > 44*   CREATININE mg/dL 0.71   < > 0.77   CALCIUM mg/dL 8.1*   < > 8.5*   BILIRUBIN mg/dL  --   --  0.3   ALK PHOS U/L  --   --  46   ALT (SGPT) U/L  --   --  16   AST (SGOT) U/L  --   --  20   GLUCOSE mg/dL 113*   < > 128*    < > = values in this interval not displayed.       Impression: 76 y.o. year old female with GIB, bilat PE's    Plan:   Plan to move forward with any procedures and/or treatments for current problems. Has another " pet scan on 2/28, hopeful she can resume treatment soon. Would be willing to stop treatments if they become overly burdensome. Does not have LW and not interested in making one. Says 3 sons are to make decisions for her if she can't. DNR/DNI already and confirmed, also add no HD, no artifical nutrition. Intake as tolerated when diet resumed. Living at home alone up until about 3 weeks ago when she moved in with her son. Plans to d/c home when ready.     Britany Ball NP  [phone]  02/17/22  10:58 EST    Time:25 minutes spent reviewing medical and medication records, assessing and examining patient, discussing with patient and nursing staff, answering questions, formulating a plan and documentation of care. > 50% time spent face to face         Electronically signed by Britany Ball NP at 02/17/22 1118     Adelaida Al MD at 02/17/22 0836      Consult Orders    1. Inpatient Hematology & Oncology Consult [[id number]] ordered by Halle Winn MD at 02/16/22 1611               Subjective     CHIEF COMPLAINT: Shortness of breath with fatigue    HISTORY OF PRESENT ILLNESS:  The patient is a 76 y.o. female, referred by Halle Winn MD for diffuse large B-cell lymphoma.  The patient was recently discharged from the hospital after admission for Covid pneumonia.  She has been doing poorly at home.  She cannot move around without getting extreme shortness of breath.  She has been having dizziness when she change position.  She denies any active bleeding no change in urine or stool color.  No recent travel or ill contacts.  Her family were concerned and brought her to the emergency room at [facility] CT PE protocol revealed stable small bilateral PEs with peripheral bilateral lung infiltrates worse in the lower lobes consistent with Covid pneumonia.  The patient was admitted to hospitalist service and was consulted to further assist in her care.      REVIEW OF SYSTEMS:  A 14 point review  of systems was performed and is negative except as noted above.    Past Medical History:   Diagnosis Date   • Cancer (HCC)     Lymphoma   • CVA (cerebral vascular accident) (HCC)    • Fatigue    • Hypertension    • Weight loss, abnormal     30 lbs last 3 months       No current facility-administered medications on file prior to encounter.     Current Outpatient Medications on File Prior to Encounter   Medication Sig Dispense Refill   • acetaminophen (TYLENOL) 500 MG tablet Take 1 tablet by mouth Every 6 (Six) Hours As Needed for Mild Pain  or Moderate Pain .     • albuterol sulfate  (90 Base) MCG/ACT inhaler Inhale 2 puffs 4 (Four) Times a Day. 18 g 0   • allopurinol (Zyloprim) 300 MG tablet Take 1 tablet by mouth Daily. 30 tablet 1   • Apixaban Starter Pack (Eliquis DVT/PE Starter Pack) tablet therapy pack Take two 5 mg tablets by mouth every 12 hours for 7 days. Followed by one 5 mg tablet every 12 hours. (Dispense starter pack if available) 74 tablet 5   • Apremilast (OTEZLA PO) 30 mg.     • aspirin (Aspirin 81) 81 MG chewable tablet Aspir-81     • dexamethasone (DECADRON) 6 MG tablet Take 1 tablet by mouth Daily With Breakfast for 7 doses. 7 tablet 0   • lidocaine-prilocaine (EMLA) 2.5-2.5 % cream Apply 1 application topically to the appropriate area as directed As Needed (45-60 minutes prior to port access.  Cover with saran/plastic wrap.). 30 g 3   • megestrol (MEGACE) 40 MG/ML suspension TAKE 20 ML BY MOUTH  ONCE DAILY 480 mL 0   • metoprolol succinate XL (TOPROL-XL) 25 MG 24 hr tablet Take 1 tablet by mouth Daily.     • Metoprolol-hydroCHLOROthiazide (DUTOPROL PO) metoprolol rooney-hydrochlorothiaz     • mirtazapine (REMERON) 15 MG tablet Take 1 tablet by mouth Every Night. 30 tablet 1   • nitrofurantoin, macrocrystal-monohydrate, (MACROBID) 100 MG capsule      • nystatin (MYCOSTATIN) 100,000 unit/mL suspension Swish and swallow 5 mL 4 (Four) Times a Day. 200 mL 0   • ondansetron (ZOFRAN) 8 MG tablet Take  1 tablet by mouth 3 (Three) Times a Day As Needed for Nausea or Vomiting. 30 tablet 5   • ondansetron ODT (Zofran ODT) 4 MG disintegrating tablet Place 1 tablet on the tongue Every 8 (Eight) Hours As Needed for Nausea or Vomiting. 30 tablet 0   • potassium chloride (K-DUR,KLOR-CON) 20 MEQ CR tablet Take 1 tablet by mouth Daily. 7 tablet 0   • potassium chloride 10 MEQ CR tablet      • predniSONE (DELTASONE) 20 MG tablet Take 60 mg daily for 2 days with food.  Taper by 20 mg every 2 days until complete 12 tablet 0   • rosuvastatin (CRESTOR) 20 MG tablet Take 20 mg by mouth.         Allergies   Allergen Reactions   • Codeine Nausea And Vomiting   • Penicillins Swelling       Past Surgical History:   Procedure Laterality Date   • CARDIAC SURGERY      loop recorder after stroke   • CATARACT EXTRACTION         OB History   No obstetric history on file.       Social History     Socioeconomic History   • Marital status:    Tobacco Use   • Smoking status: Former Smoker     Packs/day: 0.50     Years: 30.00     Pack years: 15.00     Types: Cigarettes   • Smokeless tobacco: Never Used   • Tobacco comment: quit 2 months ago   Vaping Use   • Vaping Use: Never used   Substance and Sexual Activity   • Alcohol use: Never   • Drug use: Never   • Sexual activity: Defer       Family History   Problem Relation Age of Onset   • Cancer Mother    • COPD Father        Objective     Vitals:    02/17/22 0038 02/17/22 0446 02/17/22 0720 02/17/22 0749   BP: 128/79 118/75  111/69   BP Location:  Right arm  Right arm   Patient Position:  Lying  Lying   Pulse: 74   83   Resp: 18 20 21 20   Temp: 98.4 °F (36.9 °C) 98.4 °F (36.9 °C)  98.7 °F (37.1 °C)   TempSrc: Oral Oral  Oral   SpO2: 97%   93%   Weight:  57.9 kg (127 lb 11.2 oz)     Height:                            ECOG Performance Status: 3 - Symptomatic, >50% confined to bed  General: well appearing female in no acute distress  Neuro/Psych: A&O x 3, gait steady, appropriate affect,  strength 5/5 in all muscle groups  HEENT: sclerae anicteric, oropharynx clear  Lymphatics: no cervical, supraclavicular, or axillary adenopathy  Cardiovascular: regular rate and rhythm, no murmurs  Lungs: clear to auscultation bilaterally  Abdomen: soft, nontender, nondistended.  No palpable organomegaly  Extremities: no lower extremity edema  Skin: no rashes, lesions, bruising, or petechiae      Admission on 02/16/2022   Component Date Value Ref Range Status   • QT Interval 02/16/2022 328  ms Final   • QTC Interval 02/16/2022 437  ms Final   • Glucose 02/16/2022 128* 65 - 99 mg/dL Final   • BUN 02/16/2022 44* 8 - 23 mg/dL Final   • Creatinine 02/16/2022 0.77  0.57 - 1.00 mg/dL Final   • Sodium 02/16/2022 140  136 - 145 mmol/L Final   • Potassium 02/16/2022 3.8  3.5 - 5.2 mmol/L Final   • Chloride 02/16/2022 107  98 - 107 mmol/L Final   • CO2 02/16/2022 22.0  22.0 - 29.0 mmol/L Final   • Calcium 02/16/2022 8.5* 8.6 - 10.5 mg/dL Final   • Total Protein 02/16/2022 5.0* 6.0 - 8.5 g/dL Final   • Albumin 02/16/2022 2.90* 3.50 - 5.20 g/dL Final   • ALT (SGPT) 02/16/2022 16  1 - 33 U/L Final   • AST (SGOT) 02/16/2022 20  1 - 32 U/L Final   • Alkaline Phosphatase 02/16/2022 46  39 - 117 U/L Final   • Total Bilirubin 02/16/2022 0.3  0.0 - 1.2 mg/dL Final   • eGFR Non  Amer 02/16/2022 72  >60 mL/min/1.73 Final   • Globulin 02/16/2022 2.1  gm/dL Final    Calculated Result   • A/G Ratio 02/16/2022 1.4  g/dL Final   • BUN/Creatinine Ratio 02/16/2022 57.1* 7.0 - 25.0 Final   • Anion Gap 02/16/2022 11.0  5.0 - 15.0 mmol/L Final   • proBNP 02/16/2022 1,521.0  0.0-1,800.0 pg/mL Final   • Troponin T 02/16/2022 0.030  0.000 - 0.030 ng/mL Final   • Extra Tube 02/16/2022 Hold for add-ons.   Final    Auto resulted.   • Extra Tube 02/16/2022 Hold for add-ons.   Final    Auto resulted.   • Extra Tube 02/16/2022 Hold for add-ons.   Final    Auto resulted.   • Extra Tube 02/16/2022 hold for add-on   Final    Auto resulted   • WBC  02/16/2022 18.18* 3.40 - 10.80 10*3/mm3 Final   • RBC 02/16/2022 2.10* 3.77 - 5.28 10*6/mm3 Final   • Hemoglobin 02/16/2022 7.0* 12.0 - 15.9 g/dL Final   • Hematocrit 02/16/2022 21.2* 34.0 - 46.6 % Final   • MCV 02/16/2022 101.0* 79.0 - 97.0 fL Final   • MCH 02/16/2022 33.3* 26.6 - 33.0 pg Final   • MCHC 02/16/2022 33.0  31.5 - 35.7 g/dL Final   • RDW 02/16/2022 20.9* 12.3 - 15.4 % Final   • RDW-SD 02/16/2022 72.4* 37.0 - 54.0 fl Final   • MPV 02/16/2022 12.1* 6.0 - 12.0 fL Final   • Platelets 02/16/2022 199  140 - 450 10*3/mm3 Final   • Neutrophil % 02/16/2022 86.0* 42.7 - 76.0 % Final   • Lymphocyte % 02/16/2022 3.5* 19.6 - 45.3 % Final   • Monocyte % 02/16/2022 4.5* 5.0 - 12.0 % Final   • Eosinophil % 02/16/2022 0.0* 0.3 - 6.2 % Final   • Basophil % 02/16/2022 0.2  0.0 - 1.5 % Final   • Immature Grans % 02/16/2022 5.8* 0.0 - 0.5 % Final   • Neutrophils, Absolute 02/16/2022 15.62* 1.70 - 7.00 10*3/mm3 Final   • Lymphocytes, Absolute 02/16/2022 0.64* 0.70 - 3.10 10*3/mm3 Final   • Monocytes, Absolute 02/16/2022 0.82  0.10 - 0.90 10*3/mm3 Final   • Eosinophils, Absolute 02/16/2022 0.00  0.00 - 0.40 10*3/mm3 Final   • Basophils, Absolute 02/16/2022 0.04  0.00 - 0.20 10*3/mm3 Final   • Immature Grans, Absolute 02/16/2022 1.06* 0.00 - 0.05 10*3/mm3 Final   • nRBC 02/16/2022 0.6* 0.0 - 0.2 /100 WBC Final   • Protime 02/16/2022 23.7* 11.4 - 14.4 Seconds Final   • INR 02/16/2022 2.21* 0.85 - 1.16 Final   • Magnesium 02/16/2022 2.2  1.6 - 2.4 mg/dL Final   • Free T4 02/16/2022 0.86* 0.93 - 1.70 ng/dL Final   • TSH 02/16/2022 1.010  0.270 - 4.200 uIU/mL Final   • Color, UA 02/17/2022 Yellow  Yellow, Straw Final   • Appearance, UA 02/17/2022 Cloudy* Clear Final   • pH, UA 02/17/2022 5.5  5.0 - 8.0 Final   • Specific Gravity, UA 02/17/2022 1.032* 1.001 - 1.030 Final   • Glucose, UA 02/17/2022 Negative  Negative Final   • Ketones, UA 02/17/2022 Negative  Negative Final   • Bilirubin, UA 02/17/2022 Negative  Negative Final   •  Blood, UA 02/17/2022 Small (1+)* Negative Final   • Protein, UA 02/17/2022 100 mg/dL (2+)* Negative Final   • Leuk Esterase, UA 02/17/2022 Trace* Negative Final   • Nitrite, UA 02/17/2022 Negative  Negative Final   • Urobilinogen, UA 02/17/2022 1.0 E.U./dL  0.2 - 1.0 E.U./dL Final   • Fecal Occult Blood 02/16/2022 Positive* Negative Final   • Lot Number 02/16/2022 [id number]   Final   • Expiration Date 02/16/2022 9-24   Final   • DEVELOPER LOT NUMBER 02/16/2022 [id number]   Final   • DEVELOPER EXPIRATION DATE 02/16/2022 6/24   Final   • Positive Control 02/16/2022 Positive  Positive Final   • Negative Control 02/16/2022 Positive* Negative Final   • ABO Type 02/16/2022 O   Final   • RH type 02/16/2022 Positive   Final   • Antibody Screen 02/16/2022 Negative   Final   • T&S Expiration Date 02/16/2022 2/19/2022 11:59:59 PM   Final   • Lactate 02/16/2022 2.4* 0.5 - 2.0 mmol/L Final    Falsely depressed results may occur on samples drawn from patients receiving N-Acetylcysteine (NAC) or Metamizole.   • Procalcitonin 02/16/2022 0.11  0.00 - 0.25 ng/mL Final   • Lactate 02/16/2022 1.5  0.5 - 2.0 mmol/L Final    Falsely depressed results may occur on samples drawn from patients receiving N-Acetylcysteine (NAC) or Metamizole.   • Product Code 02/16/2022 L5059V10   Final   • Unit Number 02/16/2022 [id number]   Final   • UNIT  ABO 02/16/2022 O   Final   • UNIT  RH 02/16/2022 POS   Final   • Crossmatch Interpretation 02/16/2022 Compatible   Final   • Dispense Status 02/16/2022 IS   Final   • Blood Expiration Date 02/16/2022 [id number]   Final   • Blood Type Barcode 02/16/2022 5100   Final   • Protime 02/17/2022 21.5* 11.4 - 14.4 Seconds Final   • INR 02/17/2022 1.96* 0.85 - 1.16 Final   • WBC 02/17/2022 10.91* 3.40 - 10.80 10*3/mm3 Final   • RBC 02/17/2022 2.41* 3.77 - 5.28 10*6/mm3 Final   • Hemoglobin 02/17/2022 7.8* 12.0 - 15.9 g/dL Final   • Hematocrit 02/17/2022 23.6* 34.0 - 46.6 % Final   • MCV 02/17/2022 97.9* 79.0 - 97.0  fL Final   • MCH 02/17/2022 32.4  26.6 - 33.0 pg Final   • MCHC 02/17/2022 33.1  31.5 - 35.7 g/dL Final   • RDW 02/17/2022 20.9* 12.3 - 15.4 % Final   • RDW-SD 02/17/2022 66.4* 37.0 - 54.0 fl Final   • MPV 02/17/2022 11.8  6.0 - 12.0 fL Final   • Platelets 02/17/2022 156  140 - 450 10*3/mm3 Final   • Neutrophil % 02/17/2022 83.8* 42.7 - 76.0 % Final   • Lymphocyte % 02/17/2022 4.5* 19.6 - 45.3 % Final   • Monocyte % 02/17/2022 4.4* 5.0 - 12.0 % Final   • Eosinophil % 02/17/2022 0.0* 0.3 - 6.2 % Final   • Basophil % 02/17/2022 0.2  0.0 - 1.5 % Final   • Immature Grans % 02/17/2022 7.1* 0.0 - 0.5 % Final   • Neutrophils, Absolute 02/17/2022 9.15* 1.70 - 7.00 10*3/mm3 Final   • Lymphocytes, Absolute 02/17/2022 0.49* 0.70 - 3.10 10*3/mm3 Final   • Monocytes, Absolute 02/17/2022 0.48  0.10 - 0.90 10*3/mm3 Final   • Eosinophils, Absolute 02/17/2022 0.00  0.00 - 0.40 10*3/mm3 Final   • Basophils, Absolute 02/17/2022 0.02  0.00 - 0.20 10*3/mm3 Final   • Immature Grans, Absolute 02/17/2022 0.77* 0.00 - 0.05 10*3/mm3 Final   • nRBC 02/17/2022 0.7* 0.0 - 0.2 /100 WBC Final   • Heparin Anti-Xa (UFH) 02/17/2022 >1.10* 0.30 - 0.70 IU/ml Final   • PTT 02/17/2022 25.6  22.0 - 39.0 seconds Final   • Glucose 02/17/2022 113* 65 - 99 mg/dL Final   • BUN 02/17/2022 42* 8 - 23 mg/dL Final   • Creatinine 02/17/2022 0.71  0.57 - 1.00 mg/dL Final   • Sodium 02/17/2022 140  136 - 145 mmol/L Final   • Potassium 02/17/2022 4.1  3.5 - 5.2 mmol/L Final   • Chloride 02/17/2022 108* 98 - 107 mmol/L Final   • CO2 02/17/2022 23.0  22.0 - 29.0 mmol/L Final   • Calcium 02/17/2022 8.1* 8.6 - 10.5 mg/dL Final   • eGFR Non  Amer 02/17/2022 80  >60 mL/min/1.73 Final   • BUN/Creatinine Ratio 02/17/2022 59.2* 7.0 - 25.0 Final   • Anion Gap 02/17/2022 9.0  5.0 - 15.0 mmol/L Final   • Hemoglobin 02/17/2022 7.6* 12.0 - 15.9 g/dL Final   • Hematocrit 02/17/2022 22.5* 34.0 - 46.6 % Final   • RBC Morphology 02/17/2022 Normal  Normal Final   • WBC  Morphology 02/17/2022 Normal  Normal Final   • Platelet Morphology 02/17/2022 Normal  Normal Final   • RBC, UA 02/17/2022 0-2  None Seen, 0-2 /HPF Final   • WBC, UA 02/17/2022 3-5* None Seen, 0-2 /HPF Final   • Bacteria, UA 02/17/2022 Trace  None Seen, Trace /HPF Final   • Squamous Epithelial Cells, UA 02/17/2022 3-6* None Seen, 0-2 /HPF Final   • Hyaline Casts, UA 02/17/2022 0-6  0 - 6 /LPF Final   • Calcium Oxalate Crystals, UA 02/17/2022 Small/1+  None Seen /HPF Final   • Amorphous Crystals, UA 02/17/2022 Small/1+  None Seen /HPF Final   • Methodology 02/17/2022 Manual Light Microscopy   Final        No results found.    ASSESSMENT 76 years old female with symptomatic anemia    PROBLEM LIST AND DISCUSSION:  1.  Symptomatic anemia: Induced by previous chemotherapy as well as suppressed bone marrow from chronic inflammation and acute infection.  Rule out bleeding.  2.  Diffuse large B-cell lymphoma: Status post 3 cycles of chemotherapy using R-CHOP.  Patient scheduled for restaging work-up next week.  3.  Covid pneumonia: Patient saturation is normal without any oxygen supplement.  Her levels drop with activity.  4.  Weakness    PLAN  1.  I reviewed the patient's chart including admission notes blood work results and imaging report.  I reviewed the patient's CT scan films myself.  2.  Agree with blood transfusion trying to maintain hemoglobin more than 8 given her weakness and shortness of breath.  3.  Her anticoagulation is on hold for now given her positive stool guaiac.  She may need to have GI evaluation.  4.  If hemoglobin remains stable after transfusion patient might be able to go home.  5.  Physical therapy evaluation.  6.  Follow-up with me as scheduled with repeated PET scan next week.    Discussed with patient's son Louis over the phone to coordinate patient care.  He helped me important parts of the medical history.  Adelaida Al MD    2/17/2022      Electronically signed by Adelaida Al MD at  02/17/22 0839

## 2022-02-18 NOTE — PROGRESS NOTES
Discussed patient with Dr. Winn.  Family and patient have elected to pursue comfort measures.   Hospice has been consulted.    Will discontinue q4h H&H    We will sign off.  Please call for any questions or concerns

## 2022-02-18 NOTE — PROGRESS NOTES
DATE OF VISIT: 2/18/2022    Chief Complaint: Followup for diffuse large B-cell lymphoma.     SUBJECTIVE: The patient has been doing fairly well.  She  denied any fever or  chills, no night sweats, denied any headaches    REVIEW OF SYSTEMS: All the other 9 systems are reviewed by me and negative  except what is mentioned in HPI.     PAST MEDICAL HISTORY/SOCIAL HISTORY/FAMILY HISTORY: Unchanged from my prior documentation done on February 17, 2022      Current Facility-Administered Medications:   •  acetaminophen (TYLENOL) tablet 650 mg, 650 mg, Oral, Q4H PRN **OR** acetaminophen (TYLENOL) 160 MG/5ML solution 650 mg, 650 mg, Oral, Q4H PRN **OR** acetaminophen (TYLENOL) suppository 650 mg, 650 mg, Rectal, Q4H PRN, Halle Winn MD  •  albuterol sulfate HFA (PROVENTIL HFA;VENTOLIN HFA;PROAIR HFA) inhaler 2 puff, 2 puff, Inhalation, 4x Daily - RT, Halle Winn MD, 2 puff at 02/18/22 1253  •  allopurinol (ZYLOPRIM) tablet 300 mg, 300 mg, Oral, Daily, Halle Winn MD, 300 mg at 02/18/22 0845  •  apixaban (ELIQUIS) tablet 5 mg, 5 mg, Oral, Q12H, Halle Winn MD, 5 mg at 02/18/22 1438  •  aspirin chewable tablet 81 mg, 81 mg, Oral, Daily, Halle Winn MD, 81 mg at 02/18/22 0845  •  sennosides-docusate (PERICOLACE) 8.6-50 MG per tablet 2 tablet, 2 tablet, Oral, BID, 2 tablet at 02/18/22 0845 **AND** polyethylene glycol (MIRALAX) packet 17 g, 17 g, Oral, Daily PRN **AND** bisacodyl (DULCOLAX) EC tablet 5 mg, 5 mg, Oral, Daily PRN **AND** bisacodyl (DULCOLAX) suppository 10 mg, 10 mg, Rectal, Daily PRN, Halle Winn MD  •  dexamethasone (DECADRON) tablet 6 mg, 6 mg, Oral, Daily With Breakfast, Halle Winn MD, 6 mg at 02/18/22 0845  •  heparin injection 500 Units, 5 mL, Intravenous, PRN, Margaret Olson, APRN  •  hydrocortisone (ANUSOL-HC) suppository 25 mg, 25 mg, Rectal, BID, Halle Winn MD  •  LORazepam (ATIVAN) tablet 0.5 mg, 0.5 mg, Oral, Q8H PRN, Halle Winn MD  •   "melatonin tablet 5 mg, 5 mg, Oral, Nightly PRN, Halle Winn MD, 5 mg at 02/17/22 2042  •  metoprolol succinate XL (TOPROL-XL) 24 hr tablet 25 mg, 25 mg, Oral, Daily, Halle Winn MD, 25 mg at 02/18/22 0845  •  mirtazapine (REMERON) tablet 15 mg, 15 mg, Oral, Nightly, Halle Winn MD, 15 mg at 02/17/22 2041  •  morphine concentrated solution 5 mg, 5 mg, Oral, Q3H PRN, Mike George, DO  •  ondansetron (ZOFRAN) tablet 4 mg, 4 mg, Oral, Q6H PRN **OR** ondansetron (ZOFRAN) injection 4 mg, 4 mg, Intravenous, Q6H PRN, Halle Winn MD  •  rosuvastatin (CRESTOR) tablet 20 mg, 20 mg, Oral, Nightly, Halle Winn MD, 20 mg at 02/17/22 2042  •  sodium chloride 0.9 % flush 10 mL, 10 mL, Intravenous, PRN, Halle Winn MD  •  sodium chloride 0.9 % flush 10 mL, 10 mL, Intravenous, Q12H, Halle Winn MD, 10 mL at 02/17/22 2100  •  sodium chloride 0.9 % flush 10 mL, 10 mL, Intravenous, PRN, Halle Winn MD  •  sodium chloride 0.9 % flush 10 mL, 10 mL, Intravenous, Q12H, Margaret Olson APRN, 10 mL at 02/18/22 0845  •  sodium chloride 0.9 % flush 10 mL, 10 mL, Intravenous, PRN, Margaret Olson APRN  •  sodium chloride 0.9 % flush 20 mL, 20 mL, Intravenous, PRN, Margaret Olson APRN    PHYSICAL EXAMINATION:   /68 (BP Location: Left arm, Patient Position: Sitting)   Pulse 90   Temp 98.9 °F (37.2 °C) (Oral)   Resp 16   Ht 165.1 cm (65\")   Wt 57.9 kg (127 lb 11.2 oz)   SpO2 96%   BMI 21.25 kg/m²                ECOG Performance Status: 3 - Symptomatic, >50% confined to bed  GENERAL: Age appropriate. No acute distress.   NEURO/PSYCH: A&O x 3, strength 5/5 in all muscle groups  HEENT: Head atraumatic, normocephalic.   NECK: Supple. No JVD. No lymphadenopathy.   LUNGS: Clear to auscultation bilaterally. No wheezing. No rhonchi.   HEART: Regular rate and rhythm. S1, S2, no murmurs.   ABDOMEN: Soft, nontender, nondistended. Bowel sounds positive. No  hepatosplenomegaly. "   EXTREMITIES: No clubbing, cyanosis, or edema.   SKIN: No rashes. No purpura.       Admission on 02/16/2022   Component Date Value Ref Range Status   • QT Interval 02/16/2022 328  ms Final   • QTC Interval 02/16/2022 437  ms Final   • Glucose 02/16/2022 128* 65 - 99 mg/dL Final   • BUN 02/16/2022 44* 8 - 23 mg/dL Final   • Creatinine 02/16/2022 0.77  0.57 - 1.00 mg/dL Final   • Sodium 02/16/2022 140  136 - 145 mmol/L Final   • Potassium 02/16/2022 3.8  3.5 - 5.2 mmol/L Final   • Chloride 02/16/2022 107  98 - 107 mmol/L Final   • CO2 02/16/2022 22.0  22.0 - 29.0 mmol/L Final   • Calcium 02/16/2022 8.5* 8.6 - 10.5 mg/dL Final   • Total Protein 02/16/2022 5.0* 6.0 - 8.5 g/dL Final   • Albumin 02/16/2022 2.90* 3.50 - 5.20 g/dL Final   • ALT (SGPT) 02/16/2022 16  1 - 33 U/L Final   • AST (SGOT) 02/16/2022 20  1 - 32 U/L Final   • Alkaline Phosphatase 02/16/2022 46  39 - 117 U/L Final   • Total Bilirubin 02/16/2022 0.3  0.0 - 1.2 mg/dL Final   • eGFR Non  Amer 02/16/2022 72  >60 mL/min/1.73 Final   • Globulin 02/16/2022 2.1  gm/dL Final    Calculated Result   • A/G Ratio 02/16/2022 1.4  g/dL Final   • BUN/Creatinine Ratio 02/16/2022 57.1* 7.0 - 25.0 Final   • Anion Gap 02/16/2022 11.0  5.0 - 15.0 mmol/L Final   • proBNP 02/16/2022 1,521.0  0.0-1,800.0 pg/mL Final   • Troponin T 02/16/2022 0.030  0.000 - 0.030 ng/mL Final   • Extra Tube 02/16/2022 Hold for add-ons.   Final    Auto resulted.   • Extra Tube 02/16/2022 Hold for add-ons.   Final    Auto resulted.   • Extra Tube 02/16/2022 Hold for add-ons.   Final    Auto resulted.   • Extra Tube 02/16/2022 hold for add-on   Final    Auto resulted   • WBC 02/16/2022 18.18* 3.40 - 10.80 10*3/mm3 Final   • RBC 02/16/2022 2.10* 3.77 - 5.28 10*6/mm3 Final   • Hemoglobin 02/16/2022 7.0* 12.0 - 15.9 g/dL Final   • Hematocrit 02/16/2022 21.2* 34.0 - 46.6 % Final   • MCV 02/16/2022 101.0* 79.0 - 97.0 fL Final   • MCH 02/16/2022 33.3* 26.6 - 33.0 pg Final   • MCHC 02/16/2022  33.0  31.5 - 35.7 g/dL Final   • RDW 02/16/2022 20.9* 12.3 - 15.4 % Final   • RDW-SD 02/16/2022 72.4* 37.0 - 54.0 fl Final   • MPV 02/16/2022 12.1* 6.0 - 12.0 fL Final   • Platelets 02/16/2022 199  140 - 450 10*3/mm3 Final   • Neutrophil % 02/16/2022 86.0* 42.7 - 76.0 % Final   • Lymphocyte % 02/16/2022 3.5* 19.6 - 45.3 % Final   • Monocyte % 02/16/2022 4.5* 5.0 - 12.0 % Final   • Eosinophil % 02/16/2022 0.0* 0.3 - 6.2 % Final   • Basophil % 02/16/2022 0.2  0.0 - 1.5 % Final   • Immature Grans % 02/16/2022 5.8* 0.0 - 0.5 % Final   • Neutrophils, Absolute 02/16/2022 15.62* 1.70 - 7.00 10*3/mm3 Final   • Lymphocytes, Absolute 02/16/2022 0.64* 0.70 - 3.10 10*3/mm3 Final   • Monocytes, Absolute 02/16/2022 0.82  0.10 - 0.90 10*3/mm3 Final   • Eosinophils, Absolute 02/16/2022 0.00  0.00 - 0.40 10*3/mm3 Final   • Basophils, Absolute 02/16/2022 0.04  0.00 - 0.20 10*3/mm3 Final   • Immature Grans, Absolute 02/16/2022 1.06* 0.00 - 0.05 10*3/mm3 Final   • nRBC 02/16/2022 0.6* 0.0 - 0.2 /100 WBC Final   • Protime 02/16/2022 23.7* 11.4 - 14.4 Seconds Final   • INR 02/16/2022 2.21* 0.85 - 1.16 Final   • Magnesium 02/16/2022 2.2  1.6 - 2.4 mg/dL Final   • Free T4 02/16/2022 0.86* 0.93 - 1.70 ng/dL Final   • TSH 02/16/2022 1.010  0.270 - 4.200 uIU/mL Final   • Color, UA 02/17/2022 Yellow  Yellow, Straw Final   • Appearance, UA 02/17/2022 Cloudy* Clear Final   • pH, UA 02/17/2022 5.5  5.0 - 8.0 Final   • Specific Gravity, UA 02/17/2022 1.032* 1.001 - 1.030 Final   • Glucose, UA 02/17/2022 Negative  Negative Final   • Ketones, UA 02/17/2022 Negative  Negative Final   • Bilirubin, UA 02/17/2022 Negative  Negative Final   • Blood, UA 02/17/2022 Small (1+)* Negative Final   • Protein, UA 02/17/2022 100 mg/dL (2+)* Negative Final   • Leuk Esterase, UA 02/17/2022 Trace* Negative Final   • Nitrite, UA 02/17/2022 Negative  Negative Final   • Urobilinogen, UA 02/17/2022 1.0 E.U./dL  0.2 - 1.0 E.U./dL Final   • Fecal Occult Blood 02/16/2022  Positive* Negative Final   • Lot Number 02/16/2022 [id number]   Final   • Expiration Date 02/16/2022 9-24   Final   • DEVELOPER LOT NUMBER 02/16/2022 [id number]   Final   • DEVELOPER EXPIRATION DATE 02/16/2022 6/24   Final   • Positive Control 02/16/2022 Positive  Positive Final   • Negative Control 02/16/2022 Positive* Negative Final   • ABO Type 02/16/2022 O   Final   • RH type 02/16/2022 Positive   Final   • Antibody Screen 02/16/2022 Negative   Final   • T&S Expiration Date 02/16/2022 2/19/2022 11:59:59 PM   Final   • Blood Culture 02/16/2022 No growth at 2 days   Preliminary   • Blood Culture 02/16/2022 No growth at 2 days   Preliminary   • Lactate 02/16/2022 2.4* 0.5 - 2.0 mmol/L Final    Falsely depressed results may occur on samples drawn from patients receiving N-Acetylcysteine (NAC) or Metamizole.   • Procalcitonin 02/16/2022 0.11  0.00 - 0.25 ng/mL Final   • Lactate 02/16/2022 1.5  0.5 - 2.0 mmol/L Final    Falsely depressed results may occur on samples drawn from patients receiving N-Acetylcysteine (NAC) or Metamizole.   • Product Code 02/17/2022 [id number]   Final   • Unit Number 02/17/2022 [id number]   Final   • UNIT  ABO 02/17/2022 O   Final   • UNIT  RH 02/17/2022 POS   Final   • Crossmatch Interpretation 02/17/2022 Compatible   Final   • Dispense Status 02/17/2022 PT   Final   • Blood Expiration Date 02/17/2022 [id number]   Final   • Blood Type Barcode 02/17/2022 5100   Final   • Protime 02/17/2022 21.5* 11.4 - 14.4 Seconds Final   • INR 02/17/2022 1.96* 0.85 - 1.16 Final   • WBC 02/17/2022 10.91* 3.40 - 10.80 10*3/mm3 Final   • RBC 02/17/2022 2.41* 3.77 - 5.28 10*6/mm3 Final   • Hemoglobin 02/17/2022 7.8* 12.0 - 15.9 g/dL Final   • Hematocrit 02/17/2022 23.6* 34.0 - 46.6 % Final   • MCV 02/17/2022 97.9* 79.0 - 97.0 fL Final   • MCH 02/17/2022 32.4  26.6 - 33.0 pg Final   • MCHC 02/17/2022 33.1  31.5 - 35.7 g/dL Final   • RDW 02/17/2022 20.9* 12.3 - 15.4 % Final   • RDW-SD 02/17/2022 66.4* 37.0 - 54.0  fl Final   • MPV 02/17/2022 11.8  6.0 - 12.0 fL Final   • Platelets 02/17/2022 156  140 - 450 10*3/mm3 Final   • Neutrophil % 02/17/2022 83.8* 42.7 - 76.0 % Final   • Lymphocyte % 02/17/2022 4.5* 19.6 - 45.3 % Final   • Monocyte % 02/17/2022 4.4* 5.0 - 12.0 % Final   • Eosinophil % 02/17/2022 0.0* 0.3 - 6.2 % Final   • Basophil % 02/17/2022 0.2  0.0 - 1.5 % Final   • Immature Grans % 02/17/2022 7.1* 0.0 - 0.5 % Final   • Neutrophils, Absolute 02/17/2022 9.15* 1.70 - 7.00 10*3/mm3 Final   • Lymphocytes, Absolute 02/17/2022 0.49* 0.70 - 3.10 10*3/mm3 Final   • Monocytes, Absolute 02/17/2022 0.48  0.10 - 0.90 10*3/mm3 Final   • Eosinophils, Absolute 02/17/2022 0.00  0.00 - 0.40 10*3/mm3 Final   • Basophils, Absolute 02/17/2022 0.02  0.00 - 0.20 10*3/mm3 Final   • Immature Grans, Absolute 02/17/2022 0.77* 0.00 - 0.05 10*3/mm3 Final   • nRBC 02/17/2022 0.7* 0.0 - 0.2 /100 WBC Final   • Heparin Anti-Xa (UFH) 02/17/2022 >1.10* 0.30 - 0.70 IU/ml Final   • PTT 02/17/2022 25.6  22.0 - 39.0 seconds Final   • Glucose 02/17/2022 113* 65 - 99 mg/dL Final   • BUN 02/17/2022 42* 8 - 23 mg/dL Final   • Creatinine 02/17/2022 0.71  0.57 - 1.00 mg/dL Final   • Sodium 02/17/2022 140  136 - 145 mmol/L Final   • Potassium 02/17/2022 4.1  3.5 - 5.2 mmol/L Final   • Chloride 02/17/2022 108* 98 - 107 mmol/L Final   • CO2 02/17/2022 23.0  22.0 - 29.0 mmol/L Final   • Calcium 02/17/2022 8.1* 8.6 - 10.5 mg/dL Final   • eGFR Non  Amer 02/17/2022 80  >60 mL/min/1.73 Final   • BUN/Creatinine Ratio 02/17/2022 59.2* 7.0 - 25.0 Final   • Anion Gap 02/17/2022 9.0  5.0 - 15.0 mmol/L Final   • Hemoglobin 02/17/2022 7.6* 12.0 - 15.9 g/dL Final   • Hematocrit 02/17/2022 22.5* 34.0 - 46.6 % Final   • Hemoglobin 02/17/2022 7.8* 12.0 - 15.9 g/dL Final   • Hematocrit 02/17/2022 23.0* 34.0 - 46.6 % Final   • RBC Morphology 02/17/2022 Normal  Normal Final   • WBC Morphology 02/17/2022 Normal  Normal Final   • Platelet Morphology 02/17/2022 Normal  Normal  Final   • RBC, UA 02/17/2022 0-2  None Seen, 0-2 /HPF Final   • WBC, UA 02/17/2022 3-5* None Seen, 0-2 /HPF Final   • Bacteria, UA 02/17/2022 Trace  None Seen, Trace /HPF Final   • Squamous Epithelial Cells, UA 02/17/2022 3-6* None Seen, 0-2 /HPF Final   • Hyaline Casts, UA 02/17/2022 0-6  0 - 6 /LPF Final   • Calcium Oxalate Crystals, UA 02/17/2022 Small/1+  None Seen /HPF Final   • Amorphous Crystals, UA 02/17/2022 Small/1+  None Seen /HPF Final   • Methodology 02/17/2022 Manual Light Microscopy   Final   • Heparin Anti-Xa (UFH) 02/17/2022 >1.10* 0.30 - 0.70 IU/ml Final   • Heparin Anti-Xa (UFH) 02/17/2022 >1.10* 0.30 - 0.70 IU/ml Final   • Product Code 02/18/2022 [id number]   Final   • Unit Number 02/18/2022 [id number]   Final   • UNIT  ABO 02/18/2022 O   Final   • UNIT  RH 02/18/2022 POS   Final   • Crossmatch Interpretation 02/18/2022 Compatible   Final   • Dispense Status 02/18/2022 PT   Final   • Blood Expiration Date 02/18/2022 [id number]   Final   • Blood Type Barcode 02/18/2022 5100   Final   • Hemoglobin 02/17/2022 8.3* 12.0 - 15.9 g/dL Final   • Hematocrit 02/17/2022 24.2* 34.0 - 46.6 % Final   • Hemoglobin 02/18/2022 8.4* 12.0 - 15.9 g/dL Final   • Hematocrit 02/18/2022 25.3* 34.0 - 46.6 % Final   • Hemoglobin 02/18/2022 9.4* 12.0 - 15.9 g/dL Final   • Hematocrit 02/18/2022 27.6* 34.0 - 46.6 % Final       No results found.    ASSESSMENT: The patient is a very pleasant 76 y.o. female  with diffuse large B-cell lymphoma      PLAN:  1.  Diffuse large B-cell lymphoma: Patient status post three cycles of chemotherapy with excellent clinical response.  She was scheduled for repeat PET scan next week however the patient family decided to move along with comfort measures alone.  I do think the patient will be hospice candidate since I do not think her survival is less than 6 months.  The patient is interested in keeping her PET scan as scheduled and follow-up with me to go over the results and then she  might decide whether she will want to continue with treatment or not.  2.  Anemia: Good response to blood transfusion hemoglobin is up to 9.4.  3.  Weakness: Patient will benefit from physical therapy.  4.  Covid pneumonia: Patient clinically doing good oxygen saturation is normal she is not on any oxygen supplement.  5.  Bilateral small PEs: Agree with resuming anticoagulation as an inpatient and monitoring her hemoglobin.      Adelaida Al MD  2/18/2022

## 2022-02-19 ENCOUNTER — READMISSION MANAGEMENT (OUTPATIENT)
Dept: CALL CENTER | Facility: HOSPITAL | Age: 77
End: 2022-02-19

## 2022-02-19 VITALS
WEIGHT: 127.7 LBS | SYSTOLIC BLOOD PRESSURE: 116 MMHG | DIASTOLIC BLOOD PRESSURE: 69 MMHG | HEART RATE: 93 BPM | OXYGEN SATURATION: 96 % | TEMPERATURE: 98.4 F | RESPIRATION RATE: 16 BRPM | HEIGHT: 65 IN | BODY MASS INDEX: 21.27 KG/M2

## 2022-02-19 PROBLEM — J12.82 PNEUMONIA DUE TO COVID-19 VIRUS: Status: RESOLVED | Noted: 2022-02-17 | Resolved: 2022-02-19

## 2022-02-19 PROBLEM — E44.0 MODERATE MALNUTRITION: Status: ACTIVE | Noted: 2022-02-19

## 2022-02-19 PROBLEM — U07.1 PNEUMONIA DUE TO COVID-19 VIRUS: Status: RESOLVED | Noted: 2022-02-17 | Resolved: 2022-02-19

## 2022-02-19 PROBLEM — K62.5 BRBPR (BRIGHT RED BLOOD PER RECTUM): Status: RESOLVED | Noted: 2022-02-16 | Resolved: 2022-02-19

## 2022-02-19 PROBLEM — E87.20 LACTIC ACIDOSIS: Status: RESOLVED | Noted: 2022-02-16 | Resolved: 2022-02-19

## 2022-02-19 PROCEDURE — 99239 HOSP IP/OBS DSCHRG MGMT >30: CPT | Performed by: INTERNAL MEDICINE

## 2022-02-19 PROCEDURE — 63710000001 DEXAMETHASONE PER 0.25 MG: Performed by: INTERNAL MEDICINE

## 2022-02-19 PROCEDURE — 94799 UNLISTED PULMONARY SVC/PX: CPT

## 2022-02-19 RX ORDER — HYDROCORTISONE ACETATE 25 MG/1
25 SUPPOSITORY RECTAL 2 TIMES DAILY
Qty: 12 EACH | Refills: 0 | Status: SHIPPED | OUTPATIENT
Start: 2022-02-19

## 2022-02-19 RX ORDER — DEXAMETHASONE 6 MG/1
6 TABLET ORAL
Qty: 2 TABLET | Refills: 0 | Status: SHIPPED | OUTPATIENT
Start: 2022-02-20 | End: 2022-02-22

## 2022-02-19 RX ADMIN — SODIUM CHLORIDE, PRESERVATIVE FREE 10 ML: 5 INJECTION INTRAVENOUS at 09:06

## 2022-02-19 RX ADMIN — SENNOSIDES AND DOCUSATE SODIUM 2 TABLET: 50; 8.6 TABLET ORAL at 09:05

## 2022-02-19 RX ADMIN — METOPROLOL SUCCINATE 25 MG: 25 TABLET, FILM COATED, EXTENDED RELEASE ORAL at 09:05

## 2022-02-19 RX ADMIN — DEXAMETHASONE 6 MG: 2 TABLET ORAL at 09:04

## 2022-02-19 RX ADMIN — ALLOPURINOL 300 MG: 300 TABLET ORAL at 09:05

## 2022-02-19 RX ADMIN — APIXABAN 5 MG: 5 TABLET, FILM COATED ORAL at 09:04

## 2022-02-19 RX ADMIN — ALBUTEROL SULFATE 2 PUFF: 90 AEROSOL, METERED RESPIRATORY (INHALATION) at 08:44

## 2022-02-19 RX ADMIN — SODIUM CHLORIDE, PRESERVATIVE FREE 10 ML: 5 INJECTION INTRAVENOUS at 09:08

## 2022-02-19 RX ADMIN — ASPIRIN 81 MG 81 MG: 81 TABLET ORAL at 09:05

## 2022-02-19 NOTE — CASE MANAGEMENT/SOCIAL WORK
Case Management Discharge Note      Final Note: Patient has orders for discharge.  Plans per hospice documentation is home with Hospice today.  All services, equipment and transportation arranged by Hospice.  Patient plan is to discharge home with Hospice today via Ambulance for transport scheduld for 1400.         Selected Continued Care - Admitted Since 2/16/2022     Destination    No services have been selected for the patient.              Durable Medical Equipment    No services have been selected for the patient.              Dialysis/Infusion    No services have been selected for the patient.              Home Medical Care    No services have been selected for the patient.              Therapy    No services have been selected for the patient.              Community Resources    No services have been selected for the patient.              Community & DME    No services have been selected for the patient.                Selected Continued Care - Prior Encounters Includes selections from prior encounters from 11/18/2021 to 2/19/2022    Discharged on 2/13/2022 Admission date: 2/11/2022 - Discharge disposition: Home-Health Care c    Home Medical Care     Service Provider Selected Services Address Phone Fax Patient Preferred    [facility]  Home Health Services [address] [facility] [address] [phone] --                         Final Discharge Disposition Code: 50 - home with hospice

## 2022-02-19 NOTE — OUTREACH NOTE
Prep Survey      Responses   [facility] facility patient discharged from? [facility]   Is LACE score < 7 ? No   Emergency Room discharge w/ pulse ox? No   Eligibility Not Eligible   What are the reasons patient is not eligible? Hospice/Pallative Care   Does the patient have one of the following disease processes/diagnoses(primary or secondary)? Other   Prep survey completed? Yes          Arlene Messer RN

## 2022-02-19 NOTE — DISCHARGE SUMMARY
[facility] Medicine Services  DISCHARGE SUMMARY    Patient Name: Vale Cee  : 1945  MRN: [id number]    Date of Admission: 2022 10:20 AM  Date of Discharge: 2022  Primary Care Physician: Sarah Redman APRN    Consults     Date and Time Order Name Status Description    2022 12:34 AM Inpatient Hematology & Oncology Consult Completed     2022 12:34 AM Inpatient Palliative Care MD Consult Completed     2022 12:34 AM Inpatient Gastroenterology Consult Completed           Hospital Course     Presenting Problem:   Symptomatic anemia [D64.9]  Pneumonia due to COVID-19 virus [U07.1, J12.82]    Active Hospital Problems    Diagnosis  POA   • Moderate malnutrition (CMS/HCC) [E44.0]  Yes   • Symptomatic anemia [D64.9]  Yes   • Pulmonary embolism (HCC) [I26.99]  Yes   • 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) [C83.33]  Yes      Resolved Hospital Problems    Diagnosis Date Resolved POA   • Pneumonia due to COVID-19 virus [U07.1, J12.82] 2022 Yes   • BRBPR (bright red blood per rectum) [K62.5] 2022 Yes   • Lactic acidosis [E87.2] 2022 Yes          Hospital Course:  Vale Cee is a 76 y.o. female with history of diffuse large B-cell lymphoma on R-CHOP, recent Covid infection, PE who presents with weakness.  Patient was recently admitted to the hospital from 2022 to 2022 for severe bilateral Covid pneumonia and an acute PE.      Failure to thrive  -This occurred in setting of patient recently being discharged from the hospital after being treated for COVID 19. Upon arrival she was treated with IVF to which she ultimately responded well.   -PT/OT worked with patient throughout stay and recommended d/c to SNF however patient/family elected instead to go home with hospice.     Hemorrhoidal bleeding  -Patient also noted to have bright red blood per rectum during stay in setting of acute PE.  -She received 1 unit  PRBCs with improvement and stabilization of her Hb.   -My partner discussed discussed with GI, they recommended Anusol suppositories for 5 days which seemed to have resolved the issue.  She was placed back on Eliqus in hospital w/out further evidence of bleeding and with stabilization of her Hb. My partner also d/w her son who was agreeable to this plan    Recent Covid  -Patient was discharged with 5 days of dexamethasone.  Will continue with an additional 3 days of dexamethasone to complete her 10-day course.  -Currently on room air  -She can be removed from precautions 2/20/2022     PE  -Continue PO Eliquis at d/c.     Diffuse large B-cell lymphoma  - Dr. Al followed during stay. He recommended proceeding with PET scan as outpatient and having patient follow up with him in 2 weeks time. At that time they can have further discussion as to whether she will be a candidate for further chemotherapy given her functional status per his notes.    Discharge Follow Up Recommendations for outpatient labs/diagnostics:   PCP in 1-2 weeks   Dr. Al in 2 weeks following PET    Day of Discharge     HPI: Up in bed. Didn't sleep well but says it was mainly because she couldn't get comfortable. Looking forward to going home.     Review of Systems  Gen- No fevers, chills  CV- No chest pain, palpitations  Resp- No cough, dyspnea  GI- No N/V/D, abd pain    Vital Signs:   Temp:  [98.4 °F (36.9 °C)-99.1 °F (37.3 °C)] 98.7 °F (37.1 °C)  Heart Rate:  [78-90] 81  Resp:  [16-18] 16  BP: (111-126)/(64-77) 113/72      Physical Exam:  With patient's consent, physical exam was conducted via visual telemedicine encounter due to patient's current isolation requirements in the interest of PPE conservation.    Constitutional: No acute distress, awake, alert, nontoxic, normal body habitus, frail appearing  HENT: NCAT, MMM, no conjunctival injection  Respiratory: Good effort, nonlabored respirations   Cardiovascular:  tele with  NSR  Musculoskeletal: No edema, normal muscle tone and mass for age  Psychiatric: Appropriate affect, good insight and judgement, cooperative  Neurologic: Oriented x 3, movements symmetric BUE and BLE, speech clear and fluent  Skin: No visible rashes, no jaundice seen on exposed skin through window      Pertinent  and/or Most Recent Results     LAB RESULTS:      Lab 02/18/22  1050 02/18/22  0439 02/17/22  2317 02/17/22  1219 02/17/22  0742 02/17/22  0415 02/17/22  0415 02/16/22 2024 02/16/22  1047 02/16/22  1047 02/13/22  0931 02/13/22  0931   WBC  --   --   --   --   --   --  10.91*  --   --  18.18*  --  8.63   HEMOGLOBIN 9.4* 8.4* 8.3* 7.8* 7.6*   < > 7.8*  --    < > 7.0*   < > 9.2*   HEMATOCRIT 27.6* 25.3* 24.2* 23.0* 22.5*   < > 23.6*  --    < > 21.2*   < > 28.9*   PLATELETS  --   --   --   --   --   --  156  --   --  199  --  222   NEUTROS ABS  --   --   --   --   --   --  9.15*  --   --  15.62*  --  7.20*   IMMATURE GRANS (ABS)  --   --   --   --   --   --  0.77*  --   --  1.06*  --  0.53*   LYMPHS ABS  --   --   --   --   --   --  0.49*  --   --  0.64*  --  0.41*   MONOS ABS  --   --   --   --   --   --  0.48  --   --  0.82  --  0.45   EOS ABS  --   --   --   --   --   --  0.00  --   --  0.00  --  0.00   MCV  --   --   --   --   --   --  97.9*  --   --  101.0*  --  101.4*   CRP  --   --   --   --   --   --   --   --   --   --   --  1.34*   PROCALCITONIN  --   --   --   --   --   --   --   --   --  0.11  --   --    LACTATE  --   --   --   --   --   --   --  1.5  --  2.4*  --   --    PROTIME  --   --   --   --   --   --  21.5*  --   --  23.7*  --   --    APTT  --   --   --   --   --   --  25.6  --   --   --   --   --    HEPARIN ANTI-XA  --   --  >1.10*  --  >1.10*  --  >1.10*  --   --   --   --   --     < > = values in this interval not displayed.         Lab 02/17/22  0415 02/16/22  1047 02/13/22  0931   SODIUM 140 140 141   POTASSIUM 4.1 3.8 3.9   CHLORIDE 108* 107 107   CO2 23.0 22.0 24.0   ANION GAP 9.0  11.0 10.0   BUN 42* 44* 27*   CREATININE 0.71 0.77 0.84   GLUCOSE 113* 128* 110*   CALCIUM 8.1* 8.5* 8.7   MAGNESIUM  --  2.2  --    TSH  --  1.010  --          Lab 02/16/22  1047 02/13/22  0931   TOTAL PROTEIN 5.0* 5.5*   ALBUMIN 2.90* 3.10*   GLOBULIN 2.1 2.4   ALT (SGPT) 16 9   AST (SGOT) 20 13   BILIRUBIN 0.3 0.3   ALK PHOS 46 56         Lab 02/17/22  0415 02/16/22  1047   PROBNP  --  1,521.0   TROPONIN T  --  0.030   PROTIME 21.5* 23.7*   INR 1.96* 2.21*             Lab 02/16/22  1311   ABO TYPING O   RH TYPING Positive   ANTIBODY SCREEN Negative         Brief Urine Lab Results  (Last result in the past 365 days)      Color   Clarity   Blood   Leuk Est   Nitrite   Protein   CREAT   Urine HCG        02/17/22 0400 Yellow   Cloudy   Small (1+)   Trace   Negative   100 mg/dL (2+)               Microbiology Results (last 10 days)     Procedure Component Value - Date/Time    Blood Culture - Blood, Arm, Right [[id number]]  (Normal) Collected: 02/16/22 1230    Lab Status: Preliminary result Specimen: Blood from Arm, Right Updated: 02/18/22 1315     Blood Culture No growth at 2 days    Blood Culture - Blood, Arm, Left [[id number]]  (Normal) Collected: 02/16/22 1200    Lab Status: Preliminary result Specimen: Blood from Arm, Left Updated: 02/18/22 1315     Blood Culture No growth at 2 days    Blood Culture - Blood, Hand, Right [[id number]]  (Normal) Collected: 02/11/22 1640    Lab Status: Final result Specimen: Blood from Hand, Right Updated: 02/16/22 1700     Blood Culture No growth at 5 days    Blood Culture - Blood, Chest, Left [[id number]]  (Normal) Collected: 02/11/22 1600    Lab Status: Final result Specimen: Blood from Chest, Left Updated: 02/16/22 1700     Blood Culture No growth at 5 days          Adult Transthoracic Echo Complete w/ Color, Spectral and Contrast if necessary per protocol    Result Date: 2/12/2022  · Normal LV systolic function · No significant valvular abnormality      CT Head Without  Contrast    Result Date: 2/16/2022  EXAMINATION: CT HEAD WO CONTRAST-  INDICATION: Generalized weakness  TECHNIQUE: Axial noncontrast CT of the head with multiplanar reconstruction  The radiation dose reduction device was turned on for each scan per the ALARA (As Low as Reasonably Achievable) protocol.  COMPARISON: 11/18/2021  FINDINGS: Gray-white differentiation is maintained and there is no evidence of intracranial hemorrhage, mass or mass effect. Age-related changes of the brain are present including volume loss and typical periventricular sequela of chronic small vessel ischemia. There is otherwise no evidence of intracranial hemorrhage, mass or mass effect. The ventricles are normal in size and configuration accounting for surrounding volume loss. The orbits are normal. There is fluid opacification including aerated secretions noted in the left maxillary sinus and left sphenoid chamber.      Age-related changes of the brain as above, otherwise without evidence of acute intracranial abnormality.  Aerated secretions and mucosal thickening in the left sphenoid chamber and left maxillary sinus, consistent with acute sinusitis in the correct clinical setting.   This report was finalized on 2/16/2022 1:25 PM by Kavin Sandhu.      XR Chest 1 View    Result Date: 2/16/2022  DATE OF EXAM: 2/16/2022 10:35 AM  PROCEDURE: XR CHEST 1 VW-  INDICATIONS: SOA triage protocol  COMPARISON: CT chest February 11, 2022  TECHNIQUE: Single radiographic AP view of the chest was obtained.  FINDINGS: A port catheter is noted with its tip in the superior vena cava. A cardiac recording device is seen overlying the left chest. There are bibasilar densities which could relate to atelectasis or sequela of more recent inflammatory process. There are some vague interstitial changes at least more peripherally within the right lung. There are no definite pleural effusions. It looks like there is an element of COPD.      1.  There are some  peripheral vague densities within the right lung in addition bibasilar areas of density. This could reflect sequela to recent Covid pneumonia. Some atelectasis might also account for at least some of the appearance to the basilar areas.  This report was finalized on 2/16/2022 10:45 AM by Damaso Dutta MD.      CT Angiogram Chest    Result Date: 2/16/2022  EXAMINATION: CT ANGIOGRAM CHEST-  INDICATION: SOB  TECHNIQUE: Axial pulmonary arterial phase IV contrast enhanced CT angiogram of the chest per PE protocol. Two-dimensional reconstructions were postprocessed.  The radiation dose reduction device was turned on for each scan per the ALARA (As Low as Reasonably Achievable) protocol.  COMPARISON: 2/11/2022  FINDINGS: No pathologic axillary adenopathy or other worrisome body wall soft tissue findings in the chest. No acute findings in the partially imaged upper abdomen. No pleural or pericardial effusion. No pathologic mediastinal or hilar lymphadenopathy. Nonaneurysmal mildly atherosclerotic thoracic aorta. Evaluation of the osseous structures demonstrates no evidence of acute fracture or aggressive osseous lesion, with redemonstrated spondylosis and exaggerated kyphosis. There is redemonstration of a filling defect within the branching left lower lobe segmental pulmonary artery branch, unchanged from recent comparison. No new or more extensive coronary emboli evident. Evaluation of the lung fields redemonstrates peripheral predominant areas of groundglass opacity and reticulation compatible with Covid 19 related pneumonia.      Unchanged small filling defect within the left lower lobe segmental branch compatible with small pulmonary embolus. There is no evidence of new or progressive pulmonary emboli. No evidence of right heart strain.  Redemonstrated findings of likely Covid 19 related pneumonia with extensive peripheral reticulation and groundglass opacity.   This report was finalized on 2/16/2022 1:28 PM by Kavin  Edson.      CT Angiogram Chest    Result Date: 2/11/2022  EXAMINATION: CT ANGIOGRAM CHEST-  INDICATION: Dyspnea, cancer, hypoxia  TECHNIQUE: 3 mm post IV contrast images through the chest with sagittal and coronal 2-D reconstructions. A total of 84 mL of Isovue-370 was administered.  The radiation dose reduction device was turned on for each scan per the ALARA (As Low as Reasonably Achievable) protocol.  COMPARISON: Whole-body PET/CT scan of 11/30/2021  FINDINGS: Patient history indicates dyspnea and hypoxia. History of diffuse large B-cell lymphoma.  Peripheral multifocal lung disease of the mid and lower lungs is in a distribution typically associated with Covid 19 pneumonia, and individual lesions generally resemble Covid 19 as well. No effusion or lung consolidation is seen. There is moderate upper lobe bullous change.  There is good contrast opacification of the pulmonary arteries with no evidence of large or central pulmonary emboli. Axial image 56 through 61 shows a small slender embolus in a subsegmental vessel of the left lower lobe. No peripheral emboli are identified elsewhere. Thoracic aorta is normal in caliber with no evidence of dissection. No mediastinal adenopathy or pericardial effusion is appreciated. There is no evidence of right heart strain. There appears to be moderate coronary artery calcification. There appears to be an implanted loop recorder.  Included images of the upper abdomen show fatty liver change. Spleen is not enlarged. Included upper renal poles pancreatitis glands appear unremarkable. Bony structures appear to be intact.        1. Extensive peripheral pulmonary interstitial disease, in a pattern typical for Covid 19 pneumonia. 2. Single small peripheral left lower lobe pulmonary embolus. No right heart strain. 3. No evidence of other pulmonary embolic disease or other acute disease elsewhere.     Note: Preliminary findings were discussed with Dr. Cannon at approximately 6:15 PM  2/11/2022.  This report was finalized on 2/11/2022 6:18 PM by Dr. Navid Lazo MD.                Results for orders placed during the hospital encounter of 02/11/22    Adult Transthoracic Echo Complete w/ Color, Spectral and Contrast if necessary per protocol    Interpretation Summary  · Normal LV systolic function  · No significant valvular abnormality      Discharge Details        Discharge Medications      New Medications      Instructions Start Date   hydrocortisone 25 MG suppository  Commonly known as: ANUSOL-HC   25 mg, Rectal, 2 Times Daily      morphine 20 mg/mL solution concentrated solution   5 mg, Oral, Every 3 Hours PRN         Continue These Medications      Instructions Start Date   acetaminophen 500 MG tablet  Commonly known as: TYLENOL   500 mg, Oral, Every 6 Hours PRN      albuterol sulfate  (90 Base) MCG/ACT inhaler  Commonly known as: PROVENTIL HFA;VENTOLIN HFA;PROAIR HFA   2 puffs, Inhalation, 4 Times Daily - RT      allopurinol 300 MG tablet  Commonly known as: Zyloprim   300 mg, Oral, Daily      Aspirin 81 81 MG chewable tablet  Generic drug: aspirin   Aspir-81      dexamethasone 6 MG tablet  Commonly known as: DECADRON   6 mg, Oral, Daily With Breakfast   Start Date: February 20, 2022     Eliquis DVT/PE Starter Pack tablet therapy pack  Generic drug: Apixaban Starter Pack   5 mg, Oral, Take As Directed      lidocaine-prilocaine 2.5-2.5 % cream  Commonly known as: EMLA   1 application, Topical, As Needed      megestrol 40 MG/ML suspension  Commonly known as: MEGACE   TAKE 20 ML BY MOUTH  ONCE DAILY      metoprolol succinate XL 25 MG 24 hr tablet  Commonly known as: TOPROL-XL   1 tablet, Oral, Daily      mirtazapine 15 MG tablet  Commonly known as: REMERON   15 mg, Oral, Nightly      nystatin 100,000 unit/mL suspension  Commonly known as: MYCOSTATIN   500,000 Units, Swish & Swallow, 4 Times Daily      ondansetron 8 MG tablet  Commonly known as: ZOFRAN   8 mg, Oral, 3 Times Daily PRN       OTEZLA PO   30 mg      potassium chloride 10 MEQ CR tablet   No dose, route, or frequency recorded.      potassium chloride 20 MEQ CR tablet  Commonly known as: K-DUR,KLOR-CON   20 mEq, Oral, Daily      rosuvastatin 20 MG tablet  Commonly known as: CRESTOR   20 mg, Oral         Stop These Medications    DUTOPROL PO     nitrofurantoin (macrocrystal-monohydrate) 100 MG capsule  Commonly known as: MACROBID     ondansetron ODT 4 MG disintegrating tablet  Commonly known as: Zofran ODT     predniSONE 20 MG tablet  Commonly known as: DELTASONE            Allergies   Allergen Reactions   • Codeine Nausea And Vomiting   • Penicillins Swelling         Discharge Disposition:  Home or Self Care    Diet:  Hospital:  Diet Order   Procedures   • Diet Regular       Restrictions or Other Recommendations:  • (+) positive COVID-19 test result  • The majority of patients with a positive test result will recover, symptom severity is variable among those infected.  • Certain comorbidities such as COPD/asthma, DM, CAD and others can increase the risk of more severe illness.  • For this reason, Vale Cee strongly encouraged to practice the safest standards in protecting their health and others given the endemic concerns.  Patient was advised to:  o  Practice social distancing including limiting contact with family (even in the same home) and friends as much as possible for at least 20 days.  o Implement 20 day home self-quarantining, with the exception of seeking required or essential medical care.  o Continue to wash their hands frequently with soap and hot water, and cover their cough.   • For support of non-emergent symptoms expected with this virus such as increased shortness of breath, fever, cough, or other questions, Vale Cee was asked to please call their primary care physician’s office or the [place] hotline at [phone].  • Vale Cee was advised to seek care in the Emergency Department should extreme symptoms  arise such as new onset confusion, extreme lethargy, hypoxia, or new hypotension.  • Questions were engaged and answered to the best of my ability, patient expressed verbal understanding of their test results and my advice.         CODE STATUS:    Code Status and Medical Interventions:   Ordered at: 02/18/22 1323     Code Status (Patient has no pulse and is not breathing):    No CPR (Do Not Attempt to Resuscitate)     Medical Interventions (Patient has pulse or is breathing):    Comfort Measures       Future Appointments   Date Time Provider Department Center   2/28/2022 11:30 AM [facility] PET ADMIN RM1  [facility] PETCT [facility]   2/28/2022 12:30 PM SHE[facility] [place] PETCT 1  [facility] PETCT [facility]   3/1/2022  9:00 AM Adelaida Al MD [facility] ONC SHE[facility]   3/1/2022  9:30 AM CHAIR 4  SHELL OPI [facility]       Additional Instructions for the Follow-ups that You Need to Schedule     Discharge Follow-up with PCP   As directed       Currently Documented PCP:    Sarah Redman APRN    PCP Phone Number:    [phone]     Follow Up Details: 1-2 week hospital follow up         Discharge Follow-up with Specialty: Dr. Al; 2 Weeks   As directed      Specialty: Dr. Al    Follow Up: 2 Weeks                     Preethi Mackenzie II, DO  02/19/22      Time Spent on Discharge:  I spent 34  minutes on this discharge activity which included: face-to-face encounter with the patient, reviewing the data in the system, coordination of the care with the nursing staff as well as consultants, documentation, and entering orders.

## 2022-02-19 NOTE — PROGRESS NOTES
"                  Clinical Nutrition     Nutrition Assessment  Reason for Visit:   Identified at risk by screening criteria, MST score 2+, Malnutrition Severity Assessment limited per covid protocol       Patient Name: Vale Cee  YOB: 1945  MRN: [id number]  Date of Encounter: 02/18/22 19:57 EST  Admission date: 2/16/2022      Comments: Pt meets criteria for acute non severe malnutrition based on wt intake hx.   See flowsheet note. Note plan home w hospice.Plan for colonoscopy cancelled.    Admission Diagnosis    Symptomatic anemia [D64.9]  Pneumonia due to COVID-19 virus [U07.1, J12.82]     Hospital Problem List    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)    Pulmonary embolism (HCC)    Symptomatic anemia    BRBPR (bright red blood per rectum)    Lactic acidosis        Applicable PMH/PSxH:     PMH: She  has a past medical history of Cancer (HCC), CVA (cerebral vascular accident) (HCC), Fatigue, Hypertension, and Weight loss, abnormal.   PSxH: She  has a past surgical history that includes Cardiac surgery and Cataract extraction.         Diet/Nutrition Related History:     Rpt 2 weeks < 50% usual intake w wt loss. Allows ate well on megace prior to current illness.    Anthropometrics     Admission Height 165.1 cm (65\") Documented at 02/16/2022 1024   Admission Weight 58.1 kg (128 lb) Documented at 02/16/2022 1024        Height: 165.1 cm (65\")    Last filed wt: Weight: 57.9 kg (127 lb 11.2 oz) (02/17/22 0446)  Weight Method: Bed scale    BMI: BMI (Calculated): 21.3  Normal: 18.5-24.9kg/m2    Ideal Body Weight (IBW) (kg): 57.29      Weight Change   UBW:135 lbs rpt per EMR on 1/17/21  Weight change:7 lbs compared to standing wt of 128 lbs on 2/12   % wt change: 5%  Time frame of weight loss:1 mo     Labs reviewed   Yes      Medications reviewed   Yes  Allopurinol, Decadron, Remeron, Pericolace   (Note had been on megace prior to covid illness)    Intake/Ouptut 24 hrs reviewed "   Yes        Nutrition Focused Physical Exam     Unable to perform exam due to: per covid protocol  Pt meets criteria for acute non severe malnutrition based on wt intake hx.See flowsheet note.       Current Nutrition Prescription     PO: Diet Regular  Orders Placed This Encounter      Dietary Nutrition Supplements Boost Plus; strawberry  daily added per RD    Intake: insuffic data      Nutrition Diagnosis     2/18  Problem Malnutrition  moderate acute   Etiology Effects of covid   Signs/Symptoms Intake < 50% 2 wks w loss 5% body wt.   Status:      Goal:   General: Nutrition to support treatment  PO: Establish PO  Additional goals:      Nutrition Intervention     Follow treatment progress, Care plan reviewed, Advise alternate selection, Menu provided, Supplement provided Menu provided via RN      Monitoring/Evaluation:   Per protocol, PO intake, Supplement intake, Pertinent labs, Weight, Symptoms as appropriate.         Lupe Rajan RD,   Time Spent: 25 min

## 2022-02-19 NOTE — PROGRESS NOTES
Malnutrition Severity Assessment    Patient Name:  Vale Cee  YOB: 1945  MRN: [id number]  Admit Date:  2/16/2022    Patient meets criteria for : Moderate (non-severe) Malnutrition (Pt meets criteria for acute non severe malnutrition based on wt intake hx.)    Comments:      Malnutrition Severity Assessment  Malnutrition Type: Acute Disease or Injury - Related Malnutrition  Malnutrition Type (last 8 hours)     Malnutrition Severity Assessment     Row Name 02/18/22 1953       Malnutrition Severity Assessment    Malnutrition Type Acute Disease or Injury - Related Malnutrition    Row Name 02/18/22 1953       Insufficient Energy Intake     Insufficient Energy Intake Findings Severe    Insufficient Energy Intake  < or equal to 50% of est. energy requirement for > or equal to 5d)    Row Name 02/18/22 1953       Unintentional Weight Loss     Unintentional Weight Loss Findings Moderate    Unintentional Weight Loss  Weight loss of 5% in one month    Row Name 02/18/22 1953       Muscle Loss    Loss of Muscle Mass Findings --  unable to assess per covid protocol    Row Name 02/18/22 1953       Fat Loss    Subcutaneous Fat Loss Findings --  unable to assess per covid protocol    Row Name 02/18/22 1953       Criteria Met (Must meet criteria for severity in at least 2 of these categories: M Wasting, Fat Loss, Fluid, Secondary Signs, Wt. Status, Intake)    Patient meets criteria for  Moderate (non-severe) Malnutrition  Pt meets criteria for acute non severe malnutrition based on wt intake hx.                Electronically signed by:  Lupe Rajan RD  02/18/22 20:07 EST

## 2022-02-21 LAB
BACTERIA SPEC AEROBE CULT: NORMAL
BACTERIA SPEC AEROBE CULT: NORMAL

## 2022-02-21 NOTE — DISCHARGE PLACEMENT REQUEST
"Vale Lizarraga (76 y.o. Female)   D/C Summary            Date of Birth Social Security Number Address Home Phone MRN    1945  [facility] [place], [place]. \Hospital Has a New Name and Outlook.\"" [id number] [phone] [id number]    Zoroastrianism Marital Status             Presbyterian        Admission Date Admission Type Admitting Provider Attending Provider Department, Room/Bed    2/16/22 Emergency Preethi Mackenzie II DO  [facility] 6A, N617/1    Discharge Date Discharge Disposition Discharge Destination          2/19/2022 Home or Self Care              Attending Provider: (none)   Allergies: Codeine, Penicillins    Isolation: None   Infection: COVID (confirmed) (02/12/22)   Code Status: Prior   Advance Care Planning Activity    Ht: 165.1 cm (65\")   Wt: 57.9 kg (127 lb 11.2 oz)    Admission Cmt: None   Principal Problem: None                Active Insurance as of 2/16/2022     Primary Coverage     Payor Plan Insurance Group Employer/Plan Group    MEDICARE MEDICARE A & B      Payor Plan Address Payor Plan Phone Number Payor Plan Fax Number Effective Dates    [address] [phone]  9/1/2008 - None Entered    [facility] [id number]       Subscriber Name Subscriber Birth Date Member ID       VALE LIZARRAGA 1945 [id number]           Secondary Coverage     Payor Plan Insurance Group Employer/Plan Group    [employer]                Payor Plan Address Payor Plan Phone Number Payor Plan Fax Number Effective Dates    [address] [address]   11/1/2017 - None Entered    [facility] [id number]       Subscriber Name Subscriber Birth Date Member ID       YOSELIN LIZARRAGAE EDWIGE 1945 [id number]                 Emergency Contacts      (Rel.) Home Phone Work Phone Mobile Phone    CRISTHIAN LIZARRAGA (Son) [phone] -- [phone]    Louis Lizarraga (Son) -- -- [phone]    Kishore Lizarraga (Son) -- -- [phone]                 Discharge Summary      Preethi Mackenzie II, DO at 02/19/22 0743              [facility] " Hospital Medicine Services  DISCHARGE SUMMARY    Patient Name: Vale Cee  : 1945  MRN: [id number]    Date of Admission: 2022 10:20 AM  Date of Discharge: 2022  Primary Care Physician: Sarah Redman APRN    Consults     Date and Time Order Name Status Description    2022 12:34 AM Inpatient Hematology & Oncology Consult Completed     2022 12:34 AM Inpatient Palliative Care MD Consult Completed     2022 12:34 AM Inpatient Gastroenterology Consult Completed           Hospital Course     Presenting Problem:   Symptomatic anemia [D64.9]  Pneumonia due to COVID-19 virus [U07.1, J12.82]    Active Hospital Problems    Diagnosis  POA   • Moderate malnutrition (CMS/HCC) [E44.0]  Yes   • Symptomatic anemia [D64.9]  Yes   • Pulmonary embolism (HCC) [I26.99]  Yes   • 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) [C83.33]  Yes      Resolved Hospital Problems    Diagnosis Date Resolved POA   • Pneumonia due to COVID-19 virus [U07.1, J12.82] 2022 Yes   • BRBPR (bright red blood per rectum) [K62.5] 2022 Yes   • Lactic acidosis [E87.2] 2022 Yes          Hospital Course:  Vale Cee is a 76 y.o. female with history of diffuse large B-cell lymphoma on R-CHOP, recent Covid infection, PE who presents with weakness.  Patient was recently admitted to the hospital from 2022 to 2022 for severe bilateral Covid pneumonia and an acute PE.      Failure to thrive  -This occurred in setting of patient recently being discharged from the hospital after being treated for COVID 19. Upon arrival she was treated with IVF to which she ultimately responded well.   -PT/OT worked with patient throughout stay and recommended d/c to SNF however patient/family elected instead to go home with hospice.     Hemorrhoidal bleeding  -Patient also noted to have bright red blood per rectum during stay in setting of acute PE.  -She received 1 unit PRBCs with improvement and  stabilization of her Hb.   -My partner discussed discussed with GI, they recommended Anusol suppositories for 5 days which seemed to have resolved the issue.  She was placed back on Eliqus in hospital w/out further evidence of bleeding and with stabilization of her Hb. My partner also d/w her son who was agreeable to this plan    Recent Covid  -Patient was discharged with 5 days of dexamethasone.  Will continue with an additional 3 days of dexamethasone to complete her 10-day course.  -Currently on room air  -She can be removed from precautions 2/20/2022     PE  -Continue PO Eliquis at d/c.     Diffuse large B-cell lymphoma  - Dr. Al followed during stay. He recommended proceeding with PET scan as outpatient and having patient follow up with him in 2 weeks time. At that time they can have further discussion as to whether she will be a candidate for further chemotherapy given her functional status per his notes.    Discharge Follow Up Recommendations for outpatient labs/diagnostics:   PCP in 1-2 weeks   Dr. Al in 2 weeks following PET    Day of Discharge     HPI: Up in bed. Didn't sleep well but says it was mainly because she couldn't get comfortable. Looking forward to going home.     Review of Systems  Gen- No fevers, chills  CV- No chest pain, palpitations  Resp- No cough, dyspnea  GI- No N/V/D, abd pain    Vital Signs:   Temp:  [98.4 °F (36.9 °C)-99.1 °F (37.3 °C)] 98.7 °F (37.1 °C)  Heart Rate:  [78-90] 81  Resp:  [16-18] 16  BP: (111-126)/(64-77) 113/72      Physical Exam:  With patient's consent, physical exam was conducted via visual telemedicine encounter due to patient's current isolation requirements in the interest of PPE conservation.    Constitutional: No acute distress, awake, alert, nontoxic, normal body habitus, frail appearing  HENT: NCAT, MMM, no conjunctival injection  Respiratory: Good effort, nonlabored respirations   Cardiovascular:  tele with NSR  Musculoskeletal: No edema, normal muscle  tone and mass for age  Psychiatric: Appropriate affect, good insight and judgement, cooperative  Neurologic: Oriented x 3, movements symmetric BUE and BLE, speech clear and fluent  Skin: No visible rashes, no jaundice seen on exposed skin through window      Pertinent  and/or Most Recent Results     LAB RESULTS:      Lab 02/18/22  1050 02/18/22  0439 02/17/22  2317 02/17/22  1219 02/17/22  0742 02/17/22  0415 02/17/22  0415 02/16/22 2024 02/16/22  1047 02/16/22  1047 02/13/22  0931 02/13/22  0931   WBC  --   --   --   --   --   --  10.91*  --   --  18.18*  --  8.63   HEMOGLOBIN 9.4* 8.4* 8.3* 7.8* 7.6*   < > 7.8*  --    < > 7.0*   < > 9.2*   HEMATOCRIT 27.6* 25.3* 24.2* 23.0* 22.5*   < > 23.6*  --    < > 21.2*   < > 28.9*   PLATELETS  --   --   --   --   --   --  156  --   --  199  --  222   NEUTROS ABS  --   --   --   --   --   --  9.15*  --   --  15.62*  --  7.20*   IMMATURE GRANS (ABS)  --   --   --   --   --   --  0.77*  --   --  1.06*  --  0.53*   LYMPHS ABS  --   --   --   --   --   --  0.49*  --   --  0.64*  --  0.41*   MONOS ABS  --   --   --   --   --   --  0.48  --   --  0.82  --  0.45   EOS ABS  --   --   --   --   --   --  0.00  --   --  0.00  --  0.00   MCV  --   --   --   --   --   --  97.9*  --   --  101.0*  --  101.4*   CRP  --   --   --   --   --   --   --   --   --   --   --  1.34*   PROCALCITONIN  --   --   --   --   --   --   --   --   --  0.11  --   --    LACTATE  --   --   --   --   --   --   --  1.5  --  2.4*  --   --    PROTIME  --   --   --   --   --   --  21.5*  --   --  23.7*  --   --    APTT  --   --   --   --   --   --  25.6  --   --   --   --   --    HEPARIN ANTI-XA  --   --  >1.10*  --  >1.10*  --  >1.10*  --   --   --   --   --     < > = values in this interval not displayed.         Lab 02/17/22  0415 02/16/22  1047 02/13/22  0931   SODIUM 140 140 141   POTASSIUM 4.1 3.8 3.9   CHLORIDE 108* 107 107   CO2 23.0 22.0 24.0   ANION GAP 9.0 11.0 10.0   BUN 42* 44* 27*   CREATININE 0.71  0.77 0.84   GLUCOSE 113* 128* 110*   CALCIUM 8.1* 8.5* 8.7   MAGNESIUM  --  2.2  --    TSH  --  1.010  --          Lab 02/16/22  1047 02/13/22  0931   TOTAL PROTEIN 5.0* 5.5*   ALBUMIN 2.90* 3.10*   GLOBULIN 2.1 2.4   ALT (SGPT) 16 9   AST (SGOT) 20 13   BILIRUBIN 0.3 0.3   ALK PHOS 46 56         Lab 02/17/22  0415 02/16/22  1047   PROBNP  --  1,521.0   TROPONIN T  --  0.030   PROTIME 21.5* 23.7*   INR 1.96* 2.21*             Lab 02/16/22  1311   ABO TYPING O   RH TYPING Positive   ANTIBODY SCREEN Negative         Brief Urine Lab Results  (Last result in the past 365 days)      Color   Clarity   Blood   Leuk Est   Nitrite   Protein   CREAT   Urine HCG        02/17/22 0400 Yellow   Cloudy   Small (1+)   Trace   Negative   100 mg/dL (2+)               Microbiology Results (last 10 days)     Procedure Component Value - Date/Time    Blood Culture - Blood, Arm, Right [[id number]]  (Normal) Collected: 02/16/22 1230    Lab Status: Preliminary result Specimen: Blood from Arm, Right Updated: 02/18/22 1315     Blood Culture No growth at 2 days    Blood Culture - Blood, Arm, Left [[id number]]  (Normal) Collected: 02/16/22 1200    Lab Status: Preliminary result Specimen: Blood from Arm, Left Updated: 02/18/22 1315     Blood Culture No growth at 2 days    Blood Culture - Blood, Hand, Right [[id number]]  (Normal) Collected: 02/11/22 1640    Lab Status: Final result Specimen: Blood from Hand, Right Updated: 02/16/22 1700     Blood Culture No growth at 5 days    Blood Culture - Blood, Chest, Left [[id number]]  (Normal) Collected: 02/11/22 1600    Lab Status: Final result Specimen: Blood from Chest, Left Updated: 02/16/22 1700     Blood Culture No growth at 5 days          Adult Transthoracic Echo Complete w/ Color, Spectral and Contrast if necessary per protocol    Result Date: 2/12/2022  · Normal LV systolic function · No significant valvular abnormality      CT Head Without Contrast    Result Date: 2/16/2022  EXAMINATION: CT HEAD WO  CONTRAST-  INDICATION: Generalized weakness  TECHNIQUE: Axial noncontrast CT of the head with multiplanar reconstruction  The radiation dose reduction device was turned on for each scan per the ALARA (As Low as Reasonably Achievable) protocol.  COMPARISON: 11/18/2021  FINDINGS: Gray-white differentiation is maintained and there is no evidence of intracranial hemorrhage, mass or mass effect. Age-related changes of the brain are present including volume loss and typical periventricular sequela of chronic small vessel ischemia. There is otherwise no evidence of intracranial hemorrhage, mass or mass effect. The ventricles are normal in size and configuration accounting for surrounding volume loss. The orbits are normal. There is fluid opacification including aerated secretions noted in the left maxillary sinus and left sphenoid chamber.      Age-related changes of the brain as above, otherwise without evidence of acute intracranial abnormality.  Aerated secretions and mucosal thickening in the left sphenoid chamber and left maxillary sinus, consistent with acute sinusitis in the correct clinical setting.   This report was finalized on 2/16/2022 1:25 PM by Kavin Sandhu.      XR Chest 1 View    Result Date: 2/16/2022  DATE OF EXAM: 2/16/2022 10:35 AM  PROCEDURE: XR CHEST 1 VW-  INDICATIONS: SOA triage protocol  COMPARISON: CT chest February 11, 2022  TECHNIQUE: Single radiographic AP view of the chest was obtained.  FINDINGS: A port catheter is noted with its tip in the superior vena cava. A cardiac recording device is seen overlying the left chest. There are bibasilar densities which could relate to atelectasis or sequela of more recent inflammatory process. There are some vague interstitial changes at least more peripherally within the right lung. There are no definite pleural effusions. It looks like there is an element of COPD.      1.  There are some peripheral vague densities within the right lung in addition  bibasilar areas of density. This could reflect sequela to recent Covid pneumonia. Some atelectasis might also account for at least some of the appearance to the basilar areas.  This report was finalized on 2/16/2022 10:45 AM by Damaso Dutta MD.      CT Angiogram Chest    Result Date: 2/16/2022  EXAMINATION: CT ANGIOGRAM CHEST-  INDICATION: SOB  TECHNIQUE: Axial pulmonary arterial phase IV contrast enhanced CT angiogram of the chest per PE protocol. Two-dimensional reconstructions were postprocessed.  The radiation dose reduction device was turned on for each scan per the ALARA (As Low as Reasonably Achievable) protocol.  COMPARISON: 2/11/2022  FINDINGS: No pathologic axillary adenopathy or other worrisome body wall soft tissue findings in the chest. No acute findings in the partially imaged upper abdomen. No pleural or pericardial effusion. No pathologic mediastinal or hilar lymphadenopathy. Nonaneurysmal mildly atherosclerotic thoracic aorta. Evaluation of the osseous structures demonstrates no evidence of acute fracture or aggressive osseous lesion, with redemonstrated spondylosis and exaggerated kyphosis. There is redemonstration of a filling defect within the branching left lower lobe segmental pulmonary artery branch, unchanged from recent comparison. No new or more extensive coronary emboli evident. Evaluation of the lung fields redemonstrates peripheral predominant areas of groundglass opacity and reticulation compatible with Covid 19 related pneumonia.      Unchanged small filling defect within the left lower lobe segmental branch compatible with small pulmonary embolus. There is no evidence of new or progressive pulmonary emboli. No evidence of right heart strain.  Redemonstrated findings of likely Covid 19 related pneumonia with extensive peripheral reticulation and groundglass opacity.   This report was finalized on 2/16/2022 1:28 PM by Kavin Sandhu.      CT Angiogram Chest    Result Date:  2/11/2022  EXAMINATION: CT ANGIOGRAM CHEST-  INDICATION: Dyspnea, cancer, hypoxia  TECHNIQUE: 3 mm post IV contrast images through the chest with sagittal and coronal 2-D reconstructions. A total of 84 mL of Isovue-370 was administered.  The radiation dose reduction device was turned on for each scan per the ALARA (As Low as Reasonably Achievable) protocol.  COMPARISON: Whole-body PET/CT scan of 11/30/2021  FINDINGS: Patient history indicates dyspnea and hypoxia. History of diffuse large B-cell lymphoma.  Peripheral multifocal lung disease of the mid and lower lungs is in a distribution typically associated with Covid 19 pneumonia, and individual lesions generally resemble Covid 19 as well. No effusion or lung consolidation is seen. There is moderate upper lobe bullous change.  There is good contrast opacification of the pulmonary arteries with no evidence of large or central pulmonary emboli. Axial image 56 through 61 shows a small slender embolus in a subsegmental vessel of the left lower lobe. No peripheral emboli are identified elsewhere. Thoracic aorta is normal in caliber with no evidence of dissection. No mediastinal adenopathy or pericardial effusion is appreciated. There is no evidence of right heart strain. There appears to be moderate coronary artery calcification. There appears to be an implanted loop recorder.  Included images of the upper abdomen show fatty liver change. Spleen is not enlarged. Included upper renal poles pancreatitis glands appear unremarkable. Bony structures appear to be intact.        1. Extensive peripheral pulmonary interstitial disease, in a pattern typical for Covid 19 pneumonia. 2. Single small peripheral left lower lobe pulmonary embolus. No right heart strain. 3. No evidence of other pulmonary embolic disease or other acute disease elsewhere.     Note: Preliminary findings were discussed with Dr. Cannon at approximately 6:15 PM 2/11/2022.  This report was finalized on 2/11/2022  6:18 PM by Dr. Navid Lazo MD.                Results for orders placed during the hospital encounter of 02/11/22    Adult Transthoracic Echo Complete w/ Color, Spectral and Contrast if necessary per protocol    Interpretation Summary  · Normal LV systolic function  · No significant valvular abnormality      Discharge Details        Discharge Medications      New Medications      Instructions Start Date   hydrocortisone 25 MG suppository  Commonly known as: ANUSOL-HC   25 mg, Rectal, 2 Times Daily      morphine 20 mg/mL solution concentrated solution   5 mg, Oral, Every 3 Hours PRN         Continue These Medications      Instructions Start Date   acetaminophen 500 MG tablet  Commonly known as: TYLENOL   500 mg, Oral, Every 6 Hours PRN      albuterol sulfate  (90 Base) MCG/ACT inhaler  Commonly known as: PROVENTIL HFA;VENTOLIN HFA;PROAIR HFA   2 puffs, Inhalation, 4 Times Daily - RT      allopurinol 300 MG tablet  Commonly known as: Zyloprim   300 mg, Oral, Daily      Aspirin 81 81 MG chewable tablet  Generic drug: aspirin   Aspir-81      dexamethasone 6 MG tablet  Commonly known as: DECADRON   6 mg, Oral, Daily With Breakfast   Start Date: February 20, 2022     Eliquis DVT/PE Starter Pack tablet therapy pack  Generic drug: Apixaban Starter Pack   5 mg, Oral, Take As Directed      lidocaine-prilocaine 2.5-2.5 % cream  Commonly known as: EMLA   1 application, Topical, As Needed      megestrol 40 MG/ML suspension  Commonly known as: MEGACE   TAKE 20 ML BY MOUTH  ONCE DAILY      metoprolol succinate XL 25 MG 24 hr tablet  Commonly known as: TOPROL-XL   1 tablet, Oral, Daily      mirtazapine 15 MG tablet  Commonly known as: REMERON   15 mg, Oral, Nightly      nystatin 100,000 unit/mL suspension  Commonly known as: MYCOSTATIN   500,000 Units, Swish & Swallow, 4 Times Daily      ondansetron 8 MG tablet  Commonly known as: ZOFRAN   8 mg, Oral, 3 Times Daily PRN      OTEZLA PO   30 mg      potassium chloride 10 MEQ CR  tablet   No dose, route, or frequency recorded.      potassium chloride 20 MEQ CR tablet  Commonly known as: K-DUR,KLOR-CON   20 mEq, Oral, Daily      rosuvastatin 20 MG tablet  Commonly known as: CRESTOR   20 mg, Oral         Stop These Medications    DUTOPROL PO     nitrofurantoin (macrocrystal-monohydrate) 100 MG capsule  Commonly known as: MACROBID     ondansetron ODT 4 MG disintegrating tablet  Commonly known as: Zofran ODT     predniSONE 20 MG tablet  Commonly known as: DELTASONE            Allergies   Allergen Reactions   • Codeine Nausea And Vomiting   • Penicillins Swelling         Discharge Disposition:  Home or Self Care    Diet:  Hospital:  Diet Order   Procedures   • Diet Regular       Restrictions or Other Recommendations:  • (+) positive COVID-19 test result  • The majority of patients with a positive test result will recover, symptom severity is variable among those infected.  • Certain comorbidities such as COPD/asthma, DM, CAD and others can increase the risk of more severe illness.  • For this reason, Vale Cee strongly encouraged to practice the safest standards in protecting their health and others given the endemic concerns.  Patient was advised to:  o  Practice social distancing including limiting contact with family (even in the same home) and friends as much as possible for at least 20 days.  o Implement 20 day home self-quarantining, with the exception of seeking required or essential medical care.  o Continue to wash their hands frequently with soap and hot water, and cover their cough.   • For support of non-emergent symptoms expected with this virus such as increased shortness of breath, fever, cough, or other questions, Vale Cee was asked to please call their primary care physician’s office or the [place] hotline at [phone].  • Vale Cee was advised to seek care in the Emergency Department should extreme symptoms arise such as new onset confusion, extreme lethargy,  hypoxia, or new hypotension.  • Questions were engaged and answered to the best of my ability, patient expressed verbal understanding of their test results and my advice.         CODE STATUS:    Code Status and Medical Interventions:   Ordered at: 02/18/22 1323     Code Status (Patient has no pulse and is not breathing):    No CPR (Do Not Attempt to Resuscitate)     Medical Interventions (Patient has pulse or is breathing):    Comfort Measures       Future Appointments   Date Time Provider Department Center   2/28/2022 11:30 AM [facility] PET ADMIN RM1  S[facility] PETCT SHELL   2/28/2022 12:30 PM SHELL [place] PETCT 1  [facility] PETCT [facility]   3/1/2022  9:00 AM Adelaida Al MD [facility] ONC SHELL SHELL   3/1/2022  9:30 AM CHAIR 4  SHELL OPI SHELL       Additional Instructions for the Follow-ups that You Need to Schedule     Discharge Follow-up with PCP   As directed       Currently Documented PCP:    Sarah Redman APRN    PCP Phone Number:    [phone]     Follow Up Details: 1-2 week hospital follow up         Discharge Follow-up with Specialty: Dr. Al; 2 Weeks   As directed      Specialty: Dr. Al    Follow Up: 2 Weeks                     Preethi Mackenzie II, DO  02/19/22      Time Spent on Discharge:  I spent 34  minutes on this discharge activity which included: face-to-face encounter with the patient, reviewing the data in the system, coordination of the care with the nursing staff as well as consultants, documentation, and entering orders.          Electronically signed by Preethi Mackenzie II, DO at 02/19/22 0941

## 2022-02-23 ENCOUNTER — TELEPHONE (OUTPATIENT)
Dept: ONCOLOGY | Facility: CLINIC | Age: 77
End: 2022-02-23

## 2022-02-23 NOTE — TELEPHONE ENCOUNTER
Caller: Simran Cee    Relationship: Emergency Contact    Best call back number: [phone]    What was the call regarding: SIMRAN CALLED TO CANCEL ALL OF [facility]S UPCOMING APPTS. SHE IS CURRENTLY IN HOSPICE CARE, SO HE DOES NOT NEED TO R/S.

## 2022-02-25 ENCOUNTER — HOME CARE VISIT (OUTPATIENT)
Dept: HOME HEALTH SERVICES | Facility: HOME HEALTHCARE | Age: 77
End: 2022-02-25

## 2022-02-27 NOTE — CASE COMMUNICATION
Patient transfer change to administrative discharge as she discharge home with Hospice care on 2/19/2022

## 2022-07-04 DIAGNOSIS — R11.2 INTRACTABLE VOMITING WITH NAUSEA: ICD-10-CM

## 2022-07-04 DIAGNOSIS — C83.33 DIFFUSE LARGE B-CELL LYMPHOMA OF INTRA-ABDOMINAL LYMPH NODES: ICD-10-CM

## 2022-07-05 RX ORDER — MEGESTROL ACETATE 40 MG/ML
SUSPENSION ORAL
Qty: 240 ML | Refills: 0 | Status: SHIPPED | OUTPATIENT
Start: 2022-07-05